# Patient Record
Sex: FEMALE | Race: BLACK OR AFRICAN AMERICAN | Employment: OTHER | URBAN - METROPOLITAN AREA
[De-identification: names, ages, dates, MRNs, and addresses within clinical notes are randomized per-mention and may not be internally consistent; named-entity substitution may affect disease eponyms.]

---

## 2019-11-08 ENCOUNTER — OP HISTORICAL/CONVERTED ENCOUNTER (OUTPATIENT)
Dept: OTHER | Age: 69
End: 2019-11-08

## 2020-02-19 ENCOUNTER — OP HISTORICAL/CONVERTED ENCOUNTER (OUTPATIENT)
Dept: OTHER | Age: 70
End: 2020-02-19

## 2020-09-12 DIAGNOSIS — Z12.39 SCREENING BREAST EXAMINATION: ICD-10-CM

## 2021-04-19 ENCOUNTER — HOSPITAL ENCOUNTER (OUTPATIENT)
Dept: LAB | Age: 71
Discharge: HOME | End: 2021-04-19
Payer: MEDICARE

## 2021-04-19 ENCOUNTER — TRANSCRIBE ORDER (OUTPATIENT)
Dept: REGISTRATION | Age: 71
End: 2021-04-19

## 2021-04-19 DIAGNOSIS — N18.6 TYPE 2 DIABETES MELLITUS WITH ESRD (END-STAGE RENAL DISEASE) (HCC): Primary | ICD-10-CM

## 2021-04-19 DIAGNOSIS — E11.22 TYPE 2 DIABETES MELLITUS WITH ESRD (END-STAGE RENAL DISEASE) (HCC): Primary | ICD-10-CM

## 2021-04-19 DIAGNOSIS — N18.6 TYPE 2 DIABETES MELLITUS WITH ESRD (END-STAGE RENAL DISEASE) (HCC): ICD-10-CM

## 2021-04-19 DIAGNOSIS — E11.22 TYPE 2 DIABETES MELLITUS WITH ESRD (END-STAGE RENAL DISEASE) (HCC): ICD-10-CM

## 2021-04-19 LAB
ANION GAP SERPL CALC-SCNC: 10 MMOL/L (ref 5–15)
BUN SERPL-MCNC: 82 MG/DL (ref 6–20)
BUN/CREAT SERPL: 24 (ref 12–20)
CA-I BLD-MCNC: 9.6 MG/DL (ref 8.5–10.1)
CHLORIDE SERPL-SCNC: 106 MMOL/L (ref 97–108)
CO2 SERPL-SCNC: 25 MMOL/L (ref 21–32)
CREAT SERPL-MCNC: 3.47 MG/DL (ref 0.55–1.02)
EST. AVERAGE GLUCOSE BLD GHB EST-MCNC: 114 MG/DL
GLUCOSE SERPL-MCNC: 87 MG/DL (ref 65–100)
HBA1C MFR BLD: 5.6 % (ref 4–5.6)
POTASSIUM SERPL-SCNC: 4.6 MMOL/L (ref 3.5–5.1)
SODIUM SERPL-SCNC: 141 MMOL/L (ref 136–145)

## 2021-04-19 PROCEDURE — 36415 COLL VENOUS BLD VENIPUNCTURE: CPT

## 2021-04-19 PROCEDURE — 83036 HEMOGLOBIN GLYCOSYLATED A1C: CPT

## 2021-04-19 PROCEDURE — 80048 BASIC METABOLIC PNL TOTAL CA: CPT

## 2021-08-06 ENCOUNTER — HOSPITAL ENCOUNTER (EMERGENCY)
Age: 71
Discharge: HOME | End: 2021-08-06
Attending: EMERGENCY MEDICINE
Payer: MEDICARE

## 2021-08-06 VITALS
HEIGHT: 63 IN | SYSTOLIC BLOOD PRESSURE: 115 MMHG | OXYGEN SATURATION: 99 % | RESPIRATION RATE: 20 BRPM | WEIGHT: 190 LBS | TEMPERATURE: 97.2 F | BODY MASS INDEX: 33.66 KG/M2 | HEART RATE: 75 BPM | DIASTOLIC BLOOD PRESSURE: 67 MMHG

## 2021-08-06 DIAGNOSIS — N30.01 ACUTE CYSTITIS WITH HEMATURIA: Primary | ICD-10-CM

## 2021-08-06 LAB
APPEARANCE UR: CLEAR
BACTERIA URNS QL MICRO: ABNORMAL /HPF
BILIRUB UR QL: NEGATIVE
COLOR UR: ABNORMAL
GLUCOSE UR STRIP.AUTO-MCNC: NEGATIVE MG/DL
HGB UR QL STRIP: ABNORMAL
KETONES UR QL STRIP.AUTO: NEGATIVE MG/DL
LEUKOCYTE ESTERASE UR QL STRIP.AUTO: ABNORMAL
NITRITE UR QL STRIP.AUTO: NEGATIVE
PH UR STRIP: 5.5 [PH] (ref 5–8)
PROT UR STRIP-MCNC: 30 MG/DL
RBC #/AREA URNS HPF: ABNORMAL /HPF (ref 0–3)
SP GR UR REFRACTOMETRY: 1.02 (ref 1–1.03)
UROBILINOGEN UR QL STRIP.AUTO: 0.2 EU/DL (ref 0.2–1)
WBC URNS QL MICRO: ABNORMAL /HPF (ref 0–5)

## 2021-08-06 PROCEDURE — 81001 URINALYSIS AUTO W/SCOPE: CPT

## 2021-08-06 PROCEDURE — 99283 EMERGENCY DEPT VISIT LOW MDM: CPT

## 2021-08-06 PROCEDURE — 74011250637 HC RX REV CODE- 250/637: Performed by: EMERGENCY MEDICINE

## 2021-08-06 RX ORDER — FUROSEMIDE 40 MG/1
40 TABLET ORAL DAILY
COMMUNITY

## 2021-08-06 RX ORDER — SPIRONOLACTONE 25 MG/1
25 TABLET ORAL DAILY
COMMUNITY

## 2021-08-06 RX ORDER — LISINOPRIL 10 MG/1
10 TABLET ORAL DAILY
COMMUNITY

## 2021-08-06 RX ORDER — CEPHALEXIN 500 MG/1
500 CAPSULE ORAL 2 TIMES DAILY
Qty: 10 CAPSULE | Refills: 0 | Status: SHIPPED | OUTPATIENT
Start: 2021-08-06 | End: 2021-08-11

## 2021-08-06 RX ORDER — SODIUM BICARBONATE 650 MG/1
650 TABLET ORAL 3 TIMES DAILY
COMMUNITY

## 2021-08-06 RX ORDER — PANTOPRAZOLE SODIUM 40 MG/1
40 TABLET, DELAYED RELEASE ORAL DAILY
COMMUNITY

## 2021-08-06 RX ORDER — AMIODARONE HYDROCHLORIDE 100 MG/1
50 TABLET ORAL DAILY
COMMUNITY

## 2021-08-06 RX ORDER — SODIUM POLYSTYRENE SULFONATE 15 G/60ML
SUSPENSION ORAL; RECTAL DAILY
COMMUNITY

## 2021-08-06 RX ORDER — CEPHALEXIN 250 MG/1
500 CAPSULE ORAL
Status: COMPLETED | OUTPATIENT
Start: 2021-08-06 | End: 2021-08-06

## 2021-08-06 RX ORDER — ALLOPURINOL 300 MG/1
300 TABLET ORAL DAILY
COMMUNITY

## 2021-08-06 RX ORDER — METOPROLOL SUCCINATE 50 MG/1
50 TABLET, EXTENDED RELEASE ORAL DAILY
COMMUNITY

## 2021-08-06 RX ORDER — ACETAMINOPHEN 325 MG/1
650 TABLET ORAL
Status: COMPLETED | OUTPATIENT
Start: 2021-08-06 | End: 2021-08-06

## 2021-08-06 RX ADMIN — CEPHALEXIN 500 MG: 250 CAPSULE ORAL at 11:44

## 2021-08-06 RX ADMIN — ACETAMINOPHEN 650 MG: 325 TABLET ORAL at 11:45

## 2021-08-06 NOTE — ED PROVIDER NOTES
EMERGENCY DEPARTMENT HISTORY AND PHYSICAL EXAM      Date: 8/6/2021  Patient Name: Rupal Prescott    History of Presenting Illness     Chief Complaint   Patient presents with    Back Pain     pt presents with complaint of back pain states that her whole back hurts, pt states that she fell 6-8 months ago and pain has been ongoing since. Pt states that she just doesnt feel good. States that she has been having loose stools but has been eating lots of sweets. Pt VS stable, Pt in NAD in triage. Pt rates pain 7/10       History Provided By: Patient    HPI: Rupal Prescott, 65 y.o. female with a past medical history significant diabetes, hypertension and CHF presents to the ED with cc of worsening lower back pain but denies any recent trauma    There are no other complaints, changes, or physical findings at this time. PCP: Saira Stewart MD    No current facility-administered medications on file prior to encounter. Current Outpatient Medications on File Prior to Encounter   Medication Sig Dispense Refill    sodium polystyrene (KAYEXALATE) 15 gram/60 mL suspension Take  by mouth daily.  pantoprazole (PROTONIX) 40 mg tablet Take 40 mg by mouth daily.  allopurinoL (ZYLOPRIM) 300 mg tablet Take 300 mg by mouth daily.  spironolactone (ALDACTONE) 25 mg tablet Take 25 mg by mouth daily.  metoprolol succinate (TOPROL-XL) 50 mg XL tablet Take 50 mg by mouth daily.  lisinopriL (PRINIVIL, ZESTRIL) 10 mg tablet Take 10 mg by mouth daily.  amiodarone (PACERONE) 100 mg tablet Take 50 mg by mouth daily.  furosemide (LASIX) 40 mg tablet Take 40 mg by mouth daily.  sodium bicarbonate 650 mg tablet Take 650 mg by mouth three (3) times daily.          Past History     Past Medical History:  Past Medical History:   Diagnosis Date    [facility]est[facility] [address]     Diabetes ([facility] [address]     Hypertension        Past Surgical History:  Past Surgical History:   Procedure Laterality Date    HX IMPLANTABLE CARDIOVERTER DEFIBRILLATOR         Family History:  History reviewed. No pertinent family history. Social History:  Social History     Tobacco Use    Smoking status: Never Smoker    Smokeless tobacco: Never Used   Substance Use Topics    Alcohol use: Not on file    Drug use: Not on file       Allergies: Allergies   Allergen Reactions    Contrast Agent [Iodine] Nausea and Vomiting         Review of Systems     Review of Systems   Constitutional: Negative for chills and fever. HENT: Negative for rhinorrhea and sore throat. Eyes: Negative for pain and visual disturbance. Respiratory: Negative for cough and shortness of breath. Cardiovascular: Negative for chest pain and leg swelling. Gastrointestinal: Negative for abdominal pain and vomiting. Endocrine: Negative for polydipsia and polyuria. Genitourinary: Negative for dysuria and urgency. Musculoskeletal: Positive for back pain. Negative for neck pain. Skin: Negative for color change and pallor. Neurological: Negative for weakness and numbness. Psychiatric/Behavioral: Negative. Physical Exam     Physical Exam  Vitals and nursing note reviewed. Constitutional:       Appearance: Normal appearance. HENT:      Head: Normocephalic and atraumatic. Mouth/Throat:      Mouth: Mucous membranes are moist.      Pharynx: Oropharynx is clear. Eyes:      Extraocular Movements: Extraocular movements intact. Conjunctiva/sclera: Conjunctivae normal.      Pupils: Pupils are equal, round, and reactive to light. Cardiovascular:      Rate and Rhythm: Normal rate and regular rhythm. Pulses: Normal pulses. Heart sounds: Normal heart sounds. Pulmonary:      Effort: Pulmonary effort is normal.      Breath sounds: Normal breath sounds. Abdominal:      General: Bowel sounds are normal.      Palpations: Abdomen is soft. Tenderness: There is no right CVA tenderness or left CVA tenderness.    Musculoskeletal: General: Tenderness present. Normal range of motion. Cervical back: Normal, normal range of motion and neck supple. Thoracic back: Normal.      Lumbar back: Tenderness present. No deformity, spasms or bony tenderness. Comments: Bilateral paralumbar soft tissue tenderness   Skin:     General: Skin is warm and dry. Capillary Refill: Capillary refill takes less than 2 seconds. Neurological:      General: No focal deficit present. Mental Status: She is alert and oriented to person, place, and time. Psychiatric:         Mood and Affect: Mood normal.         Behavior: Behavior normal.         Lab and Diagnostic Study Results     Labs -   No results found for this or any previous visit (from the past 12 hour(s)). Radiologic Studies -   @lastxrresult@  CT Results  (Last 48 hours)    None        CXR Results  (Last 48 hours)    None            Medical Decision Making   - I am the first provider for this patient. - I reviewed the vital signs, available nursing notes, past medical history, past surgical history, family history and social history. - Initial assessment performed. The patients presenting problems have been discussed, and they are in agreement with the care plan formulated and outlined with them. I have encouraged them to ask questions as they arise throughout their visit. Vital Signs-Reviewed the patient's vital signs. Patient Vitals for the past 12 hrs:   Temp Pulse Resp BP SpO2   08/06/21 0955 -- -- -- -- 99 %   08/06/21 0937 97.2 °F (36.2 °C) 75 20 115/67 99 %       Records Reviewed: Nursing Notes    The patient presents with back pain with a differential diagnosis of  kidney stone, lumbar strain and UTI      ED Course:          Provider Notes (Medical Decision Making): MDM       Procedures   Medical Decision Makingedical Decision Making  Performed by:  David Thomas MD  PROCEDURES:  Procedures       Disposition   Disposition: Condition stable and improved  DC- Adult Discharges: All of the diagnostic tests were reviewed and questions answered. Diagnosis, care plan and treatment options were discussed. The patient understands the instructions and will follow up as directed. The patients results have been reviewed with them. They have been counseled regarding their diagnosis. The patient verbally convey understanding and agreement of the signs, symptoms, diagnosis, treatment and prognosis and additionally agrees to follow up as recommended with their PCP in 24 - 48 hours. They also agree with the care-plan and convey that all of their questions have been answered. I have also put together some discharge instructions for them that include: 1) educational information regarding their diagnosis, 2) how to care for their diagnosis at home, as well a 3) list of reasons why they would want to return to the ED prior to their follow-up appointment, should their condition change. DISCHARGE PLAN:  1. Current Discharge Medication List      CONTINUE these medications which have NOT CHANGED    Details   sodium polystyrene (KAYEXALATE) 15 gram/60 mL suspension Take  by mouth daily. pantoprazole (PROTONIX) 40 mg tablet Take 40 mg by mouth daily. allopurinoL (ZYLOPRIM) 300 mg tablet Take 300 mg by mouth daily. spironolactone (ALDACTONE) 25 mg tablet Take 25 mg by mouth daily. metoprolol succinate (TOPROL-XL) 50 mg XL tablet Take 50 mg by mouth daily. lisinopriL (PRINIVIL, ZESTRIL) 10 mg tablet Take 10 mg by mouth daily. amiodarone (PACERONE) 100 mg tablet Take 50 mg by mouth daily. furosemide (LASIX) 40 mg tablet Take 40 mg by mouth daily. sodium bicarbonate 650 mg tablet Take 650 mg by mouth three (3) times daily. 2.   Follow-up Information    None       3. Return to ED if worse   4. Current Discharge Medication List            Diagnosis     Clinical Impression: No diagnosis found.     Attestations:    Julia MD Satya    Please note that this dictation was completed with [employer], the computer voice recognition software. Quite often unanticipated grammatical, syntax, homophones, and other interpretive errors are inadvertently transcribed by the computer software. Please disregard these errors. Please excuse any errors that have escaped final proofreading. Thank you.

## 2021-08-06 NOTE — ED NOTES
Pt up and ambulatory to restroom to provide urine sample, pt in NAD using cane to ambulate.  Pt states that she recently saw kidney MD on the 3rd of this month of august. Pt states that even with use of diuretics that she has not been voiding like she normally does which was the purpose of her recent MD visit to her kidney doctor

## 2021-08-06 NOTE — ED TRIAGE NOTES
.  Chief Complaint   Patient presents with    Back Pain     pt presents with complaint of back pain states that her whole back hurts, pt states that she fell 6-8 months ago and pain has been ongoing since. Pt states that she just doesnt feel good. States that she has been having loose stools but has been eating lots of sweets. Pt VS stable, Pt in NAD in triage.  Pt rates pain 7/10

## 2021-08-17 ENCOUNTER — TRANSCRIBE ORDER (OUTPATIENT)
Dept: SCHEDULING | Age: 71
End: 2021-08-17

## 2021-08-17 DIAGNOSIS — Z12.31 SCREENING MAMMOGRAM FOR HIGH-RISK PATIENT: Primary | ICD-10-CM

## 2021-09-16 ENCOUNTER — HOSPITAL ENCOUNTER (OUTPATIENT)
Dept: MAMMOGRAPHY | Age: 71
Discharge: HOME | End: 2021-09-16
Attending: FAMILY MEDICINE
Payer: MEDICARE

## 2021-09-16 DIAGNOSIS — Z12.31 SCREENING MAMMOGRAM FOR HIGH-RISK PATIENT: ICD-10-CM

## 2021-09-16 PROCEDURE — 77067 SCR MAMMO BI INCL CAD: CPT

## 2022-10-20 ENCOUNTER — TRANSCRIBE ORDER (OUTPATIENT)
Dept: SCHEDULING | Age: 72
End: 2022-10-20

## 2022-10-20 DIAGNOSIS — Z12.31 VISIT FOR SCREENING MAMMOGRAM: Primary | ICD-10-CM

## 2022-11-18 ENCOUNTER — HOSPITAL ENCOUNTER (EMERGENCY)
Age: 72
Discharge: HOME | End: 2022-11-18
Attending: EMERGENCY MEDICINE
Payer: MEDICARE

## 2022-11-18 VITALS
DIASTOLIC BLOOD PRESSURE: 126 MMHG | HEIGHT: 63 IN | OXYGEN SATURATION: 98 % | RESPIRATION RATE: 18 BRPM | TEMPERATURE: 98.6 F | BODY MASS INDEX: 31.89 KG/M2 | WEIGHT: 180 LBS | SYSTOLIC BLOOD PRESSURE: 167 MMHG | HEART RATE: 90 BPM

## 2022-11-18 DIAGNOSIS — S90.822A BLISTER (NONTHERMAL), LEFT FOOT, INITIAL ENCOUNTER: Primary | ICD-10-CM

## 2022-11-18 PROCEDURE — 99283 EMERGENCY DEPT VISIT LOW MDM: CPT

## 2022-11-18 RX ORDER — CEPHALEXIN 500 MG/1
500 CAPSULE ORAL 3 TIMES DAILY
Qty: 21 CAPSULE | Refills: 0 | Status: SHIPPED | OUTPATIENT
Start: 2022-11-18 | End: 2022-11-25

## 2022-11-18 NOTE — ED NOTES
Prior to dc made MD aware bp was elevated but pt had not taken her BP med. MD states to dc pt home and have her take BP med when she gets home.  Pt and Daughter made aware she needed to take her BP meds when she gets home

## 2022-11-18 NOTE — ED PROVIDER NOTES
EMERGENCY DEPARTMENT HISTORY AND PHYSICAL EXAM      Date: 11/18/2022  Patient Name: Cami Mclaughlin    History of Presenting Illness     Chief Complaint   Patient presents with    Foot Pain       History Provided By: Patient    HPI: Cami Mclaughlin, 66 y.o. female history of diabetes presenting with seeping wound from the right foot. Daughter noticed some fluid from the right foot yesterday. She also had a scab on the lower leg that has fallen off. No fevers, pain, redness. Patient has been wearing slippers for several months due to leg swelling. She does have a podiatrist    There are no other complaints, changes, or physical findings at this time. PCP: Khris Arroyo MD    No current facility-administered medications on file prior to encounter. Current Outpatient Medications on File Prior to Encounter   Medication Sig Dispense Refill    sodium polystyrene (KAYEXALATE) 15 gram/60 mL suspension Take  by mouth daily. pantoprazole (PROTONIX) 40 mg tablet Take 40 mg by mouth daily. allopurinoL (ZYLOPRIM) 300 mg tablet Take 300 mg by mouth daily. spironolactone (ALDACTONE) 25 mg tablet Take 25 mg by mouth daily. metoprolol succinate (TOPROL-XL) 50 mg XL tablet Take 50 mg by mouth daily. lisinopriL (PRINIVIL, ZESTRIL) 10 mg tablet Take 10 mg by mouth daily. amiodarone (PACERONE) 100 mg tablet Take 50 mg by mouth daily. furosemide (LASIX) 40 mg tablet Take 40 mg by mouth daily. sodium bicarbonate 650 mg tablet Take 650 mg by mouth three (3) times daily. Past History     Past Medical History:  Past Medical History:   Diagnosis Date    CHF (congestive heart failure) ([facility] [address]     Diabetes ([facility] [address]     Hypertension     Menopause        Past Surgical History:  Past Surgical History:   Procedure Laterality Date    HX IMPLANTABLE CARDIOVERTER DEFIBRILLATOR         Family History:  History reviewed. No pertinent family history.     Social History:  Social History     Tobacco Use Smoking status: Never    Smokeless tobacco: Never       Allergies: Allergies   Allergen Reactions    Contrast Agent [Iodine] Nausea and Vomiting       Review of Systems   Review of Systems   Constitutional:  Negative for activity change, chills and fever. Respiratory: Negative. Cardiovascular: Negative. Gastrointestinal: Negative. Skin:  Positive for wound. Neurological:  Negative for weakness and numbness. Physical Exam   Physical Exam  Vitals and nursing note reviewed. Constitutional:       General: She is not in acute distress. Appearance: Normal appearance. HENT:      Head: Normocephalic and atraumatic. Mouth/Throat:      Mouth: Mucous membranes are moist.   Eyes:      Conjunctiva/sclera: Conjunctivae normal.   Pulmonary:      Effort: Pulmonary effort is normal. No respiratory distress. Musculoskeletal:      Cervical back: Normal range of motion. Comments: Large blister to the right lower calf. No active drainage. No surrounding erythema or warmth. Skin:     General: Skin is warm and dry. Neurological:      General: No focal deficit present. Mental Status: She is alert and oriented to person, place, and time. Mental status is at baseline. Lab and Diagnostic Study Results   Labs -   No results found for this or any previous visit (from the past 12 hour(s)). Radiologic Studies -   @lastxrresult@  CT Results  (Last 48 hours)      None          CXR Results  (Last 48 hours)      None            Medical Decision Making and ED Course   Differential Diagnosis & Medical Decision Making Provider Note:   Patient with history of diabetes presenting with wound to her right lower leg. She has a large blister to her right lower leg that appears ruptured but otherwise still intact. No surrounding erythema, warmth.   Given high risk with her history of diabetes we will put on Keflex and have her follow-up with her podiatrist.    - I am the first provider for this patient. I reviewed the vital signs, available nursing notes, past medical history, past surgical history, family history and social history. The patients presenting problems have been discussed, and they are in agreement with the care plan formulated and outlined with them. I have encouraged them to ask questions as they arise throughout their visit. Vital Signs-Reviewed the patient's vital signs. Patient Vitals for the past 12 hrs:   Temp Pulse Resp BP SpO2   11/18/22 1124 98.6 °F (37 °C) (!) 113 19 (!) 171/120 98 %       ED Course:             Disposition   Disposition: DC- Adult Discharges: All of the diagnostic tests were reviewed and questions answered. Diagnosis, care plan and treatment options were discussed. The patient understands the instructions and will follow up as directed. The patients results have been reviewed with them. They have been counseled regarding their diagnosis. The patient verbally convey understanding and agreement of the signs, symptoms, diagnosis, treatment and prognosis and additionally agrees to follow up as recommended with their PCP in 24 - 48 hours. They also agree with the care-plan and convey that all of their questions have been answered. I have also put together some discharge instructions for them that include: 1) educational information regarding their diagnosis, 2) how to care for their diagnosis at home, as well a 3) list of reasons why they would want to return to the ED prior to their follow-up appointment, should their condition change. DISCHARGE PLAN:  1. Current Discharge Medication List        START taking these medications    Details   cephALEXin (Keflex) 500 mg capsule Take 1 Capsule by mouth three (3) times daily for 7 days. Qty: 21 Capsule, Refills: 0           CONTINUE these medications which have NOT CHANGED    Details   sodium polystyrene (KAYEXALATE) 15 gram/60 mL suspension Take  by mouth daily.       pantoprazole (PROTONIX) 40 mg tablet Take 40 mg by mouth daily. allopurinoL (ZYLOPRIM) 300 mg tablet Take 300 mg by mouth daily. spironolactone (ALDACTONE) 25 mg tablet Take 25 mg by mouth daily. metoprolol succinate (TOPROL-XL) 50 mg XL tablet Take 50 mg by mouth daily. lisinopriL (PRINIVIL, ZESTRIL) 10 mg tablet Take 10 mg by mouth daily. amiodarone (PACERONE) 100 mg tablet Take 50 mg by mouth daily. furosemide (LASIX) 40 mg tablet Take 40 mg by mouth daily. sodium bicarbonate 650 mg tablet Take 650 mg by mouth three (3) times daily. 2.   Follow-up Information       Follow up With Specialties Details Why Contact Molly Huffman DPM Podiatry In 2 days for reevaluation [address] [id number] [phone]            3. Return to ED if worse   4. Current Discharge Medication List        START taking these medications    Details   cephALEXin (Keflex) 500 mg capsule Take 1 Capsule by mouth three (3) times daily for 7 days. Qty: 21 Capsule, Refills: 0  Start date: 11/18/2022, End date: 11/25/2022                Diagnosis/Clinical Impression     Clinical Impression:   1. Blister (nonthermal), left foot, initial encounter        Attestations: Gillian COOK MD, am the primary clinician of record. Please note that this dictation was completed with conXt, the computer voice recognition software. Quite often unanticipated grammatical, syntax, homophones, and other interpretive errors are inadvertently transcribed by the computer software. Please disregard these errors. Please excuse any errors that have escaped final proofreading. Thank you.

## 2022-11-18 NOTE — ED TRIAGE NOTES
Pt presents c/o open sores on right foot and scratches on both legs. Pt. Stated her foot doctor requested her come to the ED.

## 2022-12-17 ENCOUNTER — APPOINTMENT (OUTPATIENT)
Dept: GENERAL RADIOLOGY | Age: 72
End: 2022-12-17
Attending: EMERGENCY MEDICINE
Payer: MEDICARE

## 2022-12-17 ENCOUNTER — HOSPITAL ENCOUNTER (EMERGENCY)
Age: 72
Discharge: ANOTHER HOSPITAL | End: 2022-12-17
Attending: EMERGENCY MEDICINE
Payer: MEDICARE

## 2022-12-17 ENCOUNTER — HOSPITAL ENCOUNTER (INPATIENT)
Age: 72
LOS: 5 days | Discharge: NURSING, REHAB OR LONG-TERM CARE | End: 2022-12-23
Attending: STUDENT IN AN ORGANIZED HEALTH CARE EDUCATION/TRAINING PROGRAM | Admitting: INTERNAL MEDICINE
Payer: MEDICARE

## 2022-12-17 VITALS
TEMPERATURE: 99 F | HEIGHT: 63 IN | OXYGEN SATURATION: 97 % | WEIGHT: 231.8 LBS | DIASTOLIC BLOOD PRESSURE: 85 MMHG | RESPIRATION RATE: 22 BRPM | SYSTOLIC BLOOD PRESSURE: 139 MMHG | HEART RATE: 90 BPM | BODY MASS INDEX: 41.07 KG/M2

## 2022-12-17 DIAGNOSIS — R09.02 HYPOXIA: ICD-10-CM

## 2022-12-17 DIAGNOSIS — J81.0 ACUTE PULMONARY EDEMA (HCC): ICD-10-CM

## 2022-12-17 DIAGNOSIS — I50.9 ACUTE ON CHRONIC CONGESTIVE HEART FAILURE, UNSPECIFIED HEART FAILURE TYPE (HCC): Primary | ICD-10-CM

## 2022-12-17 DIAGNOSIS — I50.43 ACUTE ON CHRONIC COMBINED SYSTOLIC AND DIASTOLIC CONGESTIVE HEART FAILURE (HCC): ICD-10-CM

## 2022-12-17 DIAGNOSIS — I21.4 NSTEMI (NON-ST ELEVATED MYOCARDIAL INFARCTION) (HCC): Primary | ICD-10-CM

## 2022-12-17 DIAGNOSIS — N63.20 MASS OF LEFT BREAST, UNSPECIFIED QUADRANT: ICD-10-CM

## 2022-12-17 DIAGNOSIS — I21.4 NSTEMI (NON-ST ELEVATED MYOCARDIAL INFARCTION) (HCC): ICD-10-CM

## 2022-12-17 LAB
ALBUMIN SERPL-MCNC: 3.2 G/DL (ref 3.5–5)
ALBUMIN/GLOB SERPL: 0.8 {RATIO} (ref 1.1–2.2)
ALP SERPL-CCNC: 136 U/L (ref 45–117)
ALT SERPL-CCNC: 22 U/L (ref 12–78)
ANION GAP SERPL CALC-SCNC: 9 MMOL/L (ref 5–15)
AST SERPL W P-5'-P-CCNC: 26 U/L (ref 15–37)
ATRIAL RATE: 108 BPM
BILIRUB SERPL-MCNC: 1 MG/DL (ref 0.2–1)
BNP SERPL-MCNC: ABNORMAL PG/ML
BUN SERPL-MCNC: 31 MG/DL (ref 6–20)
BUN/CREAT SERPL: 14 (ref 12–20)
CA-I BLD-MCNC: 8.3 MG/DL (ref 8.5–10.1)
CALCULATED P AXIS, ECG09: -63 DEGREES
CALCULATED R AXIS, ECG10: -123 DEGREES
CALCULATED T AXIS, ECG11: 67 DEGREES
CHLORIDE SERPL-SCNC: 106 MMOL/L (ref 97–108)
CO2 SERPL-SCNC: 29 MMOL/L (ref 21–32)
CREAT SERPL-MCNC: 2.22 MG/DL (ref 0.55–1.02)
D DIMER PPP FEU-MCNC: 3.26 UG/ML(FEU)
DIAGNOSIS, 93000: NORMAL
FLUAV RNA SPEC QL NAA+PROBE: NOT DETECTED
FLUBV RNA SPEC QL NAA+PROBE: NOT DETECTED
GLOBULIN SER CALC-MCNC: 4.1 G/DL (ref 2–4)
GLUCOSE SERPL-MCNC: 154 MG/DL (ref 65–100)
INR PPP: 1.2 (ref 0.9–1.1)
LACTATE SERPL-SCNC: 2 MMOL/L (ref 0.4–2)
P-R INTERVAL, ECG05: 100 MS
POTASSIUM SERPL-SCNC: 4.1 MMOL/L (ref 3.5–5.1)
PROT SERPL-MCNC: 7.3 G/DL (ref 6.4–8.2)
PROTHROMBIN TIME: 14.5 SEC (ref 11.9–14.6)
Q-T INTERVAL, ECG07: 425 MS
QRS DURATION, ECG06: 163 MS
QTC CALCULATION (BEZET), ECG08: 573 MS
SARS-COV-2, COV2: NOT DETECTED
SODIUM SERPL-SCNC: 144 MMOL/L (ref 136–145)
TROPONIN-HIGH SENSITIVITY: 131 NG/L (ref 0–51)
VENTRICULAR RATE, ECG03: 109 BPM

## 2022-12-17 PROCEDURE — 99285 EMERGENCY DEPT VISIT HI MDM: CPT

## 2022-12-17 PROCEDURE — 71045 X-RAY EXAM CHEST 1 VIEW: CPT

## 2022-12-17 PROCEDURE — 74011250636 HC RX REV CODE- 250/636: Performed by: EMERGENCY MEDICINE

## 2022-12-17 PROCEDURE — 83735 ASSAY OF MAGNESIUM: CPT

## 2022-12-17 PROCEDURE — 87636 SARSCOV2 & INF A&B AMP PRB: CPT

## 2022-12-17 PROCEDURE — 74011250637 HC RX REV CODE- 250/637: Performed by: EMERGENCY MEDICINE

## 2022-12-17 PROCEDURE — 96374 THER/PROPH/DIAG INJ IV PUSH: CPT

## 2022-12-17 PROCEDURE — 85610 PROTHROMBIN TIME: CPT

## 2022-12-17 PROCEDURE — 80053 COMPREHEN METABOLIC PANEL: CPT

## 2022-12-17 PROCEDURE — 83880 ASSAY OF NATRIURETIC PEPTIDE: CPT

## 2022-12-17 PROCEDURE — 93005 ELECTROCARDIOGRAM TRACING: CPT

## 2022-12-17 PROCEDURE — 84484 ASSAY OF TROPONIN QUANT: CPT

## 2022-12-17 PROCEDURE — 83605 ASSAY OF LACTIC ACID: CPT

## 2022-12-17 PROCEDURE — 36415 COLL VENOUS BLD VENIPUNCTURE: CPT

## 2022-12-17 PROCEDURE — 87040 BLOOD CULTURE FOR BACTERIA: CPT

## 2022-12-17 PROCEDURE — 85025 COMPLETE CBC W/AUTO DIFF WBC: CPT

## 2022-12-17 PROCEDURE — 85379 FIBRIN DEGRADATION QUANT: CPT

## 2022-12-17 PROCEDURE — 85730 THROMBOPLASTIN TIME PARTIAL: CPT

## 2022-12-17 RX ORDER — INSULIN LISPRO 100 [IU]/ML
INJECTION, SOLUTION INTRAVENOUS; SUBCUTANEOUS
COMMUNITY

## 2022-12-17 RX ORDER — FUROSEMIDE 10 MG/ML
40 INJECTION INTRAMUSCULAR; INTRAVENOUS
Status: COMPLETED | OUTPATIENT
Start: 2022-12-17 | End: 2022-12-17

## 2022-12-17 RX ORDER — POTASSIUM CHLORIDE 20 MEQ/1
TABLET, EXTENDED RELEASE ORAL
COMMUNITY
End: 2022-12-23

## 2022-12-17 RX ORDER — PEN NEEDLE, DIABETIC 31 GX3/16"
NEEDLE, DISPOSABLE MISCELLANEOUS
COMMUNITY

## 2022-12-17 RX ORDER — INSULIN DEGLUDEC 200 U/ML
INJECTION, SOLUTION SUBCUTANEOUS
COMMUNITY

## 2022-12-17 RX ORDER — SACUBITRIL AND VALSARTAN 24; 26 MG/1; MG/1
TABLET, FILM COATED ORAL
COMMUNITY
End: 2022-12-23

## 2022-12-17 RX ORDER — SIMVASTATIN 20 MG/1
TABLET, FILM COATED ORAL
COMMUNITY

## 2022-12-17 RX ORDER — INSULIN GLARGINE 100 [IU]/ML
INJECTION, SOLUTION SUBCUTANEOUS
COMMUNITY
End: 2022-12-18

## 2022-12-17 RX ORDER — INSULIN DEGLUDEC 100 U/ML
50 INJECTION, SOLUTION SUBCUTANEOUS AT BEDTIME
COMMUNITY
End: 2022-12-18

## 2022-12-17 RX ORDER — GLIPIZIDE 10 MG/1
TABLET ORAL
COMMUNITY
End: 2022-12-18

## 2022-12-17 RX ORDER — DULAGLUTIDE 1.5 MG/.5ML
INJECTION, SOLUTION SUBCUTANEOUS
COMMUNITY

## 2022-12-17 RX ORDER — HYDRALAZINE HYDROCHLORIDE 10 MG/1
1 TABLET, FILM COATED ORAL 2 TIMES DAILY
COMMUNITY
Start: 2022-08-22 | End: 2023-08-22

## 2022-12-17 RX ORDER — CAPTOPRIL 25 MG/1
TABLET ORAL
COMMUNITY

## 2022-12-17 RX ORDER — GUAIFENESIN 100 MG/5ML
162 LIQUID (ML) ORAL
Status: COMPLETED | OUTPATIENT
Start: 2022-12-17 | End: 2022-12-17

## 2022-12-17 RX ORDER — TRAZODONE HYDROCHLORIDE 50 MG/1
50 TABLET ORAL AT BEDTIME
COMMUNITY
End: 2022-12-23

## 2022-12-17 RX ORDER — OMEPRAZOLE 40 MG/1
CAPSULE, DELAYED RELEASE ORAL
COMMUNITY
End: 2022-12-18

## 2022-12-17 RX ORDER — BUMETANIDE 0.25 MG/ML
2 INJECTION INTRAMUSCULAR; INTRAVENOUS
Status: COMPLETED | OUTPATIENT
Start: 2022-12-17 | End: 2022-12-18

## 2022-12-17 RX ORDER — LEVOTHYROXINE SODIUM 50 UG/1
TABLET ORAL
COMMUNITY

## 2022-12-17 RX ORDER — ASPIRIN 81 MG/1
TABLET ORAL
COMMUNITY
End: 2022-12-23

## 2022-12-17 RX ADMIN — ASPIRIN 81 MG 162 MG: 81 TABLET ORAL at 21:33

## 2022-12-17 RX ADMIN — FUROSEMIDE 40 MG: 10 INJECTION, SOLUTION INTRAMUSCULAR; INTRAVENOUS at 21:32

## 2022-12-18 ENCOUNTER — APPOINTMENT (OUTPATIENT)
Dept: NON INVASIVE DIAGNOSTICS | Age: 72
End: 2022-12-18
Attending: INTERNAL MEDICINE
Payer: MEDICARE

## 2022-12-18 PROBLEM — I50.9 CHF EXACERBATION (HCC): Status: ACTIVE | Noted: 2022-12-18

## 2022-12-18 LAB
ALBUMIN SERPL-MCNC: 3.2 G/DL (ref 3.5–5)
ALBUMIN/GLOB SERPL: 0.9 {RATIO} (ref 1.1–2.2)
ALP SERPL-CCNC: 133 U/L (ref 45–117)
ALT SERPL-CCNC: 18 U/L (ref 12–78)
ANION GAP SERPL CALC-SCNC: 9 MMOL/L (ref 5–15)
APTT PPP: 29.8 SEC (ref 21.2–34.1)
AST SERPL W P-5'-P-CCNC: 23 U/L (ref 15–37)
ATRIAL RATE: 97 BPM
BACTERIA SPEC CULT: NORMAL
BACTERIA SPEC CULT: NORMAL
BASOPHILS # BLD: 0.1 K/UL (ref 0–0.1)
BASOPHILS NFR BLD: 1 % (ref 0–1)
BILIRUB SERPL-MCNC: 1 MG/DL (ref 0.2–1)
BNP SERPL-MCNC: ABNORMAL PG/ML
BUN SERPL-MCNC: 27 MG/DL (ref 6–20)
BUN/CREAT SERPL: 14 (ref 12–20)
CA-I BLD-MCNC: 8.4 MG/DL (ref 8.5–10.1)
CALCULATED R AXIS, ECG10: -173 DEGREES
CALCULATED T AXIS, ECG11: 5 DEGREES
CHLORIDE SERPL-SCNC: 109 MMOL/L (ref 97–108)
CO2 SERPL-SCNC: 26 MMOL/L (ref 21–32)
CREAT SERPL-MCNC: 1.99 MG/DL (ref 0.55–1.02)
D DIMER PPP FEU-MCNC: 2.84 UG/ML(FEU)
DIAGNOSIS, 93000: NORMAL
DIFFERENTIAL METHOD BLD: ABNORMAL
ECHO AO ROOT DIAM: 3 CM
ECHO AO ROOT INDEX: 1.46 CM/M2
ECHO EST RA PRESSURE: 15 MMHG
ECHO IVC PROX: 2.8 CM
ECHO LA DIAMETER INDEX: 1.89 CM/M2
ECHO LA DIAMETER: 3.9 CM
ECHO LA TO AORTIC ROOT RATIO: 1.3
ECHO LV EDV A4C: 107 ML
ECHO LV EDV INDEX A4C: 52 ML/M2
ECHO LV EJECTION FRACTION BIPLANE: 25 % (ref 55–100)
ECHO LV FRACTIONAL SHORTENING: 13 % (ref 28–44)
ECHO LV INTERNAL DIMENSION DIASTOLE INDEX: 2.67 CM/M2
ECHO LV INTERNAL DIMENSION DIASTOLIC: 5.5 CM (ref 3.9–5.3)
ECHO LV INTERNAL DIMENSION SYSTOLIC INDEX: 2.33 CM/M2
ECHO LV INTERNAL DIMENSION SYSTOLIC: 4.8 CM
ECHO LV IVSD: 1.3 CM (ref 0.6–0.9)
ECHO LV MASS 2D: 288.2 G (ref 67–162)
ECHO LV MASS INDEX 2D: 139.9 G/M2 (ref 43–95)
ECHO LV POSTERIOR WALL DIASTOLIC: 1.2 CM (ref 0.6–0.9)
ECHO LV RELATIVE WALL THICKNESS RATIO: 0.44
ECHO RIGHT VENTRICULAR SYSTOLIC PRESSURE (RVSP): 59 MMHG
ECHO TV REGURGITANT MAX VELOCITY: 3.32 M/S
ECHO TV REGURGITANT PEAK GRADIENT: 44 MMHG
EOSINOPHIL # BLD: 0 K/UL (ref 0–0.4)
EOSINOPHIL NFR BLD: 0 % (ref 0–7)
ERYTHROCYTE [DISTWIDTH] IN BLOOD BY AUTOMATED COUNT: 16.4 % (ref 11.5–14.5)
GLOBULIN SER CALC-MCNC: 3.4 G/DL (ref 2–4)
GLUCOSE BLD STRIP.AUTO-MCNC: 107 MG/DL (ref 65–100)
GLUCOSE BLD STRIP.AUTO-MCNC: 109 MG/DL (ref 65–100)
GLUCOSE BLD STRIP.AUTO-MCNC: 114 MG/DL (ref 65–100)
GLUCOSE SERPL-MCNC: 127 MG/DL (ref 65–100)
HCT VFR BLD AUTO: 36.6 % (ref 35–47)
HGB BLD-MCNC: 11.5 G/DL (ref 11.5–16)
IMM GRANULOCYTES # BLD AUTO: 0 K/UL (ref 0–0.04)
IMM GRANULOCYTES NFR BLD AUTO: 0 % (ref 0–0.5)
INR PPP: 1.2 (ref 0.9–1.1)
LYMPHOCYTES # BLD: 1.4 K/UL (ref 0.8–3.5)
LYMPHOCYTES NFR BLD: 25 % (ref 12–49)
MAGNESIUM SERPL-MCNC: 2.1 MG/DL (ref 1.6–2.4)
MCH RBC QN AUTO: 28 PG (ref 26–34)
MCHC RBC AUTO-ENTMCNC: 31.4 G/DL (ref 30–36.5)
MCV RBC AUTO: 89.1 FL (ref 80–99)
MONOCYTES # BLD: 0.8 K/UL (ref 0–1)
MONOCYTES NFR BLD: 15 % (ref 5–13)
NEUTS SEG # BLD: 3.3 K/UL (ref 1.8–8)
NEUTS SEG NFR BLD: 59 % (ref 32–75)
NRBC # BLD: 0.04 K/UL (ref 0–0.01)
NRBC BLD-RTO: 0.7 PER 100 WBC
PERFORMED BY, TECHID: ABNORMAL
PLATELET # BLD AUTO: 235 K/UL (ref 150–400)
PMV BLD AUTO: 9.3 FL (ref 8.9–12.9)
POTASSIUM SERPL-SCNC: 3.4 MMOL/L (ref 3.5–5.1)
PROT SERPL-MCNC: 6.6 G/DL (ref 6.4–8.2)
PROTHROMBIN TIME: 15.5 SEC (ref 11.9–14.6)
Q-T INTERVAL, ECG07: 480 MS
QRS DURATION, ECG06: 162 MS
QTC CALCULATION (BEZET), ECG08: 609 MS
RBC # BLD AUTO: 4.11 M/UL (ref 3.8–5.2)
RBC MORPH BLD: ABNORMAL
SODIUM SERPL-SCNC: 144 MMOL/L (ref 136–145)
SPECIAL REQUESTS,SREQ: NORMAL
SPECIAL REQUESTS,SREQ: NORMAL
THERAPEUTIC RANGE,PTTT: NORMAL SEC (ref 82–109)
TROPONIN-HIGH SENSITIVITY: 127 NG/L (ref 0–51)
TROPONIN-HIGH SENSITIVITY: 140 NG/L (ref 0–51)
TSH SERPL DL<=0.05 MIU/L-ACNC: 14.2 UIU/ML (ref 0.36–3.74)
VENTRICULAR RATE, ECG03: 97 BPM
WBC # BLD AUTO: 5.6 K/UL (ref 3.6–11)

## 2022-12-18 PROCEDURE — 82962 GLUCOSE BLOOD TEST: CPT

## 2022-12-18 PROCEDURE — 74011250637 HC RX REV CODE- 250/637: Performed by: NURSE PRACTITIONER

## 2022-12-18 PROCEDURE — 74011250636 HC RX REV CODE- 250/636: Performed by: INTERNAL MEDICINE

## 2022-12-18 PROCEDURE — 74011250637 HC RX REV CODE- 250/637: Performed by: INTERNAL MEDICINE

## 2022-12-18 PROCEDURE — 65270000029 HC RM PRIVATE

## 2022-12-18 PROCEDURE — 83880 ASSAY OF NATRIURETIC PEPTIDE: CPT

## 2022-12-18 PROCEDURE — 93308 TTE F-UP OR LMTD: CPT

## 2022-12-18 PROCEDURE — 84443 ASSAY THYROID STIM HORMONE: CPT

## 2022-12-18 PROCEDURE — 83036 HEMOGLOBIN GLYCOSYLATED A1C: CPT

## 2022-12-18 PROCEDURE — 85379 FIBRIN DEGRADATION QUANT: CPT

## 2022-12-18 PROCEDURE — 93005 ELECTROCARDIOGRAM TRACING: CPT

## 2022-12-18 PROCEDURE — 74011000250 HC RX REV CODE- 250: Performed by: STUDENT IN AN ORGANIZED HEALTH CARE EDUCATION/TRAINING PROGRAM

## 2022-12-18 PROCEDURE — 74011000250 HC RX REV CODE- 250: Performed by: INTERNAL MEDICINE

## 2022-12-18 RX ORDER — PANTOPRAZOLE SODIUM 40 MG/1
40 TABLET, DELAYED RELEASE ORAL DAILY
Status: DISCONTINUED | OUTPATIENT
Start: 2022-12-18 | End: 2022-12-23 | Stop reason: HOSPADM

## 2022-12-18 RX ORDER — POLYETHYLENE GLYCOL 3350 17 G/17G
17 POWDER, FOR SOLUTION ORAL DAILY PRN
Status: DISCONTINUED | OUTPATIENT
Start: 2022-12-18 | End: 2022-12-23 | Stop reason: HOSPADM

## 2022-12-18 RX ORDER — ONDANSETRON 2 MG/ML
4 INJECTION INTRAMUSCULAR; INTRAVENOUS
Status: DISCONTINUED | OUTPATIENT
Start: 2022-12-18 | End: 2022-12-23 | Stop reason: HOSPADM

## 2022-12-18 RX ORDER — METOPROLOL SUCCINATE 50 MG/1
50 TABLET, EXTENDED RELEASE ORAL DAILY
Status: DISCONTINUED | OUTPATIENT
Start: 2022-12-18 | End: 2022-12-23 | Stop reason: HOSPADM

## 2022-12-18 RX ORDER — LISINOPRIL 10 MG/1
10 TABLET ORAL DAILY
Status: DISCONTINUED | OUTPATIENT
Start: 2022-12-18 | End: 2022-12-23 | Stop reason: HOSPADM

## 2022-12-18 RX ORDER — ACETAMINOPHEN 650 MG/1
650 SUPPOSITORY RECTAL
Status: DISCONTINUED | OUTPATIENT
Start: 2022-12-18 | End: 2022-12-23 | Stop reason: HOSPADM

## 2022-12-18 RX ORDER — DEXTROSE MONOHYDRATE 100 MG/ML
0-250 INJECTION, SOLUTION INTRAVENOUS AS NEEDED
Status: DISCONTINUED | OUTPATIENT
Start: 2022-12-18 | End: 2022-12-23 | Stop reason: HOSPADM

## 2022-12-18 RX ORDER — ATORVASTATIN CALCIUM 10 MG/1
10 TABLET, FILM COATED ORAL
Status: DISCONTINUED | OUTPATIENT
Start: 2022-12-18 | End: 2022-12-23 | Stop reason: HOSPADM

## 2022-12-18 RX ORDER — ONDANSETRON 4 MG/1
4 TABLET, ORALLY DISINTEGRATING ORAL
Status: DISCONTINUED | OUTPATIENT
Start: 2022-12-18 | End: 2022-12-23 | Stop reason: HOSPADM

## 2022-12-18 RX ORDER — SODIUM CHLORIDE 0.9 % (FLUSH) 0.9 %
5-40 SYRINGE (ML) INJECTION EVERY 8 HOURS
Status: DISCONTINUED | OUTPATIENT
Start: 2022-12-18 | End: 2022-12-23 | Stop reason: HOSPADM

## 2022-12-18 RX ORDER — ACETAMINOPHEN 325 MG/1
650 TABLET ORAL
Status: DISCONTINUED | OUTPATIENT
Start: 2022-12-18 | End: 2022-12-23 | Stop reason: HOSPADM

## 2022-12-18 RX ORDER — SODIUM CHLORIDE 0.9 % (FLUSH) 0.9 %
5-40 SYRINGE (ML) INJECTION AS NEEDED
Status: DISCONTINUED | OUTPATIENT
Start: 2022-12-18 | End: 2022-12-23 | Stop reason: HOSPADM

## 2022-12-18 RX ORDER — GUAIFENESIN 100 MG/5ML
81 LIQUID (ML) ORAL DAILY
Status: DISCONTINUED | OUTPATIENT
Start: 2022-12-18 | End: 2022-12-23 | Stop reason: HOSPADM

## 2022-12-18 RX ORDER — BUMETANIDE 0.25 MG/ML
1 INJECTION INTRAMUSCULAR; INTRAVENOUS 2 TIMES DAILY
Status: DISCONTINUED | OUTPATIENT
Start: 2022-12-18 | End: 2022-12-23 | Stop reason: HOSPADM

## 2022-12-18 RX ORDER — LEVOTHYROXINE SODIUM 25 UG/1
50 TABLET ORAL
Status: DISCONTINUED | OUTPATIENT
Start: 2022-12-18 | End: 2022-12-23 | Stop reason: HOSPADM

## 2022-12-18 RX ORDER — HYDRALAZINE HYDROCHLORIDE 10 MG/1
10 TABLET, FILM COATED ORAL 2 TIMES DAILY
Status: DISCONTINUED | OUTPATIENT
Start: 2022-12-18 | End: 2022-12-23 | Stop reason: HOSPADM

## 2022-12-18 RX ORDER — POTASSIUM CHLORIDE 750 MG/1
40 TABLET, FILM COATED, EXTENDED RELEASE ORAL DAILY
Status: DISCONTINUED | OUTPATIENT
Start: 2022-12-18 | End: 2022-12-23 | Stop reason: HOSPADM

## 2022-12-18 RX ORDER — SPIRONOLACTONE 25 MG/1
25 TABLET ORAL DAILY
Status: DISCONTINUED | OUTPATIENT
Start: 2022-12-18 | End: 2022-12-23 | Stop reason: HOSPADM

## 2022-12-18 RX ORDER — MAGNESIUM SULFATE 100 %
4 CRYSTALS MISCELLANEOUS AS NEEDED
Status: DISCONTINUED | OUTPATIENT
Start: 2022-12-18 | End: 2022-12-23 | Stop reason: HOSPADM

## 2022-12-18 RX ORDER — AMIODARONE HYDROCHLORIDE 200 MG/1
100 TABLET ORAL DAILY
Status: DISCONTINUED | OUTPATIENT
Start: 2022-12-18 | End: 2022-12-23 | Stop reason: HOSPADM

## 2022-12-18 RX ORDER — ENOXAPARIN SODIUM 100 MG/ML
30 INJECTION SUBCUTANEOUS DAILY
Status: DISCONTINUED | OUTPATIENT
Start: 2022-12-18 | End: 2022-12-22

## 2022-12-18 RX ORDER — INSULIN LISPRO 100 [IU]/ML
INJECTION, SOLUTION INTRAVENOUS; SUBCUTANEOUS
Status: DISCONTINUED | OUTPATIENT
Start: 2022-12-18 | End: 2022-12-23 | Stop reason: HOSPADM

## 2022-12-18 RX ADMIN — METOPROLOL SUCCINATE 50 MG: 50 TABLET, EXTENDED RELEASE ORAL at 09:52

## 2022-12-18 RX ADMIN — HYDRALAZINE HYDROCHLORIDE 10 MG: 10 TABLET, FILM COATED ORAL at 10:09

## 2022-12-18 RX ADMIN — BUMETANIDE 1 MG: 0.25 INJECTION, SOLUTION INTRAMUSCULAR; INTRAVENOUS at 22:04

## 2022-12-18 RX ADMIN — POTASSIUM CHLORIDE 40 MEQ: 750 TABLET, FILM COATED, EXTENDED RELEASE ORAL at 15:54

## 2022-12-18 RX ADMIN — ASPIRIN 81 MG CHEWABLE TABLET 81 MG: 81 TABLET CHEWABLE at 09:53

## 2022-12-18 RX ADMIN — ATORVASTATIN CALCIUM 10 MG: 10 TABLET, FILM COATED ORAL at 22:04

## 2022-12-18 RX ADMIN — BUMETANIDE 1 MG: 0.25 INJECTION, SOLUTION INTRAMUSCULAR; INTRAVENOUS at 09:59

## 2022-12-18 RX ADMIN — AMIODARONE HYDROCHLORIDE 100 MG: 200 TABLET ORAL at 09:52

## 2022-12-18 RX ADMIN — PANTOPRAZOLE SODIUM 40 MG: 40 TABLET, DELAYED RELEASE ORAL at 09:52

## 2022-12-18 RX ADMIN — BUMETANIDE 2 MG: 0.25 INJECTION INTRAMUSCULAR; INTRAVENOUS at 00:35

## 2022-12-18 RX ADMIN — SPIRONOLACTONE 25 MG: 25 TABLET ORAL at 09:52

## 2022-12-18 RX ADMIN — ENOXAPARIN SODIUM 30 MG: 100 INJECTION SUBCUTANEOUS at 09:50

## 2022-12-18 RX ADMIN — LISINOPRIL 10 MG: 10 TABLET ORAL at 09:51

## 2022-12-18 RX ADMIN — SODIUM CHLORIDE, PRESERVATIVE FREE 10 ML: 5 INJECTION INTRAVENOUS at 22:04

## 2022-12-18 RX ADMIN — LEVOTHYROXINE SODIUM 50 MCG: 0.03 TABLET ORAL at 06:35

## 2022-12-18 RX ADMIN — SODIUM CHLORIDE, PRESERVATIVE FREE 10 ML: 5 INJECTION INTRAVENOUS at 15:54

## 2022-12-18 RX ADMIN — SODIUM CHLORIDE, PRESERVATIVE FREE 10 ML: 5 INJECTION INTRAVENOUS at 09:53

## 2022-12-18 RX ADMIN — HYDRALAZINE HYDROCHLORIDE 10 MG: 10 TABLET, FILM COATED ORAL at 22:04

## 2022-12-18 NOTE — CONSULTS
Cardiology Consult    NAME: Myles Ball   :  1950   MRN:  [id number]     Date/Time:  2022 10:30 AM    Patient PCP: Ashley Mejia MD  ________________________________________________________________________     Assessment/Plan:   Shortness of breath/CHF with fluid overload, known heart failure? Improved LV function in the past, acknowledges that she does  forget to take her medicines. Troponin leak, flat, will repeat EKG and troponin most likely type II MI. History of SVT, status post ablation, status post ICD, on amiodarone. Hypothyroidism, on levothyroxine, Will check TSH. Chronic kidney disease, improved creatinine from before. Diabetes mellitus    Edema, skin breakdown, impaired mobility, will check D-dimer. []        High complexity decision making was performed        Subjective:   CHIEF COMPLAINT:   Shortness of breath and leg edema    REASON FOR CONSULT:    CHF exacerbation  HISTORY OF PRESENT ILLNESS:     Myles Ball is a 74 y.o. BLACK/ female who presents with increasing shortness of breath for the past 2 weeks. Leg edema also getting worse. Patient has been less mobile with her worsening leg edema. Patient found to have mild troponin leak and reason for transfer from outside hospital.  Patient does take care of  with dementia. Is now admitted to the [facility]. Her daughter does stay with her. They have been trying to get her to move more, she has not been walking around much. Patient acknowledges that she forgets to take her medications. History of heart failure, does follow-up with Dr. Jaun Cameron, improved LV function as reported. Will repeat echo. Continue diuresis. Continue ACE inhibitor and beta-blockade. History of SVT status post ablation, status post ICD.         Past Medical History:   Diagnosis Date    CHF (congestive heart failure) (HCC)     Diabetes ([facility] [address]     Hypertension     Menopause       Past Surgical History:   Procedure Laterality Date    HX IMPLANTABLE CARDIOVERTER DEFIBRILLATOR       Allergies   Allergen Reactions    Contrast Agent [Iodine] Nausea and Vomiting      Meds:  See below  Social History     Tobacco Use    Smoking status: Never    Smokeless tobacco: Never   Substance Use Topics    Alcohol use: Not on file      No family history on file. REVIEW OF SYSTEMS:     []         Unable to obtain  ROS due to ---   [x]         Total of 12 systems reviewed as follows:    Constitutional: negative fever, negative chills, negative weight loss  Eyes:   negative visual changes  ENT:   negative sore throat, tongue or lip swelling  Respiratory:  negative cough, negative dyspnea  Cards:               HPI   GI:   negative for nausea, vomiting, diarrhea, and abdominal pain  Genitourinary: negative for frequency, dysuria  Integument:  negative for rash   Hematologic:  negative for easy bruising and gum/nose bleeding  Musculoskel: negative for myalgias,  back pain  Neurological:  negative for headaches, dizziness, vertigo, weakness  Behavl/Psych: negative for feelings of anxiety, depression     Pertinent Positives include :    Objective:      Physical Exam:    Last 24hrs VS reviewed since prior progress note. Most recent are:    Visit Vitals  BP (!) 120/100   Pulse (!) 106   Temp 97.8 °F (36.6 °C)   Resp 21   Ht 5' 3\" (1.6 m)   Wt 105.1 kg (231 lb 12.8 oz)   SpO2 94%   BMI 41.06 kg/m²     No intake or output data in the 24 hours ending 12/18/22 1030     General Appearance: Well developed, alert, no acute distress. Ears/Nose/Mouth/Throat: Pupils equal and round, Hearing grossly normal.  Neck: Supple. Elevated JVP . Carotids good upstrokes, with no bruit. Chest: Lungs clear to auscultation bilaterally. Cardiovascular: Regular rate and rhythm, S1S2 normal, no murmur, rubs, gallops. Abdomen: Soft, non-tender, bowel sounds are active. No organomegaly. Extremities: 2+ edema bilaterally.   Skin breakdown  Skin: Warm and dry. Neuro: Alert, normal speech; follows simple commands  Psychiatric: Cooperative,   []         Post-cath site without hematoma, bruit, tenderness, or thrill. Distal pulses intact. Data:      Telemetry:    EKG:  []  No new EKG for review. EKG with ventricular paced rhythm  Chest x-ray with bibasilar atelectasis, pleural effusion left, cardiomegaly      Prior to Admission medications    Medication Sig Start Date End Date Taking? Authorizing Provider   aspirin delayed-release 81 mg tablet 1 tablet   Yes Other, MD Raheem   hydrALAZINE (APRESOLINE) 10 mg tablet Take 1 Tablet by mouth two (2) times a day. 8/22/22 8/22/23 Yes Ayan, MD Raheem   levothyroxine (SYNTHROID) 50 mcg tablet 1 tablet in the morning on an empty stomach   Yes Raheem Botello MD   simvastatin (ZOCOR) 20 mg tablet simvastatin 20 mg tablet   TAKE 1 TABLET BY MOUTH EVERY DAY IN THE EVENING 90   Yes Ayan, MD Raheem   Insulin Needles, Disposable, 31 gauge x 3/16\" ndle BD Ultra-Fine Mini Pen Needle 31 gauge x 3/16\"   AS DIRECTED CHECK SUGAR THREE TIMES A DAY THREE TIMES A DAY DIAGNOSIS  E11.22 30   Yes Ayan, MD Raheem   pantoprazole (PROTONIX) 40 mg tablet Take 40 mg by mouth daily. Yes Ayan, MD Raheem   allopurinoL (ZYLOPRIM) 300 mg tablet Take 300 mg by mouth daily. Yes Other, MD Raheem   spironolactone (ALDACTONE) 25 mg tablet Take 25 mg by mouth daily. Yes Ayan, MD Raheem   metoprolol succinate (TOPROL-XL) 50 mg XL tablet Take 50 mg by mouth daily. Yes Ayan, MD Raheem   amiodarone (PACERONE) 100 mg tablet Take 100 mg by mouth daily. Yes Other, MD Raheem   furosemide (LASIX) 40 mg tablet Take 40 mg by mouth two (2) times a day.    Yes Ayan, MD Raheem   captopriL (CAPOTEN) 25 mg tablet captopril 25 mg tablet    Other, MD Raheem   dulaglutide (Trulicity) 1.5 TF/4.1 mL sub-q pen Trulicity 1.5 NK/8.2 mL subcutaneous pen injector   1.5 MG ONCE A WEEK SUBCUTANEOUSLY 30    Other, MD Raheem   insulin degludec (TRESIBA) 200 unit/mL (3 mL) inpn pen Maxene Mannheim FlexTouch U-200 insulin 200 unit/mL (3 mL) subcutaneous pen    Raheem Botello MD   insulin lispro (HUMALOG) 100 unit/mL kwikpen Humalog KwikPen (U-100) Insulin 100 unit/mL subcutaneous    Raheem Botello MD   potassium chloride (K-DUR, KLOR-CON M20) 20 mEq tablet Klor-Con M20 mEq tablet,extended release    Raheem Botello MD   sacubitriL-valsartan Wolf Pointerna Ng) 24-26 mg tablet Entresto 24 mg-26 mg tablet    Raheem Botello MD   traZODone (DESYREL) 50 mg tablet Take 50 mg by mouth At bedtime. Raheem Botello MD   sodium polystyrene (KAYEXALATE) 15 gram/60 mL suspension Take  by mouth daily. Raheem Botello MD   sodium bicarbonate 650 mg tablet Take 650 mg by mouth three (3) times daily.     Raheem Botello MD Alissa Harry, MD

## 2022-12-18 NOTE — ED TRIAGE NOTES
Pt brought in from home per EMS for eval SOB, swelling. Pt has hx CHF, reports she's been feeling bad \"for a while now. \" Denies pain. Pitting edema noted to BLE.

## 2022-12-18 NOTE — PROGRESS NOTES
Reason for Admission:  SOB                     RUR Score:   15%                  Plan for utilizing home health:   Not at this time       PCP: First and Last name:  Glory Sharma MD     Name of Practice:    Are you a current patient: Yes/No:    Approximate date of last visit: Appointment on Dec. 27   Can you participate in a virtual visit with your PCP:                     Current Advanced Directive/Advance Care Plan: Full Code      Healthcare Decision Maker:   Click here to complete [place] including selection of the Healthcare Decision Maker Relationship (ie \"Primary\")      Fabienne Anders, daughter, [phone]                       Transition of Care Plan:      Met f/f with Pt, she confirmed that the information on the face sheet is correct. Pt stated that she lives with her . Pt stated that her  has Alzheimer's and that he is staying with one of her daughter while she is in the hospital.    Pt stated no HH, she has a cane and transport chair, she stated that her daughter assist her with bathing and dressing. Pt stated that she uses  [facility] in [facility]. Pt stated that family will give her a ride home when she is D/C. CM dispo: home with no needs at this time.

## 2022-12-18 NOTE — ED PROVIDER NOTES
Patient presents with complaint of increasing shortness of breath, over past few weeks . She reports MOSES and increased swelling of her legs . She has chest pain when she gets up at night and also has swelling of her left breast for past 3 weeks. Patient lives with her  who has Dementia. Patient is on PO Coumadin. Social History : No alcohol or tobacco.       Past Medical History:   Diagnosis Date    CHF (congestive heart failure) ([facility] [address]     Diabetes ([facility] [address]     Hypertension     Menopause        Past Surgical History:   Procedure Laterality Date    HX IMPLANTABLE CARDIOVERTER DEFIBRILLATOR           History reviewed. No pertinent family history. Social History     Socioeconomic History    Marital status:      Spouse name: Not on file    Number of children: Not on file    Years of education: Not on file    Highest education level: Not on file   Occupational History    Not on file   Tobacco Use    Smoking status: Never    Smokeless tobacco: Never   Substance and Sexual Activity    Alcohol use: Not on file    Drug use: Not on file    Sexual activity: Not on file   Other Topics Concern    Not on file   Social History Narrative    Not on file     Social Determinants of Health     Financial Resource Strain: Not on file   Food Insecurity: Not on file   Transportation Needs: Not on file   Physical Activity: Not on file   Stress: Not on file   Social Connections: Not on file   Intimate Partner Violence: Not on file   Housing Stability: Not on file         ALLERGIES: Contrast agent [iodine]    Review of Systems   Constitutional: Negative. HENT: Negative. Eyes: Negative. Respiratory:  Positive for shortness of breath. Cardiovascular:  Positive for chest pain and leg swelling. Gastrointestinal: Negative. Endocrine: Negative. Genitourinary: Negative. Skin: Negative. Allergic/Immunologic: Negative. Neurological:  Positive for weakness. Hematological: Negative. Psychiatric/Behavioral: Negative. All other systems reviewed and are negative. Vitals:    12/17/22 2034 12/17/22 2039   BP: (!) 153/107    Pulse: (!) 105    Resp: 24    Temp: 99 °F (37.2 °C)    SpO2: 97% 97%   Weight: 105.1 kg (231 lb 12.8 oz)    Height: 5' 3\" (1.6 m)             Physical Exam  Vitals and nursing note reviewed. Constitutional:       Appearance: She is well-developed. She is obese. HENT:      Head: Normocephalic and atraumatic. Cardiovascular:      Rate and Rhythm: Tachycardia present. Rhythm irregular. Heart sounds: Normal heart sounds. Pulmonary:      Breath sounds: Examination of the right-lower field reveals rales. Examination of the left-lower field reveals rales. Rales present. Chest:          Comments: Left breast moderately swollen. Abdominal:      General: Bowel sounds are normal.      Palpations: Abdomen is soft. Musculoskeletal:         General: Normal range of motion. Cervical back: Normal range of motion and neck supple. Right lower leg: Edema present. Left lower leg: Edema present. Comments: Marked edema in lower legs. Neurological:      General: No focal deficit present. Mental Status: She is alert.    Psychiatric:         Mood and Affect: Mood normal.         Behavior: Behavior normal.        MDM     Amount and/or Complexity of Data Reviewed  Clinical lab tests: ordered and reviewed  Tests in the radiology section of CPT®: ordered and reviewed  Review and summarize past medical records: yes  Discuss the patient with other providers: (Discussed with Dr Roselia Painter at [facility] and he accepts patient as a transfer)    Risk of Complications, Morbidity, and/or Mortality  Presenting problems: moderate  Diagnostic procedures: moderate  Management options: moderate    Patient Progress  Patient progress: stable         Procedures

## 2022-12-18 NOTE — ROUTINE PROCESS
TRANSFER - OUT REPORT:    Verbal report given to Leland mcintyre RN(name) called for Report Sheet on [address] [facility] [place]  being transferred to [facility](unit) for routine progression of care       Report consisted of patients Situation, Background, Assessment and   Recommendations(SBAR). Information from the following report(s) SBAR, ED Summary, Procedure Summary, Intake/Output, MAR, Recent Results, and Cardiac Rhythm Vpaced  was reviewed with the receiving nurse. Lines:   Peripheral IV 12/18/22 Distal;Posterior;Right Forearm (Active)        Opportunity for questions and clarification was provided.       Patient transported with:   Registered Nurse  Tech from floor  [facility]

## 2022-12-18 NOTE — PROGRESS NOTES
Problem: Falls - Risk of  Goal: *Absence of Falls  Description: Document Sofia Fothergill Fall Risk and appropriate interventions in the flowsheet. Outcome: Progressing Towards Goal  Note: Fall Risk Interventions:                                Problem: Patient Education: Go to Patient Education Activity  Goal: Patient/Family Education  Outcome: Progressing Towards Goal     Problem: Pressure Injury - Risk of  Goal: *Prevention of pressure injury  Description: Document Isidro Scale and appropriate interventions in the flowsheet.   Outcome: Progressing Towards Goal  Note: Pressure Injury Interventions:  Sensory Interventions: Assess changes in LOC    Moisture Interventions: Absorbent underpads    Activity Interventions: Increase time out of bed, PT/OT evaluation    Mobility Interventions: HOB 30 degrees or less, PT/OT evaluation    Nutrition Interventions: Document food/fluid/supplement intake    Friction and Shear Interventions: HOB 30 degrees or less                Problem: Patient Education: Go to Patient Education Activity  Goal: Patient/Family Education  Outcome: Progressing Towards Goal

## 2022-12-18 NOTE — H&P
History and Physical    Patient: Malika Duncan MRN: [id number]  SSN: [id number]    YOB: 1950  Age: 74 y.o. Sex: female      Subjective:      Malika Duncan is a 74 y.o. female with PMH of CHF with pacer, atrial fibrillation, diabetes, and hypertension presented to outside ED with chief complaint of shortness of breath for the past week. Unfortunately poor historian and unable to provide detailed history. She reports dyspnea with exertion and LE edema, but denies orthopnea. To me, she denies any chest pain for the past few weeks. From outside ED, BNP > 71486, troponin 131. Chest X-ray showed pulmonary edema. In our ED, vital signs stable, not hypoxic. Denies chest pain or shortness of breath on my evaluation. Past Medical History:   Diagnosis Date    CHF (congestive heart failure) ([facility] [address]     Diabetes ([facility] [address]     Hypertension     Menopause      Past Surgical History:   Procedure Laterality Date    HX IMPLANTABLE CARDIOVERTER DEFIBRILLATOR        No family history on file. Social History     Tobacco Use    Smoking status: Never    Smokeless tobacco: Never   Substance Use Topics    Alcohol use: Not on file      Prior to Admission medications    Medication Sig Start Date End Date Taking? Authorizing Provider   aspirin delayed-release 81 mg tablet 1 tablet   Yes Other, MD Raheem   hydrALAZINE (APRESOLINE) 10 mg tablet Take 1 Tablet by mouth two (2) times a day.  8/22/22 8/22/23 Yes Other, MD Raheem   levothyroxine (SYNTHROID) 50 mcg tablet 1 tablet in the morning on an empty stomach   Yes Other, MD Raheem   simvastatin (ZOCOR) 20 mg tablet simvastatin 20 mg tablet   TAKE 1 TABLET BY MOUTH EVERY DAY IN THE EVENING 90   Yes Other, MD Raheem   Insulin Needles, Disposable, 31 gauge x 3/16\" ndle BD Ultra-Fine Mini Pen Needle 31 gauge x 3/16\"   AS DIRECTED CHECK SUGAR THREE TIMES A DAY THREE TIMES A DAY DIAGNOSIS  E11.22 30   Yes Ayan, MD Raheem   pantoprazole (PROTONIX) 40 mg tablet Take 40 mg by mouth daily. Yes Raheem Botello MD   allopurinoL (ZYLOPRIM) 300 mg tablet Take 300 mg by mouth daily. Yes Raheem Botello MD   spironolactone (ALDACTONE) 25 mg tablet Take 25 mg by mouth daily. Yes Raheem Botello MD   metoprolol succinate (TOPROL-XL) 50 mg XL tablet Take 50 mg by mouth daily. Yes Raheem Botello MD   amiodarone (PACERONE) 100 mg tablet Take 100 mg by mouth daily. Yes Raheem Botello MD   furosemide (LASIX) 40 mg tablet Take 40 mg by mouth two (2) times a day. Yes Raheem Botello MD   captopriL (CAPOTEN) 25 mg tablet captopril 25 mg tablet    Raheem Botello MD   dulaglutide (Trulicity) 1.5 JX/6.9 mL sub-q pen Trulicity 1.5 WC/8.6 mL subcutaneous pen injector   1.5 MG ONCE A WEEK SUBCUTANEOUSLY 30    Raheem Botello MD   insulin degludec (TRESIBA) 200 unit/mL (3 mL) inpn pen [place] FlexTouch U-200 insulin 200 unit/mL (3 mL) subcutaneous pen    Raheem Botello MD   insulin lispro (HUMALOG) 100 unit/mL kwikpen Humalog KwikPen (U-100) Insulin 100 unit/mL subcutaneous    Raheem Botello MD   potassium chloride (K-DUR, KLOR-CON M20) 20 mEq tablet Klor-Con M20 mEq tablet,extended release    Raheem Botello MD   sacubitriL-valsartan Sophieradha Martinez) 24-26 mg tablet Entresto 24 mg-26 mg tablet    Raheem Botello MD   traZODone (DESYREL) 50 mg tablet Take 50 mg by mouth At bedtime. Raheem Botello MD   sodium polystyrene (KAYEXALATE) 15 gram/60 mL suspension Take  by mouth daily. Raheem Botello MD   sodium bicarbonate 650 mg tablet Take 650 mg by mouth three (3) times daily. Raheem Botello MD        Allergies   Allergen Reactions    Contrast Agent [Iodine] Nausea and Vomiting       Review of Systems:   Constitutional: No fevers, No chills, No fatigue, No weakness  Eyes: No visual disturbance  Ears, Nose, Mouth, Throat, and Face: No nasal congestion, No sore throat  Respiratory: See HPI.  Positive for shortness of breath   Cardiovascular: No chest pain, No lower extremity edema, No Palpitations   Gastrointestinal: No nausea, No vomiting, No diarrhea, No constipation, No abdominal pain  Genitourinary: No frequency, No dysuria, No hematuria  Integument/Breast: No rash, No skin lesion(s), No dryness  Musculoskeletal: No arthralgias, No neck pain, No back pain  Neurological: No headaches, No dizziness, No confusion,  No seizures  Behavioral/Psychiatric: No anxiety, No depression      Objective:     Vitals:    12/17/22 2323 12/17/22 2335 12/18/22 0016 12/18/22 0056   BP: (!) 167/79 (!) 136/96     Pulse: 90 (!) 102     Resp: 21 21     Temp:    97.8 °F (36.6 °C)   SpO2: 95% 94%     Weight:   105.1 kg (231 lb 12.8 oz)    Height:   5' 3\" (1.6 m)         Physical Exam:   General: confused, cooperative, no distress  Eye: Omitted as patient is unable to cooperate. Throat and Neck: Omitted as patient is unable to cooperate. Lung: clear to auscultation bilaterally  Heart: regular rate and rhythm,   Abdomen: soft, non-tender. Bowel sounds normal. No masses,  Extremities: 2+ LE edema. able to move all extremities normal, atraumatic  Skin: Normal.  Neurologic: Omitted as patient is unable to cooperate.     Psychiatric: non focal    Recent Results (from the past 24 hour(s))   EKG, 12 LEAD, INITIAL    Collection Time: 12/17/22  8:32 PM   Result Value Ref Range    Ventricular Rate 109 BPM    Atrial Rate 108 BPM    P-R Interval 100 ms    QRS Duration 163 ms    Q-T Interval 425 ms    QTC Calculation (Bezet) 573 ms    Calculated P Axis -63 degrees    Calculated R Axis -123 degrees    Calculated T Axis 67 degrees    Diagnosis       Afib/flut and V-paced complexes  No further analysis attempted due to paced rhythm  Baseline wander in lead(s) V2     CBC WITH AUTOMATED DIFF    Collection Time: 12/17/22  8:40 PM   Result Value Ref Range    WBC 5.6 3.6 - 11.0 K/uL    RBC 4.11 3.80 - 5.20 M/uL    HGB 11.5 11.5 - 16.0 g/dL    HCT 36.6 35.0 - 47.0 %    MCV 89.1 80.0 - 99.0 FL    MCH 28.0 26.0 - 34.0 PG    MCHC 31.4 30.0 - 36.5 g/dL    RDW 16.4 (H) 11.5 - 14.5 %    PLATELET 786 150 - 400 K/uL    MPV 9.3 8.9 - 12.9 FL    NRBC 0.7 (H) 0.0  WBC    ABSOLUTE NRBC 0.04 (H) 0.00 - 0.01 K/uL    NEUTROPHILS 59 32 - 75 %    LYMPHOCYTES 25 12 - 49 %    MONOCYTES 15 (H) 5 - 13 %    EOSINOPHILS 0 0 - 7 %    BASOPHILS 1 0 - 1 %    IMMATURE GRANULOCYTES 0 0 - 0.5 %    ABS. NEUTROPHILS 3.3 1.8 - 8.0 K/UL    ABS. LYMPHOCYTES 1.4 0.8 - 3.5 K/UL    ABS. MONOCYTES 0.8 0.0 - 1.0 K/UL    ABS. EOSINOPHILS 0.0 0.0 - 0.4 K/UL    ABS. BASOPHILS 0.1 0.0 - 0.1 K/UL    ABS. IMM. GRANS. 0.0 0.00 - 0.04 K/UL    DF AUTOMATED      RBC COMMENTS Normocytic, Normochromic      RBC COMMENTS Normocytic, Normochromic      RBC COMMENTS       Corrected on 12/18 AT 0219: Previously reported as Normocytic, Normochromic   METABOLIC PANEL, COMPREHENSIVE    Collection Time: 12/17/22  8:40 PM   Result Value Ref Range    Sodium 144 136 - 145 mmol/L    Potassium 4.1 3.5 - 5.1 mmol/L    Chloride 106 97 - 108 mmol/L    CO2 29 21 - 32 mmol/L    Anion gap 9 5 - 15 mmol/L    Glucose 154 (H) 65 - 100 mg/dL    BUN 31 (H) 6 - 20 mg/dL    Creatinine 2.22 (H) 0.55 - 1.02 mg/dL    BUN/Creatinine ratio 14 12 - 20      eGFR 23 (L) >60 ml/min/1.73m2    Calcium 8.3 (L) 8.5 - 10.1 mg/dL    Bilirubin, total 1.0 0.2 - 1.0 mg/dL    AST (SGOT) 26 15 - 37 U/L    ALT (SGPT) 22 12 - 78 U/L    Alk.  phosphatase 136 (H) 45 - 117 U/L    Protein, total 7.3 6.4 - 8.2 g/dL    Albumin 3.2 (L) 3.5 - 5.0 g/dL    Globulin 4.1 (H) 2.0 - 4.0 g/dL    A-G Ratio 0.8 (L) 1.1 - 2.2     PROTHROMBIN TIME + INR    Collection Time: 12/17/22  8:40 PM   Result Value Ref Range    Prothrombin time 14.5 11.9 - 14.6 sec    INR 1.2 (H) 0.9 - 1.1     D DIMER    Collection Time: 12/17/22  8:40 PM   Result Value Ref Range    D DIMER 3.26 (H) <0.50 ug/ml(FEU)   NT-PRO BNP    Collection Time: 12/17/22  8:40 PM   Result Value Ref Range    NT pro-BNP >35,000 (H) <125 pg/mL   TROPONIN-HIGH SENSITIVITY    Collection Time: 12/17/22  8:40 PM   Result Value Ref Range    Troponin-High Sensitivity 131 (HH) 0 - 51 ng/L   COVID-19 WITH INFLUENZA A/B    Collection Time: 12/17/22  8:40 PM   Result Value Ref Range    SARS-CoV-2 by PCR Not Detected Not Detected      Influenza A by PCR Not Detected Not Detected      Influenza B by PCR Not Detected Not Detected     LACTIC ACID    Collection Time: 12/17/22  8:40 PM   Result Value Ref Range    Lactic acid 2.0 0.4 - 2.0 mmol/L   METABOLIC PANEL, COMPREHENSIVE    Collection Time: 12/17/22 11:57 PM   Result Value Ref Range    Sodium 144 136 - 145 mmol/L    Potassium 3.4 (L) 3.5 - 5.1 mmol/L    Chloride 109 (H) 97 - 108 mmol/L    CO2 26 21 - 32 mmol/L    Anion gap 9 5 - 15 mmol/L    Glucose 127 (H) 65 - 100 mg/dL    BUN 27 (H) 6 - 20 mg/dL    Creatinine 1.99 (H) 0.55 - 1.02 mg/dL    BUN/Creatinine ratio 14 12 - 20      eGFR 26 (L) >60 ml/min/1.73m2    Calcium 8.4 (L) 8.5 - 10.1 mg/dL    Bilirubin, total 1.0 0.2 - 1.0 mg/dL    AST (SGOT) 23 15 - 37 U/L    ALT (SGPT) 18 12 - 78 U/L    Alk. phosphatase 133 (H) 45 - 117 U/L    Protein, total 6.6 6.4 - 8.2 g/dL    Albumin 3.2 (L) 3.5 - 5.0 g/dL    Globulin 3.4 2.0 - 4.0 g/dL    A-G Ratio 0.9 (L) 1.1 - 2.2     TROPONIN-HIGH SENSITIVITY    Collection Time: 12/17/22 11:57 PM   Result Value Ref Range    Troponin-High Sensitivity 140 (HH) 0 - 51 ng/L   PROTHROMBIN TIME + INR    Collection Time: 12/17/22 11:57 PM   Result Value Ref Range    Prothrombin time 15.5 (H) 11.9 - 14.6 sec    INR 1.2 (H) 0.9 - 1.1     PTT    Collection Time: 12/17/22 11:57 PM   Result Value Ref Range    aPTT 29.8 21.2 - 34.1 sec    aPTT, therapeutic range   82 - 109 sec   MAGNESIUM    Collection Time: 12/17/22 11:57 PM   Result Value Ref Range    Magnesium 2.1 1.6 - 2.4 mg/dL   NT-PRO BNP    Collection Time: 12/18/22  1:30 AM   Result Value Ref Range    NT pro-BNP 30,627 (H) <125 pg/mL       XR Results (maximum last 3):   Results from [place] encounter on 12/17/22    XR CHEST PORT    Narrative  PORTABLE CHEST RADIOGRAPH/S: 12/17/2022 8:48 PM    INDICATION: Dyspnea. COMPARISON: None. TECHNIQUE: Portable frontal radiograph/s of the chest.    FINDINGS:  The lungs are hypoinflated, and there is bibasilar atelectasis and pulmonary  vascular crowding. There is probably a small left pleural effusion. The central  airways are patent. No pneumothorax. The heart is enlarged, even portable  technique, and there is a pacemaker/AICD. Impression  Shallow volumes, bibasilar atelectasis, pulmonary vascular crowding. Small left  pleural effusion. Cardiomegaly. CT Results (maximum last 3): No results found for this or any previous visit. MRI Results (maximum last 3): No results found for this or any previous visit. Nuclear Medicine Results (maximum last 3): No results found for this or any previous visit. US Results (maximum last 3): No results found for this or any previous visit. Pt's CHOCO Score = 4    CHOCO Score Calculation (1 point for each):  - Age >= 72  - Aspirin use in the last 7 days   - At least 2 angina episodes within the last 24hrs  - ST changes of at least 0.5mm on admission EKG  - Elevated serum cardiac biomarkers  - Known Coronary Artery Disease (CAD) (coronary stenosis >= 50%)  - At least 3 risk factors for CAD:  Hypertension -> 140/90 or on antihypertensives, Cigarette smoking, HDL < 40, Diabetes, Family history of premature CAD (CAD in male first-degree relative, or father less than 54, or female first-degree relative or mother less than 72). Score Interpretation:  % risk at 14 days of: all-cause mortality, new or recurrent MI, or severe recurrent ischemia requiring urgent revascularization.   Score of 0-1 = 4.7% risk  Score of 2 = 8.3% risk  Score of 3 = 13.2% risk  Score of 4 = 19.9% risk  Score of 5 = 26.2% risk  Score of 6-7 = at least 40.9% risk    Two or More Episodes of Severe Angina within 24 Hours: 0  Elevated Cardiac Markers: 1  ST Changes > 0.5 mm: 0  Aspirin Use in the Past 7 Days: 1  Age 65+: 1  3+ CAD Risk Factors: 1  CAD Stenosis >= 50%: 0  CHOCO Risk Score (Calculated): 4         Assessment and plan:   # CHF exacerbation  - IV Bumex  - ordered ECHO   - Monitor I/O  - Continue home medications. PO metoprolol and aldactone. - Consult cardiology   - Low sodium diet. - Suspect contributing to elevated troponin. CHOCO 4, however no chest pain. Hold off IV heparin ggt. # Elevated troponin  - suspect secondary to above  - Down-trending. Peaked at 140. # Hypertension   - Continue home medications. PO hydralazine, spironolactone, metoprolol. # Atrial fibrillation   - Paced rhythm on my evaluation.   - Continue PO amiodarone and warfarin. # Diabetes  - POC + correctional insulin  - Check HbA1c. # CKD stage IV (eGRR 15-29)  - creatinine stable. Continue to monitor on diuretics. # Hypothyroidism  - Continue home medications. PO levothyroxine. # Full code by default, need further clarification    # Medication reconciliation: Medication list reviewed on Epic and/or outside documentation. Not reviewed with patient. However, medication reconciliation incomplete, appreciate assistance from pharmacy or nursing staff.     Signed By: Evan Garnica MD     December 18, 2022

## 2022-12-18 NOTE — ED PROVIDER NOTES
[facility]  EMERGENCY DEPARTMENT ENCOUNTER NOTE    Date: 12/17/2022  Patient Name: Minnie France    History of Presenting Illness     Chief Complaint   Patient presents with    Chest Pain     HPI: Minnie France, 74 y.o. female with a past medical history and outpatient medications as listed and reviewed below  presents for NSTEMI. Patient presented to an outside ER for increasing shortness of breath for the past few weeks mostly on exertion, in addition to exertional pressure like CP. She also has bilateral lower extremity pitting edema with progression of her edema to the extent of seeping of fluid from her lower extremities. She also reports intermittent pressure-like chest pains over the past few weeks. She is compliant with p.o. Coumadin, last dose yesterday, She is unsure of her EF %. Workup at OSH:    BNP was noted to be >96078. Trop 131. Received  and Lasix 40. Will add bumex given elevated creatinine at baseline. Ddimer elevated, however CTPE not done due to creatinine. CXR shows pulm edema. Otherwise workup was normal. Lactate is within normal limits. No concern for severe sepsis, septic shock, or ischemia. No significant electrolyte derangements. Creatinine is not elevated more than baseline range making LES unlikely. No significant transaminitis noted. Normal bilirubin. CBC does not show any evidence of acute process. Leukocytosis not present to suggest infection. Hemoglobin not suggestive of acute anemia. COVID and Flu negative. Patient was transferred here for escalation of care and admission for CHF exacerbation and mild NSTEMI. Patient did not have any complaints on route, no chest pain or shortness of breath. The shortness of breath only on exertion and describes orthopnea. No abdominal pain, nausea, or vomiting. No fevers or chills.       Medical History   I reviewed the medical, surgical, family, and social history, as well as allergies:    PCP: Jv Quinn MD    Past Medical History:  Past Medical History:   Diagnosis Date    CHF (congestive heart failure) ([facility] [address]     Diabetes ([facility] [address]     Hypertension     Menopause      Past Surgical History:  Past Surgical History:   Procedure Laterality Date    HX IMPLANTABLE CARDIOVERTER DEFIBRILLATOR       Current Outpatient Medications:  Current Outpatient Medications   Medication Instructions    allopurinoL (ZYLOPRIM) 300 mg, Oral, DAILY    amiodarone (PACERONE) 100 mg, Oral, DAILY    aspirin delayed-release 81 mg tablet 1 tablet    captopriL (CAPOTEN) 25 mg tablet captopril 25 mg tablet    dulaglutide (Trulicity) 1.5 IM/8.6 mL sub-q pen Trulicity 1.5 II/3.0 mL subcutaneous pen injector   1.5 MG ONCE A WEEK SUBCUTANEOUSLY 30    furosemide (LASIX) 40 mg, Oral, 2 TIMES DAILY    hydrALAZINE (APRESOLINE) 10 mg tablet 1 Tablet, Oral, 2 TIMES DAILY    insulin degludec (TRESIBA) 200 unit/mL (3 mL) inpn pen Tresiba FlexTouch U-200 insulin 200 unit/mL (3 mL) subcutaneous pen    insulin lispro (HUMALOG) 100 unit/mL kwikpen Humalog KwikPen (U-100) Insulin 100 unit/mL subcutaneous    Insulin Needles, Disposable, 31 gauge x 3/16\" ndle BD Ultra-Fine Mini Pen Needle 31 gauge x 3/16\"   AS DIRECTED CHECK SUGAR THREE TIMES A DAY THREE TIMES A DAY DIAGNOSIS  E11.22 30    levothyroxine (SYNTHROID) 50 mcg tablet 1 tablet in the morning on an empty stomach    metoprolol succinate (TOPROL-XL) 50 mg, Oral, DAILY    pantoprazole (PROTONIX) 40 mg, Oral, DAILY    potassium chloride (K-DUR, KLOR-CON M20) 20 mEq tablet Klor-Con M20 mEq tablet,extended release    sacubitriL-valsartan (Entresto) 24-26 mg tablet Entresto 24 mg-26 mg tablet    simvastatin (ZOCOR) 20 mg tablet simvastatin 20 mg tablet   TAKE 1 TABLET BY MOUTH EVERY DAY IN THE EVENING 90    sodium bicarbonate 650 mg, Oral, 3 TIMES DAILY    sodium polystyrene (KAYEXALATE) 15 gram/60 mL suspension Oral, DAILY    spironolactone (ALDACTONE) 25 mg, Oral, DAILY    traZODone (DESYREL) 50 mg, Oral, AT BEDTIME      Family History:  No family history on file. Social History:  Social History     Tobacco Use    Smoking status: Never    Smokeless tobacco: Never     Allergies: Allergies   Allergen Reactions    Contrast Agent [Iodine] Nausea and Vomiting       Review of Systems     Review of Systems  Negative: Positives and pertinent negatives as per HPI. All other systems were reviewed and are negative. Physical Exam & Vital Signs   Vital Signs - I reviewed the patient's vital signs. Patient Vitals for the past 12 hrs:   Temp Pulse Resp BP SpO2   12/18/22 0056 97.8 °F (36.6 °C) -- -- -- --   12/17/22 2335 -- (!) 102 21 (!) 136/96 94 %   12/17/22 2323 -- 90 21 (!) 167/79 95 %   12/17/22 2320 -- (!) 102 (!) 31 (!) 167/79 93 %   12/17/22 2305 -- -- -- (!) 153/103 --     Physical Exam:    GENERAL: awake, alert, cooperative, not in distress  HEENT:  * Pupils equal, EOMI  * Head atraumatic  CV:  * audible heart sounds  * warm and perfused extremities bilaterally  PULMONARY: Good air movement, no wheezes, noted crackles  ABDOMEN/: soft, no distension, no guarding, no abdominal tenderness  EXTREMITIES/BACK: warm and perfused, no tenderness, noted bilateral LE 4+ edema  SKIN: no rashes or signs of trauma  NEURO:  * Speech clear  * Moves U&LE to command    Medical Decision Making     Patient is a 74 y.o. female presenting for NSTEMI and CHF. Vitals reveal  HTN  and physical exam reveals  LE edema and crackles - anasarca . EKG showed  paced . Based on the history, physical exam, risk factors, and vital signs, differential includes: CHF, ACS, pulmonary edema, pleural effusion. We will give Bumex given her elevated creatinine till further diuresis. Unable to do CTPA due to her creatinine, and VQ scan would not be valuable given her pulmonary edema, however she is already on Coumadin and therefore the possibility of PE is less likely given her typical presentation of CHF.  Will avoid heparin as she is on Coumadin Will check INR PT PTT. See ED Course and Reassessment for evaluation and discussion. EMR Automatically Imported Results     Labs:  Recent Results (from the past 12 hour(s))   EKG, 12 LEAD, INITIAL    Collection Time: 12/17/22  8:32 PM   Result Value Ref Range    Ventricular Rate 109 BPM    Atrial Rate 108 BPM    P-R Interval 100 ms    QRS Duration 163 ms    Q-T Interval 425 ms    QTC Calculation (Bezet) 573 ms    Calculated P Axis -63 degrees    Calculated R Axis -123 degrees    Calculated T Axis 67 degrees    Diagnosis       Afib/flut and V-paced complexes  No further analysis attempted due to paced rhythm  Baseline wander in lead(s) V2     CBC WITH AUTOMATED DIFF    Collection Time: 12/17/22  8:40 PM   Result Value Ref Range    WBC 5.6 3.6 - 11.0 K/uL    RBC 4.11 3.80 - 5.20 M/uL    HGB 11.5 11.5 - 16.0 g/dL    HCT 36.6 35.0 - 47.0 %    MCV 89.1 80.0 - 99.0 FL    MCH 28.0 26.0 - 34.0 PG    MCHC 31.4 30.0 - 36.5 g/dL    RDW 16.4 (H) 11.5 - 14.5 %    PLATELET 770 208 - 789 K/uL    MPV 9.3 8.9 - 12.9 FL    NRBC 0.7 (H) 0.0  WBC    ABSOLUTE NRBC 0.04 (H) 0.00 - 0.01 K/uL    NEUTROPHILS PENDING %    LYMPHOCYTES PENDING %    MONOCYTES PENDING %    EOSINOPHILS PENDING %    BASOPHILS PENDING %    IMMATURE GRANULOCYTES PENDING %    ABS. NEUTROPHILS PENDING K/UL    ABS. LYMPHOCYTES PENDING K/UL    ABS. MONOCYTES PENDING K/UL    ABS. EOSINOPHILS PENDING K/UL    ABS. BASOPHILS PENDING K/UL    ABS. IMM. GRANS.  PENDING K/UL    DF PENDING    METABOLIC PANEL, COMPREHENSIVE    Collection Time: 12/17/22  8:40 PM   Result Value Ref Range    Sodium 144 136 - 145 mmol/L    Potassium 4.1 3.5 - 5.1 mmol/L    Chloride 106 97 - 108 mmol/L    CO2 29 21 - 32 mmol/L    Anion gap 9 5 - 15 mmol/L    Glucose 154 (H) 65 - 100 mg/dL    BUN 31 (H) 6 - 20 mg/dL    Creatinine 2.22 (H) 0.55 - 1.02 mg/dL    BUN/Creatinine ratio 14 12 - 20      eGFR 23 (L) >60 ml/min/1.73m2    Calcium 8.3 (L) 8.5 - 10.1 mg/dL Bilirubin, total 1.0 0.2 - 1.0 mg/dL    AST (SGOT) 26 15 - 37 U/L    ALT (SGPT) 22 12 - 78 U/L    Alk. phosphatase 136 (H) 45 - 117 U/L    Protein, total 7.3 6.4 - 8.2 g/dL    Albumin 3.2 (L) 3.5 - 5.0 g/dL    Globulin 4.1 (H) 2.0 - 4.0 g/dL    A-G Ratio 0.8 (L) 1.1 - 2.2     PROTHROMBIN TIME + INR    Collection Time: 12/17/22  8:40 PM   Result Value Ref Range    Prothrombin time 14.5 11.9 - 14.6 sec    INR 1.2 (H) 0.9 - 1.1     D DIMER    Collection Time: 12/17/22  8:40 PM   Result Value Ref Range    D DIMER 3.26 (H) <0.50 ug/ml(FEU)   NT-PRO BNP    Collection Time: 12/17/22  8:40 PM   Result Value Ref Range    NT pro-BNP >35,000 (H) <125 pg/mL   TROPONIN-HIGH SENSITIVITY    Collection Time: 12/17/22  8:40 PM   Result Value Ref Range    Troponin-High Sensitivity 131 (HH) 0 - 51 ng/L   COVID-19 WITH INFLUENZA A/B    Collection Time: 12/17/22  8:40 PM   Result Value Ref Range    SARS-CoV-2 by PCR Not Detected Not Detected      Influenza A by PCR Not Detected Not Detected      Influenza B by PCR Not Detected Not Detected     LACTIC ACID    Collection Time: 12/17/22  8:40 PM   Result Value Ref Range    Lactic acid 2.0 0.4 - 2.0 mmol/L   METABOLIC PANEL, COMPREHENSIVE    Collection Time: 12/17/22 11:57 PM   Result Value Ref Range    Sodium 144 136 - 145 mmol/L    Potassium 3.4 (L) 3.5 - 5.1 mmol/L    Chloride 109 (H) 97 - 108 mmol/L    CO2 26 21 - 32 mmol/L    Anion gap 9 5 - 15 mmol/L    Glucose 127 (H) 65 - 100 mg/dL    BUN 27 (H) 6 - 20 mg/dL    Creatinine 1.99 (H) 0.55 - 1.02 mg/dL    BUN/Creatinine ratio 14 12 - 20      eGFR 26 (L) >60 ml/min/1.73m2    Calcium 8.4 (L) 8.5 - 10.1 mg/dL    Bilirubin, total 1.0 0.2 - 1.0 mg/dL    AST (SGOT) 23 15 - 37 U/L    ALT (SGPT) 18 12 - 78 U/L    Alk.  phosphatase 133 (H) 45 - 117 U/L    Protein, total 6.6 6.4 - 8.2 g/dL    Albumin 3.2 (L) 3.5 - 5.0 g/dL    Globulin 3.4 2.0 - 4.0 g/dL    A-G Ratio 0.9 (L) 1.1 - 2.2     TROPONIN-HIGH SENSITIVITY    Collection Time: 12/17/22 11:57 PM   Result Value Ref Range    Troponin-High Sensitivity 140 (HH) 0 - 51 ng/L   MAGNESIUM    Collection Time: 12/17/22 11:57 PM   Result Value Ref Range    Magnesium 2.1 1.6 - 2.4 mg/dL   NT-PRO BNP    Collection Time: 12/18/22  1:30 AM   Result Value Ref Range    NT pro-BNP 30,627 (H) <125 pg/mL     Radiologic Studies:  CT Results  (Last 48 hours)      None          CXR Results  (Last 48 hours)                 12/17/22 2048  XR CHEST PORT Final result    Impression:  Shallow volumes, bibasilar atelectasis, pulmonary vascular crowding. Small left   pleural effusion. Cardiomegaly. Narrative:  PORTABLE CHEST RADIOGRAPH/S: 12/17/2022 8:48 PM       INDICATION: Dyspnea. COMPARISON: None. TECHNIQUE: Portable frontal radiograph/s of the chest.       FINDINGS:    The lungs are hypoinflated, and there is bibasilar atelectasis and pulmonary   vascular crowding. There is probably a small left pleural effusion. The central   airways are patent. No pneumothorax. The heart is enlarged, even portable   technique, and there is a pacemaker/AICD. Medications ordered:  Medications   bumetanide (BUMEX) injection 2 mg (2 mg IntraVENous Given 12/18/22 0035)       ED Course & Reassessment     ED Course:     ED Course as of 12/18/22 0159   Sun Dec 18, 2022   0121 Trop 131 - 140, will trend. No significant electrolyte derangements. Creatinine is not elevated more than baseline range making LES unlikely. No significant transaminitis noted. Normal bilirubin. [SS]   0122 Magnesium within normal limits. [SS]   0158 BNP elevated. [SS]      ED Course User Index  [SS] Oumou Escobar MD       Reassessment:    Will admit. Final Disposition     Admission: [place] [id number]    After completion of ED workup and discussion of results and diagnoses, patient was admitted to the hospital. Case was discussed with admitting provider.     Procedures, Critical Care, & Clinical Tools   Performed by: Diana Miner MD  Procedures     EKG interpretation (Preliminary):  Rhythm: Ventricular paced rhythm . Rate (approx.): 97. Axis: R axis;  ID interval: NA;  QRS: Ventricular paced rhythm;  ST/T wave: normal;  Other findings: paced. CRITICAL CARE DOCUMENTATION  CARDIOVASCULAR CRITICAL CARE NOTE :  11:11 PM    Critical care time is being documented due to the fulfillment of at least one of the following:    - Critical conditions: condition that acutely impairs one or more vital organ systems such that there is a high probability of imminent or life-threatening deterioration in condition. Examples are diagnoses including but not limited to Afib RVR, DKA, PE, Etc. .    - Critical interventions: an action whose failure to initiate would likely allow a sudden, clinically significant decline in the patient's condition. These include  Requirement of transfer or ICU admission  Contemplation or provision of tPA  Drip initiation (pressors, antiarrhythmics, heparin, etc.)  Antidotes given (narcan, charcoal, epi for anaphylaxis, etc..)  >=2L fluid bolus  >=3 Duonebs  >1 IV/IM doses of sedatives, antiepileptics, BP meds, rate control meds, adenosine. Procedures that are suggestive of critical care: chest tubes, cardioversion, BiPAP, IO, etc..    Critical care time is documented based on continuous or non-continuous provision of care that includes face-to-face time, placing orders, chart review, documentation, discussion with consultants, discussion with family. This time calculation is a best approximation and does not include time spent on CPR, EKG interpretation, central line placement, intubation, laceration repairs, and other separately billed procedures. Amount of Critical Care Time: 35minutes    Details of critical care provision is documented above.  A general summary is listed below:    IMPENDING DETERIORATION - Cardiovascular  ASSOCIATED RISK FACTORS - Cardiovascular Changes, Dysrhythmias  MANAGEMENT- Bedside Assessment  INTERPRETATION -  Xrays, ECG, and Blood Pressure  INTERVENTIONS - Hemodynamic and Metabolic interventions  CASE REVIEW - Hospitalist  TREATMENT RESPONSE - Stable  PERFORMED BY - Self    NOTES   :  During this entire length of time I was immediately available to the patient . Janet Espana MD        HEART SCORE  History: Moderately Suspicious  ECG: Nonspecific repolarization disturbance  Age: Greater than or equal to 65 years  Risk Factors: Greater than or equal to 3 risk factors, or history of atherosclerotic disease  Troponin: Greater than or equal to 3x normal limit   HEART Score Total : 8     Management   Scores 0-3: 0.9-1.7% risk of adverse cardiac event. In the HEART Score study, these patients were discharged (0.99% in the retrospective study, 1.7% in the prospective study)   Scores 4-6: 12-16.6% risk of adverse cardiac event. In the HEART Score study, these patients were admitted to the hospital. (11.6% retrospective, 16.6% prospective)   Scores ? 7: 50-65% risk of adverse cardiac event. In the HEART Score study, these patients were candidates for early invasive measures. (65.2% retrospective, 50.1% prospective)   A MACE (Major Adverse Cardiac Event) was defined as all-cause mortality, myocardial infarction, or coronary revascularization. Original/Primary Reference  Vasiliy CAMARENA, Alexis SHRESTHA, Mark YEBOAH. Chest pain in the emergency room: value of the HEART score. Neth Heart J. 2008;16(6):191-6. Validation  Vasiliy Delgado, Mark YEBOAH, et al. A prospective validation of the HEART score for chest pain patients at the emergency department. Int J Cardiol. 2013;168(3):2153-8. Vasiliy Delgado, Nila Hopkins et al. Chest pain in the emergency room: a multicenter validation of the HEART Score. Crit Pathw Cardiol. 2010;9(3):164-9. Lata KANDICE, Cullen RF, Shala CARDENAS, et al. The HEART Pathway Randomized Controlled Trial One Year Outcomes. Acad Emerg Med. 2018;       Diagnosis     Clinical Impression:   1. Acute on chronic congestive heart failure, unspecified heart failure type ([facility] [address]    2. Acute pulmonary edema (HCC)    3. Hypoxia    4. NSTEMI (non-ST elevated myocardial infarction) [facility])        Attestations:  Cory Johnson MD    Documentation Comments   - I am the first and primary provider for this patient and am the primary provider of record. - Initial assessment performed. The patients presenting problems have been discussed, and the staff are in agreement with the care plan formulated and outlined with them. I have encouraged them to ask questions as they arise throughout their visit. - Available medical records, nursing notes, old EKGs, and EMS run sheets (if patient was EMS transported) were reviewed    Please note that this dictation was completed with [employer], the computer voice recognition software. Quite often unanticipated grammatical, syntax, homophones, and other interpretive errors are inadvertently transcribed by the computer software. Please disregard these errors. Please excuse any errors that have escaped final proofreading.

## 2022-12-18 NOTE — PROGRESS NOTES
Hospitalist Progress Note            Daily Progress Note: 12/18/2022 9:31 AM  Hospital course:     Kaylee Clemens is a 74 y.o. female with PMH of CHF with pacer, SVT AICD, NICM, diabetes, and hypertension presented to outside ED with chief complaint of shortness of breath for the past week. Unfortunately poor historian and unable to provide detailed history. She reports dyspnea with exertion and LE edema, but denies orthopnea. On admission to the ED denies any chest pain for the past few weeks. From outside ED, BNP > 12318, troponin 131. Chest X-ray showed pulmonary edema. Admitted for further management of CHF exacerbation, non-STEMI, elevated troponins, elevated BNP, shortness of breath and tachycardia. Cardiology consulted. Started on IV Bumex. Monitoring electrolytes and renal function closely for replacement while on diuretics. Will order a 2D echo to assess heart function. Subjective:     Examined patient at the bedside. Alert and oriented. She states she is starting to feel better than when she first came in to the emergency room. Assessment/Plan:   Active Problems:    CHF exacerbation ([facility] [address] (12/18/2022)    # CHF exacerbation  #History of cardiomyopathy  - IV Bumex  - ordered ECHO   - Monitor I/O  - Continue home medications. PO metoprolol and aldactone. - Consult cardiology   - Low sodium diet. - Suspect contributing to elevated troponin. CHOCO 4, however no chest pain. Hold off IV heparin ggt. # Elevated troponin  - suspect secondary to above  - Down-trending. Peaked at 140. # Hypertension   - Continue home medications. PO hydralazine, spironolactone, metoprolol. #History of supraventricular tachycardia  - Paced rhythm on my evaluation.   - Continue PO amiodarone and warfarin. # Diabetes  - POC + correctional insulin  - Check HbA1c. # CKD stage IV (eGRR 15-29)  - creatinine stable. Continue to monitor on diuretics.       # Hypothyroidism  - Continue home medications. PO levothyroxine. DVT Prophylaxis: Lovenox  Code Status: Full Code  POA/NOK:    Disposition and discharge barriers:   Cardiology consult pending  IV diuresing  Ischemic work-up  Care Plan discussed with: Patient, staff, IDR team    Current Facility-Administered Medications   Medication Dose Route Frequency    bumetanide (BUMEX) injection 1 mg  1 mg IntraVENous BID    amiodarone (CORDARONE) tablet 100 mg  100 mg Oral DAILY    hydrALAZINE (APRESOLINE) tablet 10 mg  10 mg Oral BID    levothyroxine (SYNTHROID) tablet 50 mcg  50 mcg Oral 6am    metoprolol succinate (TOPROL-XL) XL tablet 50 mg  50 mg Oral DAILY    pantoprazole (PROTONIX) tablet 40 mg  40 mg Oral DAILY    atorvastatin (LIPITOR) tablet 10 mg  10 mg Oral QHS    spironolactone (ALDACTONE) tablet 25 mg  25 mg Oral DAILY    sodium chloride (NS) flush 5-40 mL  5-40 mL IntraVENous Q8H    sodium chloride (NS) flush 5-40 mL  5-40 mL IntraVENous PRN    acetaminophen (TYLENOL) tablet 650 mg  650 mg Oral Q6H PRN    Or    acetaminophen (TYLENOL) suppository 650 mg  650 mg Rectal Q6H PRN    polyethylene glycol (MIRALAX) packet 17 g  17 g Oral DAILY PRN    ondansetron (ZOFRAN ODT) tablet 4 mg  4 mg Oral Q8H PRN    Or    ondansetron (ZOFRAN) injection 4 mg  4 mg IntraVENous Q6H PRN    enoxaparin (LOVENOX) injection 30 mg  30 mg SubCUTAneous DAILY    insulin lispro (HUMALOG) injection   SubCUTAneous TIDAC    glucose chewable tablet 16 g  4 Tablet Oral PRN    glucagon (GLUCAGEN) injection 1 mg  1 mg IntraMUSCular PRN    dextrose 10% infusion 0-250 mL  0-250 mL IntraVENous PRN    lisinopriL (PRINIVIL, ZESTRIL) tablet 10 mg  10 mg Oral DAILY    aspirin chewable tablet 81 mg  81 mg Oral DAILY    potassium chloride SR (KLOR-CON 10) tablet 40 mEq  40 mEq Oral DAILY     Current Outpatient Medications   Medication Sig    aspirin delayed-release 81 mg tablet 1 tablet    hydrALAZINE (APRESOLINE) 10 mg tablet Take 1 Tablet by mouth two (2) times a day. levothyroxine (SYNTHROID) 50 mcg tablet 1 tablet in the morning on an empty stomach    simvastatin (ZOCOR) 20 mg tablet simvastatin 20 mg tablet   TAKE 1 TABLET BY MOUTH EVERY DAY IN THE EVENING 90    Insulin Needles, Disposable, 31 gauge x 3/16\" ndle BD Ultra-Fine Mini Pen Needle 31 gauge x 3/16\"   AS DIRECTED CHECK SUGAR THREE TIMES A DAY THREE TIMES A DAY DIAGNOSIS  E11.22 30    pantoprazole (PROTONIX) 40 mg tablet Take 40 mg by mouth daily. allopurinoL (ZYLOPRIM) 300 mg tablet Take 300 mg by mouth daily. spironolactone (ALDACTONE) 25 mg tablet Take 25 mg by mouth daily. metoprolol succinate (TOPROL-XL) 50 mg XL tablet Take 50 mg by mouth daily. amiodarone (PACERONE) 100 mg tablet Take 100 mg by mouth daily. furosemide (LASIX) 40 mg tablet Take 40 mg by mouth two (2) times a day. captopriL (CAPOTEN) 25 mg tablet captopril 25 mg tablet    dulaglutide (Trulicity) 1.5 KE/5.6 mL sub-q pen Trulicity 1.5 DM/4.7 mL subcutaneous pen injector   1.5 MG ONCE A WEEK SUBCUTANEOUSLY 30    insulin degludec (TRESIBA) 200 unit/mL (3 mL) inpn pen Tresiba FlexTouch U-200 insulin 200 unit/mL (3 mL) subcutaneous pen    insulin lispro (HUMALOG) 100 unit/mL kwikpen Humalog KwikPen (U-100) Insulin 100 unit/mL subcutaneous    potassium chloride (K-DUR, KLOR-CON M20) 20 mEq tablet Klor-Con M20 mEq tablet,extended release    sacubitriL-valsartan (Entresto) 24-26 mg tablet Entresto 24 mg-26 mg tablet    traZODone (DESYREL) 50 mg tablet Take 50 mg by mouth At bedtime. sodium polystyrene (KAYEXALATE) 15 gram/60 mL suspension Take  by mouth daily. sodium bicarbonate 650 mg tablet Take 650 mg by mouth three (3) times daily. REVIEW OF SYSTEMS    Review of Systems   Constitutional:  Positive for malaise/fatigue. HENT:  Positive for congestion. Respiratory:  Positive for shortness of breath. Cardiovascular:  Positive for leg swelling. Negative for chest pain. Musculoskeletal:  Positive for myalgias. Neurological:  Positive for weakness. Psychiatric/Behavioral:  The patient is nervous/anxious. Objective:     Visit Vitals  /76 (BP 1 Location: Left upper arm)   Pulse 82   Temp 97.7 °F (36.5 °C)   Resp 20   Ht 5' 3\" (1.6 m)   Wt 105.1 kg (231 lb 12.8 oz)   SpO2 100%   BMI 41.06 kg/m²      O2 Device: None (Room air)    Temp (24hrs), Av.2 °F (36.8 °C), Min:97.7 °F (36.5 °C), Max:99 °F (37.2 °C)        PHYSICAL EXAM:    Physical Exam  Constitutional:       Appearance: She is obese. She is ill-appearing. HENT:      Nose: Congestion present. Cardiovascular:      Rate and Rhythm: Regular rhythm. Tachycardia present. Pulmonary:      Effort: Respiratory distress present. Abdominal:      General: There is distension. Tenderness: There is no abdominal tenderness. Musculoskeletal:      Right lower leg: Edema present. Left lower leg: Edema present. Skin:     General: Skin is warm. Neurological:      Motor: Weakness present. Data Review        No orders to display       Intake and Output:  Current Shift:  0701 -  1900  In: -   Out: 1100 [Urine:1100]  Last three shifts: No intake/output data recorded. Lab/Data Review:  Recent Labs     22   WBC 5.6   HGB 11.5   HCT 36.6        Recent Labs     22  2357 22  2040    144   K 3.4* 4.1   * 106   CO2 26 29   * 154*   BUN 27* 31*   CREA 1.99* 2.22*   CA 8.4* 8.3*   MG 2.1  --    ALB 3.2* 3.2*   TBILI 1.0 1.0   ALT 18 22   INR 1.2* 1.2*     No results for input(s): PH, PCO2, PO2, HCO3, FIO2 in the last 72 hours.           _____________________________________________________________________________  Time spent in direct care including coordination of service, review of data and examination: > 35 minutes    ______________________________________________________________________________    Dylan Servin NP    This is dictation was done by santo, computer voice recognition software. Quite often unanticipated grammatical, syntax, homophones and other interpretive errors or inadvertently transcribed by the computer software. Please excuse errors that have escaped final proofreading. Thank you.

## 2022-12-18 NOTE — ED TRIAGE NOTES
Patient transferred from [facility] for NSTEMI, A on C CHF and left breast mass. Here for admission.

## 2022-12-19 ENCOUNTER — APPOINTMENT (OUTPATIENT)
Dept: NON INVASIVE DIAGNOSTICS | Age: 72
End: 2022-12-19
Attending: INTERNAL MEDICINE
Payer: MEDICARE

## 2022-12-19 LAB
ANION GAP SERPL CALC-SCNC: 8 MMOL/L (ref 5–15)
ATRIAL RATE: 108 BPM
ATRIAL RATE: 82 BPM
BASOPHILS # BLD: 0 K/UL (ref 0–0.1)
BASOPHILS NFR BLD: 1 % (ref 0–1)
BUN SERPL-MCNC: 30 MG/DL (ref 6–20)
BUN/CREAT SERPL: 15 (ref 12–20)
CA-I BLD-MCNC: 9 MG/DL (ref 8.5–10.1)
CALCULATED P AXIS, ECG09: -63 DEGREES
CALCULATED P AXIS, ECG09: 51 DEGREES
CALCULATED R AXIS, ECG10: -114 DEGREES
CALCULATED R AXIS, ECG10: -123 DEGREES
CALCULATED T AXIS, ECG11: 60 DEGREES
CALCULATED T AXIS, ECG11: 67 DEGREES
CHLORIDE SERPL-SCNC: 109 MMOL/L (ref 97–108)
CO2 SERPL-SCNC: 27 MMOL/L (ref 21–32)
CREAT SERPL-MCNC: 1.97 MG/DL (ref 0.55–1.02)
DIAGNOSIS, 93000: NORMAL
DIAGNOSIS, 93000: NORMAL
DIFFERENTIAL METHOD BLD: ABNORMAL
EOSINOPHIL # BLD: 0 K/UL (ref 0–0.4)
EOSINOPHIL NFR BLD: 1 % (ref 0–7)
ERYTHROCYTE [DISTWIDTH] IN BLOOD BY AUTOMATED COUNT: 16.4 % (ref 11.5–14.5)
EST. AVERAGE GLUCOSE BLD GHB EST-MCNC: 117 MG/DL
GLUCOSE BLD STRIP.AUTO-MCNC: 106 MG/DL (ref 65–100)
GLUCOSE BLD STRIP.AUTO-MCNC: 112 MG/DL (ref 65–100)
GLUCOSE BLD STRIP.AUTO-MCNC: 119 MG/DL (ref 65–100)
GLUCOSE BLD STRIP.AUTO-MCNC: 154 MG/DL (ref 65–100)
GLUCOSE SERPL-MCNC: 120 MG/DL (ref 65–100)
HBA1C MFR BLD: 5.7 % (ref 4–5.6)
HCT VFR BLD AUTO: 34.7 % (ref 35–47)
HGB BLD-MCNC: 11 G/DL (ref 11.5–16)
IMM GRANULOCYTES # BLD AUTO: 0 K/UL (ref 0–0.04)
IMM GRANULOCYTES NFR BLD AUTO: 1 % (ref 0–0.5)
INR PPP: 1.2 (ref 0.9–1.1)
LYMPHOCYTES # BLD: 1.2 K/UL (ref 0.8–3.5)
LYMPHOCYTES NFR BLD: 29 % (ref 12–49)
MCH RBC QN AUTO: 28.6 PG (ref 26–34)
MCHC RBC AUTO-ENTMCNC: 31.7 G/DL (ref 30–36.5)
MCV RBC AUTO: 90.1 FL (ref 80–99)
MONOCYTES # BLD: 0.5 K/UL (ref 0–1)
MONOCYTES NFR BLD: 13 % (ref 5–13)
NEUTS SEG # BLD: 2.3 K/UL (ref 1.8–8)
NEUTS SEG NFR BLD: 55 % (ref 32–75)
NRBC # BLD: 0 K/UL (ref 0–0.01)
NRBC BLD-RTO: 0 PER 100 WBC
P-R INTERVAL, ECG05: 100 MS
PERFORMED BY, TECHID: ABNORMAL
PLATELET # BLD AUTO: 227 K/UL (ref 150–400)
PMV BLD AUTO: 10.7 FL (ref 8.9–12.9)
POTASSIUM SERPL-SCNC: 3.7 MMOL/L (ref 3.5–5.1)
PROTHROMBIN TIME: 15 SEC (ref 11.9–14.6)
Q-T INTERVAL, ECG07: 425 MS
Q-T INTERVAL, ECG07: 492 MS
QRS DURATION, ECG06: 154 MS
QRS DURATION, ECG06: 163 MS
QTC CALCULATION (BEZET), ECG08: 573 MS
QTC CALCULATION (BEZET), ECG08: 574 MS
RBC # BLD AUTO: 3.85 M/UL (ref 3.8–5.2)
SODIUM SERPL-SCNC: 144 MMOL/L (ref 136–145)
VENTRICULAR RATE, ECG03: 109 BPM
VENTRICULAR RATE, ECG03: 82 BPM
WBC # BLD AUTO: 4.1 K/UL (ref 3.6–11)

## 2022-12-19 PROCEDURE — 74011000250 HC RX REV CODE- 250: Performed by: INTERNAL MEDICINE

## 2022-12-19 PROCEDURE — 85025 COMPLETE CBC W/AUTO DIFF WBC: CPT

## 2022-12-19 PROCEDURE — 94761 N-INVAS EAR/PLS OXIMETRY MLT: CPT

## 2022-12-19 PROCEDURE — 85610 PROTHROMBIN TIME: CPT

## 2022-12-19 PROCEDURE — 93970 EXTREMITY STUDY: CPT

## 2022-12-19 PROCEDURE — 65270000029 HC RM PRIVATE

## 2022-12-19 PROCEDURE — 93005 ELECTROCARDIOGRAM TRACING: CPT

## 2022-12-19 PROCEDURE — 77010033678 HC OXYGEN DAILY

## 2022-12-19 PROCEDURE — 36415 COLL VENOUS BLD VENIPUNCTURE: CPT

## 2022-12-19 PROCEDURE — 74011250637 HC RX REV CODE- 250/637: Performed by: NURSE PRACTITIONER

## 2022-12-19 PROCEDURE — 74011250636 HC RX REV CODE- 250/636: Performed by: INTERNAL MEDICINE

## 2022-12-19 PROCEDURE — 74011250637 HC RX REV CODE- 250/637: Performed by: INTERNAL MEDICINE

## 2022-12-19 PROCEDURE — 80048 BASIC METABOLIC PNL TOTAL CA: CPT

## 2022-12-19 PROCEDURE — 82962 GLUCOSE BLOOD TEST: CPT

## 2022-12-19 RX ADMIN — BUMETANIDE 1 MG: 0.25 INJECTION, SOLUTION INTRAMUSCULAR; INTRAVENOUS at 21:26

## 2022-12-19 RX ADMIN — POTASSIUM CHLORIDE 40 MEQ: 750 TABLET, FILM COATED, EXTENDED RELEASE ORAL at 08:15

## 2022-12-19 RX ADMIN — METOPROLOL SUCCINATE 50 MG: 50 TABLET, EXTENDED RELEASE ORAL at 08:15

## 2022-12-19 RX ADMIN — SPIRONOLACTONE 25 MG: 25 TABLET ORAL at 08:15

## 2022-12-19 RX ADMIN — WHITE PETROLATUM,ZINC OXIDE: 20; 75 PASTE TOPICAL at 13:39

## 2022-12-19 RX ADMIN — SODIUM CHLORIDE, PRESERVATIVE FREE 10 ML: 5 INJECTION INTRAVENOUS at 14:59

## 2022-12-19 RX ADMIN — ATORVASTATIN CALCIUM 10 MG: 10 TABLET, FILM COATED ORAL at 21:26

## 2022-12-19 RX ADMIN — HYDRALAZINE HYDROCHLORIDE 10 MG: 10 TABLET, FILM COATED ORAL at 08:15

## 2022-12-19 RX ADMIN — BUMETANIDE 1 MG: 0.25 INJECTION, SOLUTION INTRAMUSCULAR; INTRAVENOUS at 08:15

## 2022-12-19 RX ADMIN — ENOXAPARIN SODIUM 30 MG: 100 INJECTION SUBCUTANEOUS at 08:14

## 2022-12-19 RX ADMIN — HYDRALAZINE HYDROCHLORIDE 10 MG: 10 TABLET, FILM COATED ORAL at 21:26

## 2022-12-19 RX ADMIN — SODIUM CHLORIDE, PRESERVATIVE FREE 10 ML: 5 INJECTION INTRAVENOUS at 06:39

## 2022-12-19 RX ADMIN — PANTOPRAZOLE SODIUM 40 MG: 40 TABLET, DELAYED RELEASE ORAL at 08:15

## 2022-12-19 RX ADMIN — LEVOTHYROXINE SODIUM 50 MCG: 0.03 TABLET ORAL at 06:38

## 2022-12-19 RX ADMIN — LISINOPRIL 10 MG: 10 TABLET ORAL at 08:15

## 2022-12-19 RX ADMIN — ASPIRIN 81 MG CHEWABLE TABLET 81 MG: 81 TABLET CHEWABLE at 08:15

## 2022-12-19 RX ADMIN — SODIUM CHLORIDE, PRESERVATIVE FREE 10 ML: 5 INJECTION INTRAVENOUS at 21:27

## 2022-12-19 RX ADMIN — AMIODARONE HYDROCHLORIDE 100 MG: 200 TABLET ORAL at 08:15

## 2022-12-19 NOTE — WOUND CARE
IP WOUND CONSULT    Cami Mclaughlin  MEDICAL RECORD NUMBER:  [id number]  AGE: 74 y.o. GENDER: female  : 1950  TODAY'S DATE:  2022  Places; hospital units: 5    GENERAL     [] Follow-up   [x] New Consult          PAST MEDICAL HISTORY    Past Medical History:   Diagnosis Date    CHF (congestive heart failure) ([address]     Diabetes ([address]     Hypertension     Menopause         ALLERGIES    Allergies   Allergen Reactions    Contrast Agent [Iodine] Nausea and Vomiting          Orientation: Hx of Dementia     Continent: incontinent  barrier: Pure Wick    Isidro 16    Nutritionist Consulted:   Nutrition Status:    Support Surface: Edwards gel mattress    Contributing Factors: anticoagulation therapy, edema, diabetes, decreased mobility, shear force, incontinence of stool, incontinence of urine, and obesity      Assessment     Patient resting in bed eating breakfast. Patient states that wound to right heel has been present for a while and that she sees a podiatrist for it. Patient is able to reposition self with assistance. Patient states that she had a blister present to right lower leg. Old dressing removed, no blister present at this time and skin is intact will leave area open to air. Diabetic wound to right heel, wound bed pink/red with some slough present. Rickie-wound is dry/flaky. Wound cleansed with NS, thin layer of therahoney to be applied and covered with foam dressing. Dressing to be changed every other day and PRN for soiling. Well healing PI noted to left buttocks, wound bed is pink and dry and rickie-wound is hypopigmented. Wound cleansed with bath wipe and a thin layer of zinc paste to be applied and covered with foam dressing. Dressing to be changed every other day and PRN for soiling    Encourage patient to reposition every 2 hours and offload heels on pillows or with offloading boots at all times while in bed/chair.      Wound Heel Right 22 (Active)   Wound Etiology Diabetic 12/19/22 1104   Dressing Status New dressing applied 12/19/22 1104   Cleansed Cleansed with saline 12/19/22 1104   Dressing/Treatment Honey gel/honey paste; Foam 12/19/22 1104   Wound Assessment Pink/red;Slough 12/19/22 1104   Drainage Amount Scant 12/19/22 1104   Drainage Description Serosanguinous 12/19/22 1104   Wound Odor None 12/19/22 1104   Maru-Wound/Incision Assessment Dry/flaky 12/19/22 1104   Edges Defined edges 12/19/22 1104   Number of days: 1       Wound Leg lower Right Superficial abrasion on the lower leg 12/18/22 (Active)   Number of days: 1       Wound Buttocks Left 12/18/22 (Active)   Wound Etiology Other (Comment) 12/19/22 1105   Dressing Status New dressing applied 12/19/22 1105   Cleansed Cleansed with saline 12/19/22 1105   Dressing/Treatment Zinc paste; Foam 12/19/22 1105   Wound Assessment Pink/red;Dry 12/19/22 1105   Drainage Amount None 12/19/22 1105   Wound Odor None 12/19/22 1105   Maru-Wound/Incision Assessment Intact 12/19/22 1105   Edges Flat/open edges 12/19/22 1105   Number of days: 1       Incision Proximal;Right (Active)   Number of days:          Skin Care & Pressure Relief Recommendations  Minimize layers of linen  Pads under patient to optimize support surface  Turn/reposition approximately every 2 hours  Pillow wedges  Manage incontinence   Promote continence; Skin Protective lotion/cream to buttocks and sacrum daily and as needed with incontinence care  Offload heels pillows  Offloading boots      Discharge Wound Care Needs:    Teaching completed with:   [] Patient           [] Family member       [] Caregiver          [] Nursing  [] Other    Patient/Caregiver Teaching:  Level of patient/caregiver understanding able to:   [] Indicates understanding       [] Needs reinforcement  [] Unsuccessful      [] Verbal Understanding  [] Demonstrated understanding       [] No evidence of learning  [] Refused teaching         [] N/A       Jarrell العراقي, RN, BSN  [facility] 12/19/22  11:06 AM

## 2022-12-19 NOTE — PROGRESS NOTES
Progress Note      12/19/2022 3:03 PM  NAME: Kassandra Hauser   MRN:  [id number]   Admit Diagnosis: CHF exacerbation ([facility] [address] [I50.9]          Assessment/Plan:   Decompensated CHF,Acute on Chronic, improving, continue diuresis, monitor electrolytes. Echo on this admission with ejection fraction of 25 to 30%, previously reported with improved LV function. Troponin leak, flat, EKG with paced rhythm, without acute changes. History of SVT, status post ablation, status post ICD, on amiodarone. Hypothyroidism, on levothyroxine, elevated TSH. ? Compliant with Synthroid    Chronic kidney disease, improved creatinine from before. Diabetes mellitus     Edema, skin breakdown, impaired mobility, elevated D-dimer, will check venous Dopplers. []       High complexity decision making was performed in this patient at high risk for decompensation with multiple organ involvement. Subjective:     Kassandra Hauser denies chest pain, dyspnea. Discussed with RN events overnight. Review of Systems:    Symptom Y/N Comments  Symptom Y/N Comments   Fever/Chills N   Chest Pain N    Poor Appetite N   Edema N    Cough N   Abdominal Pain N    Sputum N   Joint Pain N    SOB/MOSES N   Pruritis/Rash N    Nausea/vomit N   Tolerating PT/OT Y    Diarrhea N   Tolerating Diet Y    Constipation N   Other       Could NOT obtain due to:      Objective:      Physical Exam:    Last 24hrs VS reviewed since prior progress note.  Most recent are:    Visit Vitals  BP (!) 152/91 (BP 1 Location: Left upper arm, BP Patient Position: Semi fowlers)   Pulse 68   Temp 97.5 °F (36.4 °C)   Resp 20   Ht 5' 3\" (1.6 m)   Wt 100.5 kg (221 lb 9 oz)   SpO2 96%   Breastfeeding No   BMI 39.25 kg/m²       Intake/Output Summary (Last 24 hours) at 12/19/2022 1503  Last data filed at 12/19/2022 1150  Gross per 24 hour   Intake --   Output 600 ml   Net -600 ml        General Appearance: Well developed, well nourished, alert; no acute distress. Ears/Nose/Mouth/Throat: Hearing grossly normal; moist mucous membranes  Neck: Supple. Chest: Lungs clear to auscultation bilaterally. Cardiovascular: Regular rate and rhythm, S1S2 normal, no murmur. Abdomen: Soft, non-tender, bowel sounds are active. Extremities: 2+ edema bilaterally. Skin: Warm and dry. []         Post-cath site without hematoma, bruit, tenderness, or thrill. Distal pulses intact. PMH/ reviewed - no change compared to H&P    Data Review    Telemetry:     EKG:   []  No new EKG for review    Lab Data Personally Reviewed:    Recent Labs     12/19/22  0820 12/17/22 2040   WBC 4.1 5.6   HGB 11.0* 11.5   HCT 34.7* 36.6    235     Recent Labs     12/19/22  0820 12/17/22 2357 12/17/22 2040   INR 1.2* 1.2* 1.2*   PTP 15.0* 15.5* 14.5   APTT  --  29.8  --       Recent Labs     12/19/22  0820 12/17/22 2357 12/17/22 2040    144 144   K 3.7 3.4* 4.1   * 109* 106   CO2 27 26 29   BUN 30* 27* 31*   CREA 1.97* 1.99* 2.22*   * 127* 154*   CA 9.0 8.4* 8.3*   MG  --  2.1  --      No results for input(s): CPK, CKNDX, TROIQ in the last 72 hours. No lab exists for component: CPKMB  No results found for: CHOL, CHOLX, CHLST, CHOLV, HDL, HDLP, LDL, LDLC, DLDLP, TGLX, TRIGL, TRIGP, CHHD, CHHDX    Recent Labs     12/17/22 2357 12/17/22 2040   * 136*   TP 6.6 7.3   ALB 3.2* 3.2*   GLOB 3.4 4.1*     No results for input(s): PH, PCO2, PO2 in the last 72 hours.     Medications Personally Reviewed:    Current Facility-Administered Medications   Medication Dose Route Frequency    zinc oxide-white petrolatum 20-75 % topical paste   Topical EVERY OTHER DAY    bumetanide (BUMEX) injection 1 mg  1 mg IntraVENous BID    amiodarone (CORDARONE) tablet 100 mg  100 mg Oral DAILY    hydrALAZINE (APRESOLINE) tablet 10 mg  10 mg Oral BID    levothyroxine (SYNTHROID) tablet 50 mcg  50 mcg Oral 6am    metoprolol succinate (TOPROL-XL) XL tablet 50 mg  50 mg Oral DAILY pantoprazole (PROTONIX) tablet 40 mg  40 mg Oral DAILY    atorvastatin (LIPITOR) tablet 10 mg  10 mg Oral QHS    spironolactone (ALDACTONE) tablet 25 mg  25 mg Oral DAILY    sodium chloride (NS) flush 5-40 mL  5-40 mL IntraVENous Q8H    sodium chloride (NS) flush 5-40 mL  5-40 mL IntraVENous PRN    acetaminophen (TYLENOL) tablet 650 mg  650 mg Oral Q6H PRN    Or    acetaminophen (TYLENOL) suppository 650 mg  650 mg Rectal Q6H PRN    polyethylene glycol (MIRALAX) packet 17 g  17 g Oral DAILY PRN    ondansetron (ZOFRAN ODT) tablet 4 mg  4 mg Oral Q8H PRN    Or    ondansetron (ZOFRAN) injection 4 mg  4 mg IntraVENous Q6H PRN    enoxaparin (LOVENOX) injection 30 mg  30 mg SubCUTAneous DAILY    insulin lispro (HUMALOG) injection   SubCUTAneous TIDAC    glucose chewable tablet 16 g  4 Tablet Oral PRN    glucagon (GLUCAGEN) injection 1 mg  1 mg IntraMUSCular PRN    dextrose 10% infusion 0-250 mL  0-250 mL IntraVENous PRN    lisinopriL (PRINIVIL, ZESTRIL) tablet 10 mg  10 mg Oral DAILY    aspirin chewable tablet 81 mg  81 mg Oral DAILY    potassium chloride SR (KLOR-CON 10) tablet 40 mEq  40 mEq Oral DAILY         Antonia Lopez MD unsure

## 2022-12-19 NOTE — PROGRESS NOTES
Chart reviewed, DCP remains for patient to d/c from CM to home self care once medically stable, however CM awaiting PT/OT evals to determine further CM DC needs.

## 2022-12-19 NOTE — DISCHARGE INSTRUCTIONS
Follow-up with cardiology within 3-5 days of discharge.  ___________________________________    _______________________________________      Purvi Fuchssom the Heart Failure [place] Annie: Search in your Matchbook (Android) or Blood cell Storage Store ([employer]): Search for- HF [place] Annie.    Ushi    HF [place] is a brand-new phone annie that helps you track daily symptoms, vitals, mood, energy level and more. You can even add your heart failure care team members to view your data and monitor your condition at home.     HF [place] lets you:  Track symptoms, medications and more  Share health information with your health care team  Connect with others living with heart failure  __________________________________________

## 2022-12-19 NOTE — NURSE NAVIGATOR
Heart Failure Nurse Navigator: Heart Failure Education    RN performs hand hygiene. RN Introduces herself to the patient and informs the patient of the reasoning for the visit. Patient Verbalizes Understanding for visit, is accepting of discussion    Persons present for Education: Patient    Time Spent: At least 60minutes    Method of teaching:  Teach-back, demonstration, verbal, visual    Education Packets Given: Living with Heart Failure book, Heart Failure symptom management calendar/tracker, Heart Failure Self Check plan, Heart Failure: Partnering Your Treatment Questionnaire, Sodium tracker, and Eat Smart with Nutrition labels.    -Confirmation of working scales & that the patient can read numbers  -Confirmation of support to assist with daily weights if patient unable to perform independently. RN-NN provided education on Daily weights to include same time daily, on same scale, and documenting weights on calendar. RN-NN provided education trigger management/ signs and symptoms of Heart Failure. Patient is advised on Yellow Days to call MD office and on Red Days to come to the ER or call 911. RN provides Nutritional education that includes how to calculate and restrict sodium and fluid intake. Encouraged fresh vegetable choice over canned/processed goods. Demonstrated how to log meals/intake. RN discusses lifestyle changes including cessation of smoking and increasing activity level. In addition, RN discusses the importance of keeping/scheduling appointments and adherence to guideline directed medical therapies. Patient was able to teach back to the RN-NN the above information. Patient will need reinforcement of education provided. Patient advised about the HF helper Annie and Javy Armendariz.

## 2022-12-19 NOTE — PROGRESS NOTES
Hospitalist Progress Note            Daily Progress Note: 12/19/2022 9:31 AM  Hospital course:     Maryse Fernando is a 74 y.o. female with PMH of CHF with pacer, SVT AICD, NICM, diabetes, and hypertension presented to outside ED with chief complaint of shortness of breath for the past week. Unfortunately poor historian and unable to provide detailed history. She reports dyspnea with exertion and LE edema, but denies orthopnea. On admission to the ED denies any chest pain for the past few weeks. From outside ED, BNP > 18976, troponin 131. Chest X-ray showed pulmonary edema. Admitted for further management of CHF exacerbation, non-STEMI, elevated troponins, elevated BNP, shortness of breath and tachycardia. Cardiology consulted. Started on IV Bumex. Monitoring electrolytes and renal function closely for replacement while on diuretics. 2D echo to assess heart function revealing EF 25 to 30% with moderate global hypokinesis. Subjective: Follow-up examination of patient at bedside. She is pleasant and alert. She states improvement in edema. Assessment/Plan:   Active Problems:    CHF exacerbation ([address] (12/18/2022)    # CHF exacerbation  #History of cardiomyopathy  - IV Bumex  - ordered ECHO revealing EF 25 to 30% with moderate global hypokinesis  - Monitor I/O  - Continue home medications. PO metoprolol and aldactone.   -Cardiology following ordered venous Doppler study- Low sodium diet. - Suspect contributing to elevated troponin. CHOCO 4, however no chest pain. Hold off IV heparin ggt. # Elevated troponin  - suspect secondary to above  - Down-trending. Peaked at 140. # Hypertension   - Continue home medications. PO hydralazine, spironolactone, metoprolol. #History of supraventricular tachycardia  - Paced rhythm on my evaluation.   - Continue PO amiodarone and warfarin. # Diabetes  - POC + correctional insulin  - Check HbA1c.       # CKD stage IV (eGRR 15-29)  - creatinine stable. Continue to monitor on diuretics. # Hypothyroidism  - Continue home medications. PO levothyroxine. DVT Prophylaxis: Lovenox  Code Status: Full Code  POA/NOK:    Disposition and discharge barriers:   Cardiology consult pending  IV diuresing  Ischemic work-up  Care Plan discussed with: Patient, staff, IDR team    Current Facility-Administered Medications   Medication Dose Route Frequency    bumetanide (BUMEX) injection 1 mg  1 mg IntraVENous BID    amiodarone (CORDARONE) tablet 100 mg  100 mg Oral DAILY    hydrALAZINE (APRESOLINE) tablet 10 mg  10 mg Oral BID    levothyroxine (SYNTHROID) tablet 50 mcg  50 mcg Oral 6am    metoprolol succinate (TOPROL-XL) XL tablet 50 mg  50 mg Oral DAILY    pantoprazole (PROTONIX) tablet 40 mg  40 mg Oral DAILY    atorvastatin (LIPITOR) tablet 10 mg  10 mg Oral QHS    spironolactone (ALDACTONE) tablet 25 mg  25 mg Oral DAILY    sodium chloride (NS) flush 5-40 mL  5-40 mL IntraVENous Q8H    sodium chloride (NS) flush 5-40 mL  5-40 mL IntraVENous PRN    acetaminophen (TYLENOL) tablet 650 mg  650 mg Oral Q6H PRN    Or    acetaminophen (TYLENOL) suppository 650 mg  650 mg Rectal Q6H PRN    polyethylene glycol (MIRALAX) packet 17 g  17 g Oral DAILY PRN    ondansetron (ZOFRAN ODT) tablet 4 mg  4 mg Oral Q8H PRN    Or    ondansetron (ZOFRAN) injection 4 mg  4 mg IntraVENous Q6H PRN    enoxaparin (LOVENOX) injection 30 mg  30 mg SubCUTAneous DAILY    insulin lispro (HUMALOG) injection   SubCUTAneous TIDAC    glucose chewable tablet 16 g  4 Tablet Oral PRN    glucagon (GLUCAGEN) injection 1 mg  1 mg IntraMUSCular PRN    dextrose 10% infusion 0-250 mL  0-250 mL IntraVENous PRN    lisinopriL (PRINIVIL, ZESTRIL) tablet 10 mg  10 mg Oral DAILY    aspirin chewable tablet 81 mg  81 mg Oral DAILY    potassium chloride SR (KLOR-CON 10) tablet 40 mEq  40 mEq Oral DAILY        REVIEW OF SYSTEMS    Review of Systems   Constitutional:  Positive for malaise/fatigue.    HENT:  Positive for congestion. Respiratory:  Positive for shortness of breath. Cardiovascular:  Positive for leg swelling. Negative for chest pain. Musculoskeletal:  Positive for myalgias. Neurological:  Positive for weakness. Psychiatric/Behavioral:  The patient is nervous/anxious. Objective:     Visit Vitals  BP (!) 145/93 (BP 1 Location: Left upper arm, BP Patient Position: Semi fowlers)   Pulse 76   Temp 97.4 °F (36.3 °C)   Resp 20   Ht 5' 3\" (1.6 m)   Wt 100.5 kg (221 lb 9 oz)   SpO2 96%   Breastfeeding No   BMI 39.25 kg/m²    O2 Flow Rate (L/min): 1 l/min O2 Device: Nasal cannula    Temp (24hrs), Av.5 °F (36.4 °C), Min:97.4 °F (36.3 °C), Max:97.7 °F (36.5 °C)        PHYSICAL EXAM:    Physical Exam  Constitutional:       Appearance: She is obese. She is ill-appearing. HENT:      Nose: Congestion present. Cardiovascular:      Rate and Rhythm: Regular rhythm. Tachycardia present. Pulmonary:      Effort: Respiratory distress present. Abdominal:      General: There is distension. Tenderness: There is no abdominal tenderness. Musculoskeletal:      Right lower leg: Edema present. Left lower leg: Edema present. Skin:     General: Skin is warm. Neurological:      Motor: Weakness present. Data Review        No orders to display       Intake and Output:  Current Shift: No intake/output data recorded.   Last three shifts:  1901 -  0700  In: -   Out: 1100 [Urine:1100]      Lab/Data Review:  Recent Labs     22  0820 22  2040   WBC 4.1 5.6   HGB 11.0* 11.5   HCT 34.7* 36.6    235       Recent Labs     22  0820 22  2357 22  2040    144 144   K 3.7 3.4* 4.1   * 109* 106   CO2 27 26 29   * 127* 154*   BUN 30* 27* 31*   CREA 1.97* 1.99* 2.22*   CA 9.0 8.4* 8.3*   MG  --  2.1  --    ALB  --  3.2* 3.2*   TBILI  --  1.0 1.0   ALT  --  18 22   INR 1.2* 1.2* 1.2*       No results for input(s): PH, PCO2, PO2, HCO3, FIO2 in the last 72 hours.          _____________________________________________________________________________  Time spent in direct care including coordination of service, review of data and examination: > 35 minutes    ______________________________________________________________________________    Marcy Rao NP    This is dictation was done by [employer], [email or web address] voice recognition software. Quite often unanticipated grammatical, syntax, homophones and other interpretive errors or inadvertently transcribed by the computer software. Please excuse errors that have escaped final proofreading. Thank you.

## 2022-12-20 LAB
ANION GAP SERPL CALC-SCNC: 5 MMOL/L (ref 5–15)
BASOPHILS # BLD: 0 K/UL (ref 0–0.1)
BASOPHILS NFR BLD: 1 % (ref 0–1)
BUN SERPL-MCNC: 30 MG/DL (ref 6–20)
BUN/CREAT SERPL: 14 (ref 12–20)
CA-I BLD-MCNC: 8.6 MG/DL (ref 8.5–10.1)
CHLORIDE SERPL-SCNC: 109 MMOL/L (ref 97–108)
CO2 SERPL-SCNC: 28 MMOL/L (ref 21–32)
CREAT SERPL-MCNC: 2.12 MG/DL (ref 0.55–1.02)
DIFFERENTIAL METHOD BLD: ABNORMAL
EOSINOPHIL # BLD: 0 K/UL (ref 0–0.4)
EOSINOPHIL NFR BLD: 0 % (ref 0–7)
ERYTHROCYTE [DISTWIDTH] IN BLOOD BY AUTOMATED COUNT: 16 % (ref 11.5–14.5)
GLUCOSE BLD STRIP.AUTO-MCNC: 103 MG/DL (ref 65–100)
GLUCOSE BLD STRIP.AUTO-MCNC: 116 MG/DL (ref 65–100)
GLUCOSE BLD STRIP.AUTO-MCNC: 125 MG/DL (ref 65–100)
GLUCOSE BLD STRIP.AUTO-MCNC: 178 MG/DL (ref 65–100)
GLUCOSE SERPL-MCNC: 125 MG/DL (ref 65–100)
HCT VFR BLD AUTO: 35.3 % (ref 35–47)
HGB BLD-MCNC: 11.2 G/DL (ref 11.5–16)
IMM GRANULOCYTES # BLD AUTO: 0 K/UL (ref 0–0.04)
IMM GRANULOCYTES NFR BLD AUTO: 0 % (ref 0–0.5)
INR PPP: 1.2 (ref 0.9–1.1)
LYMPHOCYTES # BLD: 1.2 K/UL (ref 0.8–3.5)
LYMPHOCYTES NFR BLD: 33 % (ref 12–49)
MCH RBC QN AUTO: 28.3 PG (ref 26–34)
MCHC RBC AUTO-ENTMCNC: 31.7 G/DL (ref 30–36.5)
MCV RBC AUTO: 89.1 FL (ref 80–99)
MONOCYTES # BLD: 0.6 K/UL (ref 0–1)
MONOCYTES NFR BLD: 17 % (ref 5–13)
NEUTS SEG # BLD: 1.8 K/UL (ref 1.8–8)
NEUTS SEG NFR BLD: 49 % (ref 32–75)
NRBC # BLD: 0.03 K/UL (ref 0–0.01)
NRBC BLD-RTO: 0.8 PER 100 WBC
PERFORMED BY, TECHID: ABNORMAL
PLATELET # BLD AUTO: 230 K/UL (ref 150–400)
PMV BLD AUTO: 11 FL (ref 8.9–12.9)
POTASSIUM SERPL-SCNC: 4.3 MMOL/L (ref 3.5–5.1)
PROTHROMBIN TIME: 15.6 SEC (ref 11.9–14.6)
RBC # BLD AUTO: 3.96 M/UL (ref 3.8–5.2)
SODIUM SERPL-SCNC: 142 MMOL/L (ref 136–145)
WBC # BLD AUTO: 3.7 K/UL (ref 3.6–11)

## 2022-12-20 PROCEDURE — 74011000250 HC RX REV CODE- 250: Performed by: INTERNAL MEDICINE

## 2022-12-20 PROCEDURE — 65270000029 HC RM PRIVATE

## 2022-12-20 PROCEDURE — 82962 GLUCOSE BLOOD TEST: CPT

## 2022-12-20 PROCEDURE — 85610 PROTHROMBIN TIME: CPT

## 2022-12-20 PROCEDURE — 36415 COLL VENOUS BLD VENIPUNCTURE: CPT

## 2022-12-20 PROCEDURE — 97165 OT EVAL LOW COMPLEX 30 MIN: CPT

## 2022-12-20 PROCEDURE — 85025 COMPLETE CBC W/AUTO DIFF WBC: CPT

## 2022-12-20 PROCEDURE — 74011250637 HC RX REV CODE- 250/637: Performed by: NURSE PRACTITIONER

## 2022-12-20 PROCEDURE — 97161 PT EVAL LOW COMPLEX 20 MIN: CPT

## 2022-12-20 PROCEDURE — 97530 THERAPEUTIC ACTIVITIES: CPT

## 2022-12-20 PROCEDURE — 74011250636 HC RX REV CODE- 250/636: Performed by: INTERNAL MEDICINE

## 2022-12-20 PROCEDURE — 80048 BASIC METABOLIC PNL TOTAL CA: CPT

## 2022-12-20 PROCEDURE — 74011636637 HC RX REV CODE- 636/637: Performed by: INTERNAL MEDICINE

## 2022-12-20 PROCEDURE — 74011250637 HC RX REV CODE- 250/637: Performed by: INTERNAL MEDICINE

## 2022-12-20 RX ADMIN — INSULIN LISPRO 1 UNITS: 100 INJECTION, SOLUTION INTRAVENOUS; SUBCUTANEOUS at 13:52

## 2022-12-20 RX ADMIN — ASPIRIN 81 MG CHEWABLE TABLET 81 MG: 81 TABLET CHEWABLE at 09:27

## 2022-12-20 RX ADMIN — BUMETANIDE 1 MG: 0.25 INJECTION, SOLUTION INTRAMUSCULAR; INTRAVENOUS at 21:45

## 2022-12-20 RX ADMIN — HYDRALAZINE HYDROCHLORIDE 10 MG: 10 TABLET, FILM COATED ORAL at 09:27

## 2022-12-20 RX ADMIN — LISINOPRIL 10 MG: 10 TABLET ORAL at 09:27

## 2022-12-20 RX ADMIN — AMIODARONE HYDROCHLORIDE 100 MG: 200 TABLET ORAL at 09:27

## 2022-12-20 RX ADMIN — ATORVASTATIN CALCIUM 10 MG: 10 TABLET, FILM COATED ORAL at 21:45

## 2022-12-20 RX ADMIN — BUMETANIDE 1 MG: 0.25 INJECTION, SOLUTION INTRAMUSCULAR; INTRAVENOUS at 09:27

## 2022-12-20 RX ADMIN — SODIUM CHLORIDE, PRESERVATIVE FREE 10 ML: 5 INJECTION INTRAVENOUS at 13:56

## 2022-12-20 RX ADMIN — LEVOTHYROXINE SODIUM 50 MCG: 0.03 TABLET ORAL at 06:35

## 2022-12-20 RX ADMIN — SODIUM CHLORIDE, PRESERVATIVE FREE 10 ML: 5 INJECTION INTRAVENOUS at 06:37

## 2022-12-20 RX ADMIN — POTASSIUM CHLORIDE 40 MEQ: 750 TABLET, FILM COATED, EXTENDED RELEASE ORAL at 09:26

## 2022-12-20 RX ADMIN — PANTOPRAZOLE SODIUM 40 MG: 40 TABLET, DELAYED RELEASE ORAL at 09:27

## 2022-12-20 RX ADMIN — SPIRONOLACTONE 25 MG: 25 TABLET ORAL at 09:27

## 2022-12-20 RX ADMIN — ENOXAPARIN SODIUM 30 MG: 100 INJECTION SUBCUTANEOUS at 09:27

## 2022-12-20 RX ADMIN — SODIUM CHLORIDE, PRESERVATIVE FREE 10 ML: 5 INJECTION INTRAVENOUS at 21:45

## 2022-12-20 RX ADMIN — METOPROLOL SUCCINATE 50 MG: 50 TABLET, EXTENDED RELEASE ORAL at 09:27

## 2022-12-20 RX ADMIN — HYDRALAZINE HYDROCHLORIDE 10 MG: 10 TABLET, FILM COATED ORAL at 21:45

## 2022-12-20 NOTE — PROGRESS NOTES
Hospitalist Progress Note            Daily Progress Note: 12/20/2022 9:31 AM  Hospital course:     Kori Kirkland is a 74 y.o. female with PMH of CHF with pacer, SVT AICD, NICM, diabetes, and hypertension presented to outside ED with chief complaint of shortness of breath for the past week. Unfortunately poor historian and unable to provide detailed history. She reports dyspnea with exertion and LE edema, but denies orthopnea. On admission to the ED denies any chest pain for the past few weeks. From outside ED, BNP > 19638, troponin 131. Chest X-ray showed pulmonary edema. Admitted for further management of CHF exacerbation, non-STEMI, elevated troponins, elevated BNP, shortness of breath and tachycardia. Cardiology consulted. Started on IV Bumex. Monitoring electrolytes and renal function closely for replacement while on diuretics. 2D echo to assess heart function revealing EF 25 to 30% with moderate global hypokinesis. Lower extremity venous Doppler studies were negative for acute findings. PT and OT consulted to evaluate the patient for disposition needs and poor mobility. Case management is working on SNF placement at [facility] in [place]. Subjective: Follow-up examination of patient at bedside. She is pleasant and alert. She states improvement in edema. Assessment/Plan:   Active Problems:    CHF exacerbation ([address] (12/18/2022)    # CHF exacerbation  #History of cardiomyopathy  - IV Bumex  - ordered ECHO revealing EF 25 to 30% with moderate global hypokinesis  - Monitor I/O  - Continue home medications. PO metoprolol and aldactone.   -Cardiology following ordered venous Doppler study- Low sodium diet. - Suspect contributing to elevated troponin. CHOCO 4, however no chest pain. Hold off IV heparin ggt. # Elevated troponin  - suspect secondary to above  - Down-trending. Peaked at 140. # Hypertension   - Continue home medications.   PO hydralazine, spironolactone, metoprolol. #History of supraventricular tachycardia  - Paced rhythm on my evaluation.   - Continue PO amiodarone and warfarin. # Diabetes  - POC + correctional insulin  - Check HbA1c. # CKD stage IV (eGRR 15-29)  - creatinine stable. Continue to monitor on diuretics. # Hypothyroidism  - Continue home medications. PO levothyroxine.        DVT Prophylaxis: Lovenox  Code Status: Full Code  POA/NOK:    Disposition and discharge barriers:   Cardiology consult following  IV diuresing  SNF placement pending    Care Plan discussed with: Patient, staff, IDR team        Current Facility-Administered Medications   Medication Dose Route Frequency    zinc oxide-white petrolatum 20-75 % topical paste   Topical EVERY OTHER DAY    bumetanide (BUMEX) injection 1 mg  1 mg IntraVENous BID    amiodarone (CORDARONE) tablet 100 mg  100 mg Oral DAILY    hydrALAZINE (APRESOLINE) tablet 10 mg  10 mg Oral BID    levothyroxine (SYNTHROID) tablet 50 mcg  50 mcg Oral 6am    metoprolol succinate (TOPROL-XL) XL tablet 50 mg  50 mg Oral DAILY    pantoprazole (PROTONIX) tablet 40 mg  40 mg Oral DAILY    atorvastatin (LIPITOR) tablet 10 mg  10 mg Oral QHS    spironolactone (ALDACTONE) tablet 25 mg  25 mg Oral DAILY    sodium chloride (NS) flush 5-40 mL  5-40 mL IntraVENous Q8H    sodium chloride (NS) flush 5-40 mL  5-40 mL IntraVENous PRN    acetaminophen (TYLENOL) tablet 650 mg  650 mg Oral Q6H PRN    Or    acetaminophen (TYLENOL) suppository 650 mg  650 mg Rectal Q6H PRN    polyethylene glycol (MIRALAX) packet 17 g  17 g Oral DAILY PRN    ondansetron (ZOFRAN ODT) tablet 4 mg  4 mg Oral Q8H PRN    Or    ondansetron (ZOFRAN) injection 4 mg  4 mg IntraVENous Q6H PRN    enoxaparin (LOVENOX) injection 30 mg  30 mg SubCUTAneous DAILY    insulin lispro (HUMALOG) injection   SubCUTAneous TIDAC    glucose chewable tablet 16 g  4 Tablet Oral PRN    glucagon (GLUCAGEN) injection 1 mg  1 mg IntraMUSCular PRN    dextrose 10% infusion 0-250 mL  0-250 mL IntraVENous PRN    lisinopriL (PRINIVIL, ZESTRIL) tablet 10 mg  10 mg Oral DAILY    aspirin chewable tablet 81 mg  81 mg Oral DAILY    potassium chloride SR (KLOR-CON 10) tablet 40 mEq  40 mEq Oral DAILY        REVIEW OF SYSTEMS    Review of Systems   Constitutional:  Positive for malaise/fatigue. HENT:  Positive for congestion. Respiratory:  Positive for shortness of breath. Cardiovascular:  Positive for leg swelling. Negative for chest pain. Musculoskeletal:  Positive for myalgias. Neurological:  Positive for weakness. Psychiatric/Behavioral:  The patient is nervous/anxious. Objective:     Visit Vitals  BP (!) 141/78 (BP Patient Position: At rest;Semi fowlers)   Pulse 83   Temp 97.6 °F (36.4 °C)   Resp 20   Ht 5' 3\" (1.6 m)   Wt 100.6 kg (221 lb 12.5 oz)   SpO2 98%   Breastfeeding No   BMI 39.29 kg/m²    O2 Flow Rate (L/min): 1 l/min O2 Device: Nasal cannula    Temp (24hrs), Av.7 °F (36.5 °C), Min:97.4 °F (36.3 °C), Max:98.1 °F (36.7 °C)        PHYSICAL EXAM:    Physical Exam  Constitutional:       Appearance: She is obese. She is ill-appearing. HENT:      Nose: Congestion present. Cardiovascular:      Rate and Rhythm: Regular rhythm. Tachycardia present. Pulmonary:      Effort: Respiratory distress present. Abdominal:      General: There is distension. Tenderness: There is no abdominal tenderness. Musculoskeletal:      Right lower leg: Edema present. Left lower leg: Edema present. Skin:     General: Skin is warm. Neurological:      Motor: Weakness present. Data Review        DUPLEX LOWER EXT VENOUS BILAT   Final Result          Intake and Output:  Current Shift: No intake/output data recorded.   Last three shifts:  1901 -  0700  In: -   Out: 1200 [Urine:1200]      Lab/Data Review:  Recent Labs     22  0717 22  0820 22  2040   WBC 3.7 4.1 5.6   HGB 11.2* 11.0* 11.5   HCT 35.3 34.7* 36.6    227 235       Recent Labs     12/20/22  0717 12/19/22  0820 12/17/22  2357 12/17/22  2040    144 144 144   K 4.3 3.7 3.4* 4.1   * 109* 109* 106   CO2 28 27 26 29   * 120* 127* 154*   BUN 30* 30* 27* 31*   CREA 2.12* 1.97* 1.99* 2.22*   CA 8.6 9.0 8.4* 8.3*   MG  --   --  2.1  --    ALB  --   --  3.2* 3.2*   TBILI  --   --  1.0 1.0   ALT  --   --  18 22   INR 1.2* 1.2* 1.2* 1.2*       No results for input(s): PH, PCO2, PO2, HCO3, FIO2 in the last 72 hours. _____________________________________________________________________________  Time spent in direct care including coordination of service, review of data and examination: > 35 minutes    ______________________________________________________________________________    Drea Roberto NP    This is dictation was done by [employer], computer voice recognition software. Quite often unanticipated grammatical, syntax, homophones and other interpretive errors or inadvertently transcribed by the computer software. Please excuse errors that have escaped final proofreading. Thank you.

## 2022-12-20 NOTE — PROGRESS NOTES
OCCUPATIONAL THERAPY EVALUATION  Patient: Eric Bhakta (58 y.o. female)  Date: 12/20/2022  Primary Diagnosis: CHF exacerbation ([facility] [address] [I50.9]       Precautions: Ax2 away from bed, fall risk       ASSESSMENT  Pt is a 74 y.o. female presenting to [facility] with c/o chest pain, admitted 12/17/22 and currently being treated for CHF exacerbation, elevated troponin, HTN, atrial fibrillation, DM, CKD stage IV, and hypothyroidism. See below for home and PLOF information. Pt received semi-supine in bed upon arrival, AXO x person, place and month with cuing, and disoriented to year. Pt agreeable to OT evaluation. Pt's daughter entered room half way through session and stayed with pt approval.    Based on current observations, pt presents with deficits in generalized strength/AROM, bed mobility, static/dynamic sitting balance, static/dynamic standing balance (see PT note for gait details), functional activity tolerance, cognition/confusion, and decreased safety awareness currently impacting overall performance of ADLs and functional transfers/mobility (see below for objective details and assist levels). Overall, pt tolerates session fair with pt completing bed mobility, sit<>stand transfers, and taking side steps at EOB with CGA-max A overall. After sit>stand with max A, pt able to take side steps at EOB with mod A. Face washing and tooth brushing completed seated EOB with set up A to provide pt the items. Pt attempted to doff/don socks, however, anticipate pt will req total A for LB dressing as pt unable to reach feet. Pt scooted toward HOB with Ax2 from ns. Pt would benefit from continued skilled OT services to address current impairments and improve IND and safety with self cares and functional transfers/mobility. Current OT d/c recommendation Elliot Vance once medically appropriate.     Other factors to consider for discharge: family/social support, DME, time since onset, severity of deficits, functional baseline Patient will benefit from skilled therapy intervention to address the above noted impairments. PLAN :  Recommendations and Planned Interventions: self care training, functional mobility training, therapeutic exercise, balance training, therapeutic activities, cognitive retraining, endurance activities, patient education, home safety training, and family training/education    Recommend with staff: Encourage HEP in prep for ADLs/mobility and Frequent repositioning to prevent skin breakdown    Recommend next session: LB dressing  and Standing grooming    Frequency/Duration: Patient will be followed by occupational therapy:  3-5x/week to address goals. Recommendation for discharge: (in order for the patient to meet his/her long term goals)  Elliot Vance    This discharge recommendation:  Has been made in collaboration with the attending provider and/or case management    IF patient discharges home will need the following DME: AE: long handled bathing and AE: long handled dressing       SUBJECTIVE:   Patient stated I feel okay.     OBJECTIVE DATA SUMMARY:   HISTORY:   Past Medical History:   Diagnosis Date    CHF (congestive heart failure) ([facility] [address]     Diabetes ([facility] [address]     Hypertension     Menopause      Past Surgical History:   Procedure Laterality Date    HX IMPLANTABLE CARDIOVERTER DEFIBRILLATOR         Per pt:   Home Situation  Home Environment: Private residence  # Steps to Enter: 3  Rails to Enter: Yes  Hand Rails : Bilateral  One/Two Story Residence: One story  Living Alone: Yes  Support Systems: Child(christopher), Spouse/Significant Other  Patient Expects to be Discharged to[de-identified] Rehab Unit Subacute  Current DME Used/Available at Home: kin Abreu Albino Hawks, rollator, Shower chair  Tub or Shower Type: Tub/Shower combination    Hand dominance: Right    EXAMINATION OF PERFORMANCE DEFICITS:  Cognitive/Behavioral Status:  Neurologic State: Alert  Orientation Level: Oriented to person;Oriented to place; Disoriented to time (Oriented to placeand month with cuing, disoriented to year stating \"1922\")  Cognition: Follows commands    Hearing: Auditory  Auditory Impairment: None    Range of Motion:  AROM: Generally decreased, functional    Strength:  Strength: Generally decreased, functional    Coordination:  Coordination: Within functional limits  Fine Motor Skills-Upper: Left Intact; Right Intact    Gross Motor Skills-Upper: Left Intact; Right Intact    Balance:  Sitting: Impaired  Sitting - Static: Good (unsupported)  Sitting - Dynamic: Fair (occasional)  Standing: Impaired; With support  Standing - Static: Poor;Constant support  Standing - Dynamic : Poor;Constant support    Functional Mobility and Transfers for ADLs:  Bed Mobility:  Rolling: Maximum assistance  Supine to Sit: Maximum assistance  Sit to Supine: Moderate assistance (Guiding BLE)  Scooting: Contact guard assistance;Minimum assistance (Toward EOB)    Transfers:  Sit to Stand: Maximum assistance  Stand to Sit: Moderate assistance  Bed to Chair: Moderate assistance (Simulated with side steps)      ADL Intervention and task modifications:  Grooming  Position Performed: Seated edge of bed  Washing Face: Set-up (Handing pt wet wash cloth)  Brushing Teeth: Set-up (Providing tooth brush, paste, and cups to pt)    Lower Body Dressing Assistance  Socks: Total assistance (dependent)  Position Performed: Long sitting on bed    Therapeutic Exercise:  Pt would benefit from UE HEP in prep for ADLs and functional mobility/transfers. Functional Measure:    [employer] AM-PACTM \"6 Clicks\"                                                       Daily Activity Inpatient Short Form  How much help from another person does the patient currently need. .. Total; A Lot A Little None   1. Putting on and taking off regular lower body clothing? [x]  1 []  2 []  3 []  4   2. Bathing (including washing, rinsing, drying)? [x]  1 []  2 []  3 []  4   3.   Toileting, which includes using toilet, bedpan or urinal? [x] 1 []  2 []  3 []  4   4. Putting on and taking off regular upper body clothing? []  1 []  2 [x]  3 []  4   5. Taking care of personal grooming such as brushing teeth? []  1 []  2 []  3 [x]  4   6. Eating meals? []  1 []  2 []  3 [x]  4   © 2007, Trustees of [facility], under license to [employer]. All rights reserved     Score: 14/24     Interpretation of Tool:  Represents clinically-significant functional categories (i.e. Activities of daily living). Percentage of Impairment CH    0%   CI    1-19% CJ    20-39% CK    40-59% CL    60-79% CM    80-99% CN     100%   [facility]  Score 6-24 24 23 20-22 15-19 10-14 7-9 6     Occupational Therapy Evaluation Charge Determination   History Examination Decision-Making   LOW Complexity : Brief history review  MEDIUM Complexity : 3-5 performance deficits relating to physical, cognitive , or psychosocial skils that result in activity limitations and / or participation restrictions MEDIUM Complexity : Patient may present with comorbidities that affect occupational performnce. Miniml to moderate modification of tasks or assistance (eg, physical or verbal ) with assesment(s) is necessary to enable patient to complete evaluation       Based on the above components, the patient evaluation is determined to be of the following complexity level: LOW     Pain Rating:  Pt did not report    Activity Tolerance:   Fair and requires rest breaks    After treatment patient left in no apparent distress:    Supine in bed, Call bell within reach, Bed / chair alarm activated, Caregiver / family present, and Side rails x 3, bed locked and in lowest position    COMMUNICATION/EDUCATION:   The patients plan of care was discussed with: Physical therapist.     Patient/family agree to work toward stated goals and plan of care. This patients plan of care is appropriate for delegation to hospitals.      Thank you for this referral.  Kym Pryor, OT  Time Calculation: 37 mins    Problem: Self Care Deficits Care Plan (Adult)  Goal: *Acute Goals and Plan of Care (Insert Text)  Description:   FUNCTIONAL STATUS PRIOR TO ADMISSION: Patient was modified independent using a rollator for functional mobility. Patient recently has required assistance for basic and instrumental ADLs. HOME SUPPORT: Patient previously lived with , however, most recently has been living alone with daughters stopping by to assist with ADLs. Occupational Therapy Goals  Initiated 12/20/2022  Patient Goal: Go to rehab and get stronger. 1.  Patient will perform standing grooming with modified independence within 7 day(s). 2.  Patient will perform bathing with modified independence within 7 day(s). 3.  Patient will perform lower body dressing with modified independence within 7 day(s). 4.  Patient will perform toilet transfers with modified independence within 7 day(s). 5.  Patient will perform all aspects of toileting with modified independence within 7 day(s). 6.  Patient will participate in upper extremity therapeutic exercise/activities with independence within 7 day(s). 7.  Patient will utilize energy conservation techniques during functional activities with verbal cues within 7 day(s).   Outcome: Not Met

## 2022-12-20 NOTE — PROGRESS NOTES
Problem: Mobility Impaired (Adult and Pediatric)  Goal: *Acute Goals and Plan of Care (Insert Text)  Description: I with LE HEP x7 days  Supine to sit EOB with CGAx1 x7 days  Sit to stand with min Ax2 x7 days  Stand pivot from bed to chair with min Ax2 x7 days    Future goals TBD as pt progresses with PT    Pt stated goal: to get stronger  Outcome: Not Met    PHYSICAL THERAPY EVALUATION  Patient: Jim Love (59 y.o. female)  Date: 12/20/2022  Primary Diagnosis: CHF exacerbation ([facility] [address] [I50.9]       Precautions:        ASSESSMENT    Pt is a 74 y.o. female admitted to hospital with CHF exacerbation and presents to PT with decreased bed mob, transfers, LE strength, gt, activity tolerance, and overall functional mobility. Pt supine in bed upon PT arrival, agreeable to evaluation. Pt A&O to name/birthday, confused to place/year. PLOF listed below. Based on the objective data described below, the patient presents with generalized weakness, impaired functional mobility, impaired amb, impaired balance, and decreased overall functional mobility. (See below for objective details and assist levels). Overall pt tolerated session fair today with overall mod/max A with bed mob and OOB transfers. Pt req max A for sit to stand attempt x 3 attempts. Pt was not able to fully clear buttocks from EOB with sit to stand attempts. Would likely benefit from 2 person assist for future visits. Pt mod/max A to return to supine in bed and to scoot up to [facility]. Pt will benefit from continued skilled PT to address above deficits and return to PLOF. Current PT DC recommendation Elliot Vance once medically appropriate.        PLAN :  Recommendations and Planned Interventions: bed mobility training, transfer training, gait training, therapeutic exercises, patient and family training/education, and therapeutic activities      Recommend for staff: Encourage HEP in prep for ADLs/mobility, Frequent repositioning to prevent skin breakdown, and Use of bed/chair alarm for safety    Frequency/Duration: Patient will be followed by physical therapy:  3-5x/week to address goals.     Recommendation for discharge: (in order for the patient to meet his/her long term goals)  [address]:   Patient supine in bed upon PT arrival, agreeable to work with PT    OBJECTIVE DATA SUMMARY:   HISTORY:    Past Medical History:   Diagnosis Date    CHF (congestive heart failure) ([facility] [address]     Diabetes ([facility] [address]     Hypertension     Menopause      Past Surgical History:   Procedure Laterality Date    HX IMPLANTABLE CARDIOVERTER DEFIBRILLATOR         Home Situation  Home Environment: Private residence  # Steps to Enter: 3  Rails to Enter: Yes  Hand Rails : Bilateral  One/Two Story Residence: One story  Living Alone: Yes  Support Systems: Child(christopher), Spouse/Significant Other  Patient Expects to be Discharged to[de-identified] Rehab Unit Subacute  Current DME Used/Available at Home: Lorena Lennox, straight, [address], rollator, Shower chair  Tub or Shower Type: Tub/Shower combination    Personal factors and/or comorbidities impacting plan of care:     Home Situation  Home Environment: Private residence  # Steps to Enter: 3  Rails to Enter: Yes  Hand Rails : Bilateral  One/Two Story Residence: One story  Living Alone: Yes  Support Systems: Child(christopher), Spouse/Significant Other  Patient Expects to be Discharged to[de-identified] Rehab Unit Subacute  Current DME Used/Available at Home: Lorena Lennox, straight, [address], rollator, Shower chair  Tub or Shower Type: Tub/Shower combination    EXAMINATION/PRESENTATION/DECISION MAKING:   Critical Behavior:  Neurologic State: Alert  Orientation Level: Oriented to person, Oriented to place, Disoriented to time (Oriented to placeand month with cuing, disoriented to year stating \"1922\")  Cognition: Follows commands       Skin:  open area noted on L buttock, nursing informed and aware    Edema: B LE noted    Range Of Motion:  AROM: Generally decreased, functional   B LE      Strength:    Strength: Generally decreased, functional  Grossly 3+/5 B LE        Tone & Sensation:      Intact to LT B LE    Coordination:  Coordination: Within functional limits    Functional Mobility:  Bed Mobility:  Rolling: Maximum assistance  Supine to Sit: Maximum assistance  Sit to Supine: Moderate assistance (Guiding BLE)    Transfers:  Sit to Stand: Maximum assistance  Stand to Sit: Moderate assistance                      Balance:   Sitting: Impaired  Sitting - Static: Good (unsupported)  Sitting - Dynamic: Fair (occasional)  Standing: Impaired; With support  Standing - Static: Poor;Constant support  Standing - Dynamic : Poor;Constant support  Ambulation/Gait Training:     Pt was not able to fully stand and take steps at EOB during PT session        Functional Measure:  [address] [facility]  How much difficulty does the patient currently have. .. Unable A Lot A Little None   1. Turning over in bed (including adjusting bedclothes, sheets and blankets)? [] 1   [x] 2   [] 3   [] 4   2. Sitting down on and standing up from a chair with arms ( e.g., wheelchair, bedside commode, etc.)   [] 1   [x] 2   [] 3   [] 4   3. Moving from lying on back to sitting on the side of the bed? [] 1   [x] 2   [] 3   [] 4          How much help from another person does the patient currently need. .. Total A Lot A Little None   4. Moving to and from a bed to a chair (including a wheelchair)? [] 1   [x] 2   [] 3   [] 4   5. Need to walk in hospital room? [] 1   [x] 2   [] 3   [] 4   6. Climbing 3-5 steps with a railing? [x] 1   [] 2   [] 3   [] 4   © 2007, Trustees of [facility] [employer], under license to [employer]. All rights reserved     Score:  Initial: 11 Most Recent: X (Date: -- )   Interpretation of Tool:  Represents activities that are increasingly more difficult (i.e. Bed mobility, Transfers, Gait).   Score 24 23 22-20 19-15 14-10 9-7 6   Modifier CH CI CJ CK CL CM CN           Physical Therapy Evaluation Charge Determination   History Examination Presentation Decision-Making   MEDIUM  Complexity : 1-2 comorbidities / personal factors will impact the outcome/ POC  MEDIUM Complexity : 3 Standardized tests and measures addressing body structure, function, activity limitation and / or participation in recreation  LOW Complexity : Stable, uncomplicated  Other Functional Measure [facility] 6 HIGH      Based on the above components, the patient evaluation is determined to be of the following complexity level: LOW     Pain Rating:  No c/o pain during session today    Activity Tolerance:   Fair    After treatment patient left in no apparent distress:   Bed locked and in lowest position Supine in bed, Call bell within reach, Bed / chair alarm activated, and Side rails x 3           COMMUNICATION/EDUCATION:   The patients plan of care was discussed with: Occupational therapist and Case management. Fall prevention education was provided and the patient/caregiver indicated understanding. and Patient/family agree to work toward stated goals and plan of care.       Thank you for this referral.  [employer]   Time Calculation: 22 mins

## 2022-12-20 NOTE — PROGRESS NOTES
Physician Progress Note      Maylin Huerta  CSN #:                  [id number]  :                       1950  ADMIT DATE:       2022 11:03 PM  DISCH DATE:  RESPONDING  PROVIDER #:        Corona Heaton NP          QUERY TEXT:    Pt admitted with SOB and chest pain and has CHF documented. If possible, please document in progress notes and discharge summary further specificity regarding the type and acuity of CHF:      The medical record reflects the following:  Risk Factors: 74year old female, shortness of breath, chest pain, edema  Clinical Indicators:  H&P -  CHF exacerbation   Echo - Left Ventricle: Severely reduced left ventricular systolic function with a visually estimated EF of 25 - 30%. Left ventricle size is normal. Normal wall thickness. Findings consistent with mild concentric hypertrophy. Moderate global hypokinesis present. BNP: >35,000-30,627  Treatment: IV Bumex      Please email [email or web address] with any questions  Options provided:  -- Acute on Chronic Systolic CHF/HFrEF  -- Acute on Chronic Diastolic CHF/HFpEF  -- Acute on Chronic Systolic and Diastolic CHF  -- Acute Systolic CHF/HFrEF  -- Acute Diastolic CHF/HFpEF  -- Acute Systolic and Diastolic CHF  -- Other - I will add my own diagnosis  -- Disagree - Not applicable / Not valid  -- Disagree - Clinically unable to determine / Unknown  -- Refer to Clinical Documentation Reviewer    PROVIDER RESPONSE TEXT:    This patient is in acute on chronic systolic CHF/HFrEF. Query created by:  Francheska Childs on 2022 2:13 PM      Electronically signed by:  Corona Heaton NP 2022 11:47 AM

## 2022-12-20 NOTE — PROGRESS NOTES
Patient recc for SNF by PT. CM met with patient in room to discuss, patient is agreeable to SNF, gave choice for [facility] and [facility], CM explained she would need insurance auth prior to d/c, she verbalized understanding. CM contacted patient's daughter, Venkat Gabrieloter [phone], at patient's request, to notify of DCP, she is agreeable as well. Referrals sent to SNF awaiting possible acceptance, CM will need OT note in order to start insurance auth.

## 2022-12-20 NOTE — PROGRESS NOTES
Progress Note      12/20/2022 3:03 PM  NAME: Last Santos   MRN:  [id number]   Admit Diagnosis: CHF exacerbation ([facility] [address] [I50.9]          Assessment/Plan:   Decompensated CHF,Acute on Chronic, improving, continue diuresis, monitor electrolytes. Echo on this admission with ejection fraction of 25 to 30%, previously reported with improved LV function. Troponin leak, flat, EKG with paced rhythm, without acute changes. History of SVT, status post ablation, status post ICD, on amiodarone. Hypothyroidism, on levothyroxine, elevated TSH. ? Compliant with Synthroid    Chronic kidney disease, creatinine improved from admission,? Plateauing    Diabetes mellitus     Edema, skin breakdown, impaired mobility, elevated D-dimer, negative venous Dopplers. []       High complexity decision making was performed in this patient at high risk for decompensation with multiple organ involvement. Subjective:     Last Santos denies chest pain, dyspnea. Patient says she has been walking to the bathroom with a walker. Discussed with RN events overnight. Review of Systems:    Symptom Y/N Comments  Symptom Y/N Comments   Fever/Chills N   Chest Pain N    Poor Appetite N   Edema N    Cough N   Abdominal Pain N    Sputum N   Joint Pain N    SOB/MOSES N   Pruritis/Rash N    Nausea/vomit N   Tolerating PT/OT Y    Diarrhea N   Tolerating Diet Y    Constipation N   Other       Could NOT obtain due to:      Objective:      Physical Exam:    Last 24hrs VS reviewed since prior progress note.  Most recent are:    Visit Vitals  /81 (BP Patient Position: At rest;Semi fowlers)   Pulse 67   Temp 97.4 °F (36.3 °C)   Resp 20   Ht 5' 3\" (1.6 m)   Wt 100.6 kg (221 lb 12.5 oz)   SpO2 100%   Breastfeeding No   BMI 39.29 kg/m²       Intake/Output Summary (Last 24 hours) at 12/20/2022 1401  Last data filed at 12/20/2022 3507  Gross per 24 hour   Intake --   Output 600 ml   Net -600 ml          General Appearance: Well developed, well nourished, alert; no acute distress. Ears/Nose/Mouth/Throat: Hearing grossly normal; moist mucous membranes  Neck: Supple. Chest: Lungs clear to auscultation bilaterally. Cardiovascular: Regular rate and rhythm, S1S2 normal, no murmur. Abdomen: Soft, non-tender, bowel sounds are active. Extremities: 1+ edema bilaterally. Skin: Warm and dry. []         Post-cath site without hematoma, bruit, tenderness, or thrill. Distal pulses intact. PMH/ reviewed - no change compared to H&P    Data Review    Telemetry:     EKG:   []  No new EKG for review    Lab Data Personally Reviewed:    Recent Labs     12/20/22  0717 12/19/22  0820   WBC 3.7 4.1   HGB 11.2* 11.0*   HCT 35.3 34.7*    227       Recent Labs     12/20/22  0717 12/19/22  0820 12/17/22  2357   INR 1.2* 1.2* 1.2*   PTP 15.6* 15.0* 15.5*   APTT  --   --  29.8        Recent Labs     12/20/22  0717 12/19/22  0820 12/17/22  2357    144 144   K 4.3 3.7 3.4*   * 109* 109*   CO2 28 27 26   BUN 30* 30* 27*   CREA 2.12* 1.97* 1.99*   * 120* 127*   CA 8.6 9.0 8.4*   MG  --   --  2.1       No results for input(s): CPK, CKNDX, TROIQ in the last 72 hours. No lab exists for component: CPKMB  No results found for: CHOL, CHOLX, CHLST, CHOLV, HDL, HDLP, LDL, LDLC, DLDLP, TGLX, TRIGL, TRIGP, CHHD, CHHDX    Recent Labs     12/17/22  2357 12/17/22  2040   * 136*   TP 6.6 7.3   ALB 3.2* 3.2*   GLOB 3.4 4.1*       No results for input(s): PH, PCO2, PO2 in the last 72 hours.     Medications Personally Reviewed:    Current Facility-Administered Medications   Medication Dose Route Frequency    zinc oxide-white petrolatum 20-75 % topical paste   Topical EVERY OTHER DAY    bumetanide (BUMEX) injection 1 mg  1 mg IntraVENous BID    amiodarone (CORDARONE) tablet 100 mg  100 mg Oral DAILY    hydrALAZINE (APRESOLINE) tablet 10 mg  10 mg Oral BID    levothyroxine (SYNTHROID) tablet 50 mcg  50 mcg Oral 6am    metoprolol succinate (TOPROL-XL) XL tablet 50 mg  50 mg Oral DAILY    pantoprazole (PROTONIX) tablet 40 mg  40 mg Oral DAILY    atorvastatin (LIPITOR) tablet 10 mg  10 mg Oral QHS    spironolactone (ALDACTONE) tablet 25 mg  25 mg Oral DAILY    sodium chloride (NS) flush 5-40 mL  5-40 mL IntraVENous Q8H    sodium chloride (NS) flush 5-40 mL  5-40 mL IntraVENous PRN    acetaminophen (TYLENOL) tablet 650 mg  650 mg Oral Q6H PRN    Or    acetaminophen (TYLENOL) suppository 650 mg  650 mg Rectal Q6H PRN    polyethylene glycol (MIRALAX) packet 17 g  17 g Oral DAILY PRN    ondansetron (ZOFRAN ODT) tablet 4 mg  4 mg Oral Q8H PRN    Or    ondansetron (ZOFRAN) injection 4 mg  4 mg IntraVENous Q6H PRN    enoxaparin (LOVENOX) injection 30 mg  30 mg SubCUTAneous DAILY    insulin lispro (HUMALOG) injection   SubCUTAneous TIDAC    glucose chewable tablet 16 g  4 Tablet Oral PRN    glucagon (GLUCAGEN) injection 1 mg  1 mg IntraMUSCular PRN    dextrose 10% infusion 0-250 mL  0-250 mL IntraVENous PRN    lisinopriL (PRINIVIL, ZESTRIL) tablet 10 mg  10 mg Oral DAILY    aspirin chewable tablet 81 mg  81 mg Oral DAILY    potassium chloride SR (KLOR-CON 10) tablet 40 mEq  40 mEq Oral DAILY         Lesia Nicole MD

## 2022-12-21 LAB
ANION GAP SERPL CALC-SCNC: 4 MMOL/L (ref 5–15)
APPEARANCE UR: CLEAR
BACTERIA URNS QL MICRO: NEGATIVE /HPF
BASOPHILS # BLD: 0.1 K/UL (ref 0–0.1)
BASOPHILS NFR BLD: 1 % (ref 0–1)
BILIRUB UR QL: NEGATIVE
BUN SERPL-MCNC: 32 MG/DL (ref 6–20)
BUN/CREAT SERPL: 15 (ref 12–20)
CA-I BLD-MCNC: 8.9 MG/DL (ref 8.5–10.1)
CHLORIDE SERPL-SCNC: 108 MMOL/L (ref 97–108)
CO2 SERPL-SCNC: 29 MMOL/L (ref 21–32)
COLOR UR: ABNORMAL
CREAT SERPL-MCNC: 2.16 MG/DL (ref 0.55–1.02)
DIFFERENTIAL METHOD BLD: ABNORMAL
EOSINOPHIL # BLD: 0 K/UL (ref 0–0.4)
EOSINOPHIL NFR BLD: 1 % (ref 0–7)
ERYTHROCYTE [DISTWIDTH] IN BLOOD BY AUTOMATED COUNT: 15.9 % (ref 11.5–14.5)
GLUCOSE BLD STRIP.AUTO-MCNC: 101 MG/DL (ref 65–100)
GLUCOSE BLD STRIP.AUTO-MCNC: 112 MG/DL (ref 65–100)
GLUCOSE BLD STRIP.AUTO-MCNC: 98 MG/DL (ref 65–100)
GLUCOSE SERPL-MCNC: 105 MG/DL (ref 65–100)
GLUCOSE UR STRIP.AUTO-MCNC: NEGATIVE MG/DL
HCT VFR BLD AUTO: 34.1 % (ref 35–47)
HGB BLD-MCNC: 10.9 G/DL (ref 11.5–16)
HGB UR QL STRIP: NEGATIVE
HYALINE CASTS URNS QL MICRO: ABNORMAL /LPF (ref 0–5)
IMM GRANULOCYTES # BLD AUTO: 0 K/UL (ref 0–0.04)
IMM GRANULOCYTES NFR BLD AUTO: 1 % (ref 0–0.5)
INR PPP: 1.2 (ref 0.9–1.1)
KETONES UR QL STRIP.AUTO: NEGATIVE MG/DL
LEUKOCYTE ESTERASE UR QL STRIP.AUTO: NEGATIVE
LYMPHOCYTES # BLD: 1.4 K/UL (ref 0.8–3.5)
LYMPHOCYTES NFR BLD: 32 % (ref 12–49)
MCH RBC QN AUTO: 28.4 PG (ref 26–34)
MCHC RBC AUTO-ENTMCNC: 32 G/DL (ref 30–36.5)
MCV RBC AUTO: 88.8 FL (ref 80–99)
MONOCYTES # BLD: 0.7 K/UL (ref 0–1)
MONOCYTES NFR BLD: 15 % (ref 5–13)
NEUTS SEG # BLD: 2.2 K/UL (ref 1.8–8)
NEUTS SEG NFR BLD: 50 % (ref 32–75)
NITRITE UR QL STRIP.AUTO: NEGATIVE
NRBC # BLD: 0.02 K/UL (ref 0–0.01)
NRBC BLD-RTO: 0.5 PER 100 WBC
PERFORMED BY, TECHID: ABNORMAL
PERFORMED BY, TECHID: ABNORMAL
PERFORMED BY, TECHID: NORMAL
PH UR STRIP: 6.5 [PH]
PLATELET # BLD AUTO: 211 K/UL (ref 150–400)
PMV BLD AUTO: 10.5 FL (ref 8.9–12.9)
POTASSIUM SERPL-SCNC: 4.2 MMOL/L (ref 3.5–5.1)
PROT UR STRIP-MCNC: 30 MG/DL
PROTHROMBIN TIME: 15.4 SEC (ref 11.9–14.6)
RBC # BLD AUTO: 3.84 M/UL (ref 3.8–5.2)
RBC #/AREA URNS HPF: ABNORMAL /HPF (ref 0–5)
SODIUM SERPL-SCNC: 141 MMOL/L (ref 136–145)
SP GR UR REFRACTOMETRY: 1 (ref 1–1.03)
UROBILINOGEN UR QL STRIP.AUTO: 0.1 EU/DL (ref 0.2–1)
WBC # BLD AUTO: 4.3 K/UL (ref 3.6–11)
WBC URNS QL MICRO: ABNORMAL /HPF (ref 0–4)

## 2022-12-21 PROCEDURE — 82962 GLUCOSE BLOOD TEST: CPT

## 2022-12-21 PROCEDURE — 74011250637 HC RX REV CODE- 250/637: Performed by: INTERNAL MEDICINE

## 2022-12-21 PROCEDURE — 85025 COMPLETE CBC W/AUTO DIFF WBC: CPT

## 2022-12-21 PROCEDURE — 36415 COLL VENOUS BLD VENIPUNCTURE: CPT

## 2022-12-21 PROCEDURE — 74011000250 HC RX REV CODE- 250: Performed by: INTERNAL MEDICINE

## 2022-12-21 PROCEDURE — 74011250637 HC RX REV CODE- 250/637: Performed by: NURSE PRACTITIONER

## 2022-12-21 PROCEDURE — 65270000029 HC RM PRIVATE

## 2022-12-21 PROCEDURE — 81001 URINALYSIS AUTO W/SCOPE: CPT

## 2022-12-21 PROCEDURE — 74011250636 HC RX REV CODE- 250/636: Performed by: INTERNAL MEDICINE

## 2022-12-21 PROCEDURE — 80048 BASIC METABOLIC PNL TOTAL CA: CPT

## 2022-12-21 PROCEDURE — 85610 PROTHROMBIN TIME: CPT

## 2022-12-21 RX ORDER — QUETIAPINE FUMARATE 25 MG/1
25 TABLET, FILM COATED ORAL 2 TIMES DAILY
Status: DISCONTINUED | OUTPATIENT
Start: 2022-12-21 | End: 2022-12-23 | Stop reason: HOSPADM

## 2022-12-21 RX ADMIN — ATORVASTATIN CALCIUM 10 MG: 10 TABLET, FILM COATED ORAL at 21:24

## 2022-12-21 RX ADMIN — ENOXAPARIN SODIUM 30 MG: 100 INJECTION SUBCUTANEOUS at 09:56

## 2022-12-21 RX ADMIN — HYDRALAZINE HYDROCHLORIDE 10 MG: 10 TABLET, FILM COATED ORAL at 21:24

## 2022-12-21 RX ADMIN — HYDRALAZINE HYDROCHLORIDE 10 MG: 10 TABLET, FILM COATED ORAL at 09:56

## 2022-12-21 RX ADMIN — LISINOPRIL 10 MG: 10 TABLET ORAL at 09:56

## 2022-12-21 RX ADMIN — SODIUM CHLORIDE, PRESERVATIVE FREE 10 ML: 5 INJECTION INTRAVENOUS at 06:22

## 2022-12-21 RX ADMIN — SPIRONOLACTONE 25 MG: 25 TABLET ORAL at 09:56

## 2022-12-21 RX ADMIN — METOPROLOL SUCCINATE 50 MG: 50 TABLET, EXTENDED RELEASE ORAL at 09:56

## 2022-12-21 RX ADMIN — SODIUM CHLORIDE, PRESERVATIVE FREE 10 ML: 5 INJECTION INTRAVENOUS at 21:25

## 2022-12-21 RX ADMIN — POTASSIUM CHLORIDE 40 MEQ: 750 TABLET, FILM COATED, EXTENDED RELEASE ORAL at 09:56

## 2022-12-21 RX ADMIN — ASPIRIN 81 MG CHEWABLE TABLET 81 MG: 81 TABLET CHEWABLE at 09:56

## 2022-12-21 RX ADMIN — AMIODARONE HYDROCHLORIDE 100 MG: 200 TABLET ORAL at 09:56

## 2022-12-21 RX ADMIN — PANTOPRAZOLE SODIUM 40 MG: 40 TABLET, DELAYED RELEASE ORAL at 09:56

## 2022-12-21 RX ADMIN — QUETIAPINE FUMARATE 25 MG: 25 TABLET ORAL at 09:56

## 2022-12-21 RX ADMIN — LEVOTHYROXINE SODIUM 50 MCG: 0.03 TABLET ORAL at 06:19

## 2022-12-21 RX ADMIN — WHITE PETROLATUM,ZINC OXIDE: 20; 75 PASTE TOPICAL at 13:37

## 2022-12-21 RX ADMIN — BUMETANIDE 1 MG: 0.25 INJECTION, SOLUTION INTRAMUSCULAR; INTRAVENOUS at 09:56

## 2022-12-21 RX ADMIN — BUMETANIDE 1 MG: 0.25 INJECTION, SOLUTION INTRAMUSCULAR; INTRAVENOUS at 21:25

## 2022-12-21 RX ADMIN — SODIUM CHLORIDE, PRESERVATIVE FREE 10 ML: 5 INJECTION INTRAVENOUS at 14:18

## 2022-12-21 NOTE — PROGRESS NOTES
Patient accepted with [facility], Adonica Coweta has been started, CM awaiting auth reference number. CM continues to follow and monitor for needs.

## 2022-12-21 NOTE — PROGRESS NOTES
Patient has been declined by [facility] as they are not in network with patient's insurance. CM attempted to speak with [facility] admissions, [phone], however they were not in the building at this time, message left, awaiting callback. CM continues to follow, patient will require insurance auth prior to d/c.

## 2022-12-21 NOTE — PROGRESS NOTES
Patient is confused and stating she is going home now and will call the  if we don't let her leave. RN notified NP Pati Patel and her daughter Florian Everett.  Will continue to monitor

## 2022-12-21 NOTE — PROGRESS NOTES
Hospitalist Progress Note            Daily Progress Note: 12/21/2022 9:31 AM  Hospital course:     Reno Thompson is a 74 y.o. female with PMH of CHF with pacer, SVT AICD, NICM, diabetes, and hypertension presented to outside ED with chief complaint of shortness of breath for the past week. Unfortunately poor historian and unable to provide detailed history. She reports dyspnea with exertion and LE edema, but denies orthopnea. On admission to the ED denies any chest pain for the past few weeks. From outside ED, BNP > 22837, troponin 131. Chest X-ray showed pulmonary edema. Admitted for further management of CHF exacerbation, non-STEMI, elevated troponins, elevated BNP, shortness of breath and tachycardia. Cardiology consulted. Started on IV Bumex. Monitoring electrolytes and renal function closely for replacement while on diuretics. 2D echo to assess heart function revealing EF 25 to 30% with moderate global hypokinesis. Lower extremity venous Doppler studies were negative for acute findings. PT and OT consulted to evaluate the patient for disposition needs and poor mobility. Case management is working on SNF placement in [facility]. Subjective: Follow-up examination of patient at bedside. Today the patient is confused. Spoke with patient's daughter at bedside. She asked about psychiatry consult, I informed her they were legitimate reasons for her to be confused sundowning insomnia etc.  Discussed that psychiatry to consult for confusion would best be done when she is not hospitalized to diagnose dementia. Discussed treating with antipsychotic and sleeping agent. Assessment/Plan:   Active Problems:    CHF exacerbation ([address] (12/18/2022)    # CHF exacerbation  #History of cardiomyopathy  - IV Bumex  - ordered ECHO revealing EF 25 to 30% with moderate global hypokinesis  - Monitor I/O  - Continue home medications.   PO metoprolol and aldactone.   -Cardiology following ordered venous Doppler study- Low sodium diet. - Suspect contributing to elevated troponin. CHOCO 4, however no chest pain. Hold off IV heparin ggt. # Elevated troponin  - suspect secondary to above  - Down-trending. Peaked at 140. # Hypertension   - Continue home medications. PO hydralazine, spironolactone, metoprolol. #History of supraventricular tachycardia  - Paced rhythm on my evaluation.   - Continue PO amiodarone and warfarin. # Diabetes  - POC + correctional insulin  - Check HbA1c. # CKD stage IV (eGRR 15-29)  - creatinine stable. Continue to monitor on diuretics. # Hypothyroidism  - Continue home medications. PO levothyroxine.      #Confusion  -We will treat with Seroquel twice daily    DVT Prophylaxis: Lovenox  Code Status: Full Code  POA/NOK:    Disposition and discharge barriers:   Cardiology consult following  IV diuresing  SNF placement pending    Care Plan discussed with: Patient, staff, IDR team        Current Facility-Administered Medications   Medication Dose Route Frequency    QUEtiapine (SEROquel) tablet 25 mg  25 mg Oral BID    zinc oxide-white petrolatum 20-75 % topical paste   Topical EVERY OTHER DAY    bumetanide (BUMEX) injection 1 mg  1 mg IntraVENous BID    amiodarone (CORDARONE) tablet 100 mg  100 mg Oral DAILY    hydrALAZINE (APRESOLINE) tablet 10 mg  10 mg Oral BID    levothyroxine (SYNTHROID) tablet 50 mcg  50 mcg Oral 6am    metoprolol succinate (TOPROL-XL) XL tablet 50 mg  50 mg Oral DAILY    pantoprazole (PROTONIX) tablet 40 mg  40 mg Oral DAILY    atorvastatin (LIPITOR) tablet 10 mg  10 mg Oral QHS    spironolactone (ALDACTONE) tablet 25 mg  25 mg Oral DAILY    sodium chloride (NS) flush 5-40 mL  5-40 mL IntraVENous Q8H    sodium chloride (NS) flush 5-40 mL  5-40 mL IntraVENous PRN    acetaminophen (TYLENOL) tablet 650 mg  650 mg Oral Q6H PRN    Or    acetaminophen (TYLENOL) suppository 650 mg  650 mg Rectal Q6H PRN    polyethylene glycol (MIRALAX) packet 17 g  17 g Oral DAILY PRN    ondansetron (ZOFRAN ODT) tablet 4 mg  4 mg Oral Q8H PRN    Or    ondansetron (ZOFRAN) injection 4 mg  4 mg IntraVENous Q6H PRN    enoxaparin (LOVENOX) injection 30 mg  30 mg SubCUTAneous DAILY    insulin lispro (HUMALOG) injection   SubCUTAneous TIDAC    glucose chewable tablet 16 g  4 Tablet Oral PRN    glucagon (GLUCAGEN) injection 1 mg  1 mg IntraMUSCular PRN    dextrose 10% infusion 0-250 mL  0-250 mL IntraVENous PRN    lisinopriL (PRINIVIL, ZESTRIL) tablet 10 mg  10 mg Oral DAILY    aspirin chewable tablet 81 mg  81 mg Oral DAILY    potassium chloride SR (KLOR-CON 10) tablet 40 mEq  40 mEq Oral DAILY        REVIEW OF SYSTEMS    Review of Systems   Constitutional:  Positive for malaise/fatigue. HENT:  Positive for congestion. Respiratory:  Positive for shortness of breath. Cardiovascular:  Positive for leg swelling. Negative for chest pain. Musculoskeletal:  Positive for myalgias. Neurological:  Positive for weakness. Psychiatric/Behavioral:  Positive for memory loss. The patient is nervous/anxious. Objective:     Visit Vitals  BP (!) 144/83 (BP 1 Location: Left lower arm, BP Patient Position: At rest)   Pulse 66   Temp 97.6 °F (36.4 °C)   Resp 18   Ht 5' 3\" (1.6 m)   Wt 100.6 kg (221 lb 12.5 oz)   SpO2 99%   Breastfeeding No   BMI 39.29 kg/m²    O2 Flow Rate (L/min): 2 l/min O2 Device: Nasal cannula    Temp (24hrs), Av.7 °F (36.5 °C), Min:97.4 °F (36.3 °C), Max:98.1 °F (36.7 °C)        PHYSICAL EXAM:    Physical Exam  Constitutional:       Appearance: She is obese. She is ill-appearing. HENT:      Nose: Congestion present. Cardiovascular:      Rate and Rhythm: Regular rhythm. Tachycardia present. Pulmonary:      Effort: Respiratory distress present. Abdominal:      General: There is distension. Tenderness: There is no abdominal tenderness. Musculoskeletal:      Right lower leg: Edema present. Left lower leg: Edema present.    Skin:     General: Skin is warm.   Neurological:      Motor: Weakness present. Data Review        DUPLEX LOWER EXT VENOUS BILAT   Final Result          Intake and Output:  Current Shift: No intake/output data recorded. Last three shifts: 12/19 1901 - 12/21 0700  In: -   Out: 1700 [Urine:1700]      Lab/Data Review:  Recent Labs     12/21/22  0646 12/20/22  0717 12/19/22  0820   WBC 4.3 3.7 4.1   HGB 10.9* 11.2* 11.0*   HCT 34.1* 35.3 34.7*    230 227       Recent Labs     12/21/22  0646 12/20/22  0717 12/19/22  0820    142 144   K 4.2 4.3 3.7    109* 109*   CO2 29 28 27   * 125* 120*   BUN 32* 30* 30*   CREA 2.16* 2.12* 1.97*   CA 8.9 8.6 9.0   INR 1.2* 1.2* 1.2*       No results for input(s): PH, PCO2, PO2, HCO3, FIO2 in the last 72 hours. _____________________________________________________________________________  Time spent in direct care including coordination of service, review of data and examination: > 35 minutes    ______________________________________________________________________________    John Sam NP    This is dictation was done by [email or web address], computer voice recognition software. Quite often unanticipated grammatical, syntax, homophones and other interpretive errors or inadvertently transcribed by the computer software. Please excuse errors that have escaped final proofreading. Thank you.

## 2022-12-21 NOTE — PROGRESS NOTES
PT attempt made however pt very confused and agitated, threatening to call  and unsure why she is in the hospital. Pt not willing to participate and not appropriate at this time for PT. Will follow up another time.

## 2022-12-21 NOTE — PROGRESS NOTES
OT tx attempted at 4223 however ,Pt very agitated, threatening to call family and . Will continue to follow and re-attempt OT at a later time. Thank you.

## 2022-12-22 LAB
ANION GAP SERPL CALC-SCNC: 8 MMOL/L (ref 5–15)
BASOPHILS # BLD: 0 K/UL (ref 0–0.1)
BASOPHILS # BLD: 0 K/UL (ref 0–0.1)
BASOPHILS NFR BLD: 1 % (ref 0–1)
BASOPHILS NFR BLD: 1 % (ref 0–1)
BUN SERPL-MCNC: 30 MG/DL (ref 6–20)
BUN/CREAT SERPL: 14 (ref 12–20)
CA-I BLD-MCNC: 9 MG/DL (ref 8.5–10.1)
CHLORIDE SERPL-SCNC: 107 MMOL/L (ref 97–108)
CO2 SERPL-SCNC: 28 MMOL/L (ref 21–32)
CREAT SERPL-MCNC: 2.14 MG/DL (ref 0.55–1.02)
DIFFERENTIAL METHOD BLD: ABNORMAL
DIFFERENTIAL METHOD BLD: ABNORMAL
EOSINOPHIL # BLD: 0.1 K/UL (ref 0–0.4)
EOSINOPHIL # BLD: 0.1 K/UL (ref 0–0.4)
EOSINOPHIL NFR BLD: 1 % (ref 0–7)
EOSINOPHIL NFR BLD: 1 % (ref 0–7)
ERYTHROCYTE [DISTWIDTH] IN BLOOD BY AUTOMATED COUNT: 16 % (ref 11.5–14.5)
ERYTHROCYTE [DISTWIDTH] IN BLOOD BY AUTOMATED COUNT: 16.1 % (ref 11.5–14.5)
GLUCOSE BLD STRIP.AUTO-MCNC: 110 MG/DL (ref 65–100)
GLUCOSE BLD STRIP.AUTO-MCNC: 116 MG/DL (ref 65–100)
GLUCOSE BLD STRIP.AUTO-MCNC: 79 MG/DL (ref 65–100)
GLUCOSE SERPL-MCNC: 83 MG/DL (ref 65–100)
HCT VFR BLD AUTO: 34.7 % (ref 35–47)
HCT VFR BLD AUTO: 34.9 % (ref 35–47)
HGB BLD-MCNC: 10.9 G/DL (ref 11.5–16)
HGB BLD-MCNC: 11 G/DL (ref 11.5–16)
IMM GRANULOCYTES # BLD AUTO: 0 K/UL (ref 0–0.04)
IMM GRANULOCYTES # BLD AUTO: 0 K/UL (ref 0–0.04)
IMM GRANULOCYTES NFR BLD AUTO: 0 % (ref 0–0.5)
IMM GRANULOCYTES NFR BLD AUTO: 0 % (ref 0–0.5)
INR PPP: 1.2 (ref 0.9–1.1)
LYMPHOCYTES # BLD: 1.2 K/UL (ref 0.8–3.5)
LYMPHOCYTES # BLD: 1.4 K/UL (ref 0.8–3.5)
LYMPHOCYTES NFR BLD: 30 % (ref 12–49)
LYMPHOCYTES NFR BLD: 32 % (ref 12–49)
MCH RBC QN AUTO: 28 PG (ref 26–34)
MCH RBC QN AUTO: 28.4 PG (ref 26–34)
MCHC RBC AUTO-ENTMCNC: 31.2 G/DL (ref 30–36.5)
MCHC RBC AUTO-ENTMCNC: 31.7 G/DL (ref 30–36.5)
MCV RBC AUTO: 89.7 FL (ref 80–99)
MCV RBC AUTO: 89.7 FL (ref 80–99)
MONOCYTES # BLD: 0.6 K/UL (ref 0–1)
MONOCYTES # BLD: 0.8 K/UL (ref 0–1)
MONOCYTES NFR BLD: 16 % (ref 5–13)
MONOCYTES NFR BLD: 17 % (ref 5–13)
NEUTS SEG # BLD: 1.9 K/UL (ref 1.8–8)
NEUTS SEG # BLD: 2.3 K/UL (ref 1.8–8)
NEUTS SEG NFR BLD: 50 % (ref 32–75)
NEUTS SEG NFR BLD: 51 % (ref 32–75)
NRBC # BLD: 0 K/UL (ref 0–0.01)
NRBC # BLD: 0 K/UL (ref 0–0.01)
NRBC BLD-RTO: 0 PER 100 WBC
NRBC BLD-RTO: 0 PER 100 WBC
PERFORMED BY, TECHID: ABNORMAL
PERFORMED BY, TECHID: ABNORMAL
PERFORMED BY, TECHID: NORMAL
PLATELET # BLD AUTO: 194 K/UL (ref 150–400)
PLATELET # BLD AUTO: 215 K/UL (ref 150–400)
PMV BLD AUTO: 10.3 FL (ref 8.9–12.9)
PMV BLD AUTO: 11 FL (ref 8.9–12.9)
POTASSIUM SERPL-SCNC: 4.4 MMOL/L (ref 3.5–5.1)
PROTHROMBIN TIME: 15 SEC (ref 11.9–14.6)
RBC # BLD AUTO: 3.87 M/UL (ref 3.8–5.2)
RBC # BLD AUTO: 3.89 M/UL (ref 3.8–5.2)
SODIUM SERPL-SCNC: 143 MMOL/L (ref 136–145)
WBC # BLD AUTO: 3.7 K/UL (ref 3.6–11)
WBC # BLD AUTO: 4.5 K/UL (ref 3.6–11)

## 2022-12-22 PROCEDURE — 82962 GLUCOSE BLOOD TEST: CPT

## 2022-12-22 PROCEDURE — 85730 THROMBOPLASTIN TIME PARTIAL: CPT

## 2022-12-22 PROCEDURE — 85610 PROTHROMBIN TIME: CPT

## 2022-12-22 PROCEDURE — 74011000250 HC RX REV CODE- 250: Performed by: INTERNAL MEDICINE

## 2022-12-22 PROCEDURE — 74011250636 HC RX REV CODE- 250/636: Performed by: INTERNAL MEDICINE

## 2022-12-22 PROCEDURE — 86900 BLOOD TYPING SEROLOGIC ABO: CPT

## 2022-12-22 PROCEDURE — 97530 THERAPEUTIC ACTIVITIES: CPT

## 2022-12-22 PROCEDURE — 80048 BASIC METABOLIC PNL TOTAL CA: CPT

## 2022-12-22 PROCEDURE — 65270000029 HC RM PRIVATE

## 2022-12-22 PROCEDURE — 85025 COMPLETE CBC W/AUTO DIFF WBC: CPT

## 2022-12-22 PROCEDURE — 74011250637 HC RX REV CODE- 250/637: Performed by: NURSE PRACTITIONER

## 2022-12-22 PROCEDURE — 74011250637 HC RX REV CODE- 250/637: Performed by: INTERNAL MEDICINE

## 2022-12-22 PROCEDURE — 36415 COLL VENOUS BLD VENIPUNCTURE: CPT

## 2022-12-22 RX ORDER — QUETIAPINE FUMARATE 25 MG/1
25 TABLET, FILM COATED ORAL 2 TIMES DAILY
Qty: 60 TABLET | Refills: 0 | Status: SHIPPED | OUTPATIENT
Start: 2022-12-22 | End: 2023-01-21

## 2022-12-22 RX ORDER — GUAIFENESIN 100 MG/5ML
81 LIQUID (ML) ORAL DAILY
Qty: 30 TABLET | Refills: 0 | Status: SHIPPED | OUTPATIENT
Start: 2022-12-23 | End: 2023-01-22

## 2022-12-22 RX ADMIN — BUMETANIDE 1 MG: 0.25 INJECTION, SOLUTION INTRAMUSCULAR; INTRAVENOUS at 21:16

## 2022-12-22 RX ADMIN — HYDRALAZINE HYDROCHLORIDE 10 MG: 10 TABLET, FILM COATED ORAL at 21:16

## 2022-12-22 RX ADMIN — METOPROLOL SUCCINATE 50 MG: 50 TABLET, EXTENDED RELEASE ORAL at 10:04

## 2022-12-22 RX ADMIN — QUETIAPINE FUMARATE 25 MG: 25 TABLET ORAL at 10:03

## 2022-12-22 RX ADMIN — HYDRALAZINE HYDROCHLORIDE 10 MG: 10 TABLET, FILM COATED ORAL at 10:04

## 2022-12-22 RX ADMIN — SODIUM CHLORIDE, PRESERVATIVE FREE 10 ML: 5 INJECTION INTRAVENOUS at 17:14

## 2022-12-22 RX ADMIN — SODIUM CHLORIDE, PRESERVATIVE FREE 10 ML: 5 INJECTION INTRAVENOUS at 21:16

## 2022-12-22 RX ADMIN — QUETIAPINE FUMARATE 25 MG: 25 TABLET ORAL at 21:16

## 2022-12-22 RX ADMIN — LISINOPRIL 10 MG: 10 TABLET ORAL at 10:04

## 2022-12-22 RX ADMIN — SPIRONOLACTONE 25 MG: 25 TABLET ORAL at 10:04

## 2022-12-22 RX ADMIN — PANTOPRAZOLE SODIUM 40 MG: 40 TABLET, DELAYED RELEASE ORAL at 10:04

## 2022-12-22 RX ADMIN — SODIUM CHLORIDE, PRESERVATIVE FREE 10 ML: 5 INJECTION INTRAVENOUS at 05:31

## 2022-12-22 RX ADMIN — POTASSIUM CHLORIDE 40 MEQ: 750 TABLET, FILM COATED, EXTENDED RELEASE ORAL at 10:03

## 2022-12-22 RX ADMIN — ASPIRIN 81 MG CHEWABLE TABLET 81 MG: 81 TABLET CHEWABLE at 10:04

## 2022-12-22 RX ADMIN — LEVOTHYROXINE SODIUM 50 MCG: 0.03 TABLET ORAL at 05:30

## 2022-12-22 RX ADMIN — BUMETANIDE 1 MG: 0.25 INJECTION, SOLUTION INTRAMUSCULAR; INTRAVENOUS at 10:04

## 2022-12-22 RX ADMIN — ENOXAPARIN SODIUM 30 MG: 100 INJECTION SUBCUTANEOUS at 10:04

## 2022-12-22 RX ADMIN — ATORVASTATIN CALCIUM 10 MG: 10 TABLET, FILM COATED ORAL at 21:16

## 2022-12-22 RX ADMIN — AMIODARONE HYDROCHLORIDE 100 MG: 200 TABLET ORAL at 10:03

## 2022-12-22 NOTE — PROGRESS NOTES
PHYSICAL THERAPY TREATMENT  Patient: Che Abrams (67 y.o. female)  Date: 12/22/2022  Diagnosis: CHF exacerbation ([facility] [I50.9] <principal problem not specified>      Precautions:    Chart, physical therapy assessment, plan of care and goals were reviewed. ASSESSMENT  Patient continues with skilled PT services and is progressing towards goals. Pt in bed upon PT arrival, pleasantly agreeable to session. Patient improved overall with mobility, requiring min-mod A for bed mob, transfers, and gait. Patient stood better from bed with increased difficulty from the low chair. (See below for objective details and assist levels). Patient amb well with min A x2 bed>chair however limited distance and progress with further therapy as increased time was spent with cleaning BM from incontinence several times from bed, during amb, chair, and back to bed. EPSTEIN assisted with pericare, dressing, and other ADL activities, see EPSTEIN note for more descriptive detail. Overall pt tolerated session fair today with improved mobility but limited gait due to incontinence and frequent BMs. Will continue to benefit from skilled PT services, and will continue to progress as tolerated. Rec d/c to SNF. Current Level of Function Impacting Discharge (mobility/balance): weakness, unsteady gait, min-mod A x2    Other factors to consider for discharge: safety, weakness, assist at home         PLAN :  Patient continues to benefit from skilled intervention to address the above impairments. Continue treatment per established plan of care to address goals. Recommendation for discharge: (in order for the patient to meet his/her long term goals)  Elliot Vance    This discharge recommendation:  Has been made in collaboration with the attending provider and/or case management    IF patient discharges home will need the following DME: to be determined (TBD)       SUBJECTIVE:   Patient stated I am so sorry ladies.     OBJECTIVE DATA SUMMARY:   Critical Behavior:  Neurologic State: Alert, Eyes open spontaneously  Orientation Level: Oriented to person, Oriented to situation  Cognition: Follows commands     Functional Mobility Training:  Bed Mobility:  Rolling: Minimum assistance  Supine to Sit: Moderate assistance  Sit to Supine: Moderate assistance  Scooting: Minimum assistance    Transfers:  Sit to Stand: Minimum assistance; Moderate assistance;Assist x2 (min from bed, mod from chair)  Stand to Sit: Minimum assistance; Moderate assistance;Assist x2  Bed to Chair: Minimum assistance;Assist x2      Balance:  Sitting: Intact; With support  Sitting - Static: Good (unsupported)  Sitting - Dynamic: Fair (occasional)  Standing: Impaired; With support  Standing - Static: Constant support; Fair  Standing - Dynamic : Constant support; Fair  Ambulation/Gait Training:  Distance (ft): 6 Feet (ft)  Assistive Device: Gait belt;Walker, rolling  Ambulation - Level of Assistance: Minimal assistance;Assist x2  Speed/Nguyen: Slow;Shuffled  Step Length: Right shortened;Left shortened    Therapeutic Exercises:   10 LAQ at EOB    Pain Ratin/10    Activity Tolerance:   Fair and requires rest breaks    After treatment patient left in no apparent distress:   Bed locked and returned to lowest position, Supine in bed, Call bell within reach, Bed / chair alarm activated, and Side rails x 3      COMMUNICATION/COLLABORATION:   The patients plan of care was discussed with: Occupational therapy assistant and Registered nurse.  Cotx completed for increased safety and A x2.       Tamara Gruber, PT   Time Calculation: 46 mins         Problem: Mobility Impaired (Adult and Pediatric)  Goal: *Acute Goals and Plan of Care (Insert Text)  Description: I with LE HEP x7 days  Supine to sit EOB with CGAx1 x7 days  Sit to stand with min Ax2 x7 days  Stand pivot from bed to chair with min Ax2 x7 days    Future goals TBD as pt progresses with PT    Pt stated goal: to get stronger  Outcome: Progressing Towards Goal

## 2022-12-22 NOTE — DISCHARGE SUMMARY
Hospitalist Discharge Summary     Patient ID:    Myles Ball  [id number]  40 y.o.  1950    Admit date: 12/17/2022    Discharge date : 12/22/2022      Final Diagnoses: Active Problems:    CHF exacerbation ([address] (12/18/2022)      Reason for Hospitalization/Hospital Course:   Myles Ball is a 74 y.o. female with PMH of CHF with pacer, SVT AICD, NICM, diabetes, and hypertension presented to outside ED with chief complaint of shortness of breath for the past week. Unfortunately poor historian and unable to provide detailed history. She reports dyspnea with exertion and LE edema, but denies orthopnea. On admission to the ED denies any chest pain for the past few weeks. From outside ED, BNP > 16582, troponin 131. Chest X-ray showed pulmonary edema. Admitted for further management of CHF exacerbation, non-STEMI, elevated troponins, elevated BNP, shortness of breath and tachycardia. Cardiology consulted. Started on IV Bumex. Monitoring electrolytes and renal function closely for replacement while on diuretics. 2D echo to assess heart function revealing EF 25 to 30% with moderate global hypokinesis. Lower extremity venous Doppler studies were negative for acute findings. Patient responding well to diuresing. PT and OT consulted to evaluate the patient for disposition needs and poor mobility. Case management is working on SNF placement in [facility]. Discharge Medications:   Current Discharge Medication List        START taking these medications    Details   QUEtiapine (SEROquel) 25 mg tablet Take 1 Tablet by mouth two (2) times a day for 30 days. Qty: 60 Tablet, Refills: 0  Start date: 12/22/2022, End date: 1/21/2023      aspirin 81 mg chewable tablet Take 1 Tablet by mouth daily for 30 days.   Qty: 30 Tablet, Refills: 0  Start date: 12/23/2022, End date: 1/22/2023           CONTINUE these medications which have NOT CHANGED    Details   dulaglutide (Trulicity) 1.5 ZR/9.4 mL sub-q pen Trulicity 1.5 CU/3.5 mL subcutaneous pen injector   1.5 MG ONCE A WEEK SUBCUTANEOUSLY 30      hydrALAZINE (APRESOLINE) 10 mg tablet Take 1 Tablet by mouth two (2) times a day. levothyroxine (SYNTHROID) 50 mcg tablet 1 tablet in the morning on an empty stomach      simvastatin (ZOCOR) 20 mg tablet simvastatin 20 mg tablet   TAKE 1 TABLET BY MOUTH EVERY DAY IN THE EVENING 90      Insulin Needles, Disposable, 31 gauge x 3/16\" ndle BD Ultra-Fine Mini Pen Needle 31 gauge x 3/16\"   AS DIRECTED CHECK SUGAR THREE TIMES A DAY THREE TIMES A DAY DIAGNOSIS  E11.22 30      pantoprazole (PROTONIX) 40 mg tablet Take 40 mg by mouth daily. allopurinoL (ZYLOPRIM) 300 mg tablet Take 300 mg by mouth daily. spironolactone (ALDACTONE) 25 mg tablet Take 25 mg by mouth daily. metoprolol succinate (TOPROL-XL) 50 mg XL tablet Take 50 mg by mouth daily. amiodarone (PACERONE) 100 mg tablet Take 100 mg by mouth daily. furosemide (LASIX) 40 mg tablet Take 40 mg by mouth two (2) times a day. captopriL (CAPOTEN) 25 mg tablet captopril 25 mg tablet      insulin degludec (TRESIBA) 200 unit/mL (3 mL) inpn pen Tresiba FlexTouch U-200 insulin 200 unit/mL (3 mL) subcutaneous pen      insulin lispro (HUMALOG) 100 unit/mL kwikpen Humalog KwikPen (U-100) Insulin 100 unit/mL subcutaneous           STOP taking these medications       aspirin delayed-release 81 mg tablet Comments:   Reason for Stopping:         potassium chloride (K-DUR, KLOR-CON M20) 20 mEq tablet Comments:   Reason for Stopping:         sacubitriL-valsartan (Entresto) 24-26 mg tablet Comments:   Reason for Stopping:         traZODone (DESYREL) 50 mg tablet Comments:   Reason for Stopping:         sodium polystyrene (KAYEXALATE) 15 gram/60 mL suspension Comments:   Reason for Stopping:         sodium bicarbonate 650 mg tablet Comments:   Reason for Stopping: Follow up Care:    1. Jean Hillman MD in 1-2 weeks.       Follow-up Information       Follow up With Specialties Details Why Contact Info    Glory Sharma MD Family Medicine Follow up in 2 week(s)  [address]  [facility] [address] [facility] [address]  [phone]                Patient Follow Up Instructions: Activity: Activity as tolerated  Diet:  Cardiac Diet  Wound Care: None needed     Condition at Discharge:  Stable  __________________________________________________________________    Disposition  [place]  ____________________________________________________________________    Code Status:  Full Code  ___________________________________________________________________    Discharge Exam:  Patient seen and examined by me on discharge day. Pertinent Findings:  Gen:    Not in distress  Chest: Clear lungs  CVS:   Regular rhythm. No edema  Abd:  Soft, not distended, not tender  Neuro:  Alert        CONSULTATIONS: Cardiology    Significant Diagnostic Studies:   Recent Results (from the past 24 hour(s))   GLUCOSE, POC    Collection Time: 12/21/22  4:29 PM   Result Value Ref Range    Glucose (POC) 101 (H) 65 - 100 mg/dL    Performed by [facility] + INR    Collection Time: 12/22/22  6:14 AM   Result Value Ref Range    Prothrombin time 15.0 (H) 11.9 - 14.6 sec    INR 1.2 (H) 0.9 - 1.1     CBC WITH AUTOMATED DIFF    Collection Time: 12/22/22  6:14 AM   Result Value Ref Range    WBC 3.7 3.6 - 11.0 K/uL    RBC 3.89 3.80 - 5.20 M/uL    HGB 10.9 (L) 11.5 - 16.0 g/dL    HCT 34.9 (L) 35.0 - 47.0 %    MCV 89.7 80.0 - 99.0 FL    MCH 28.0 26.0 - 34.0 PG    MCHC 31.2 30.0 - 36.5 g/dL    RDW 16.0 (H) 11.5 - 14.5 %    PLATELET 239 613 - 798 K/uL    MPV 10.3 8.9 - 12.9 FL    NRBC 0.0 0.0  WBC    ABSOLUTE NRBC 0.00 0.00 - 0.01 K/uL    NEUTROPHILS 50 32 - 75 %    LYMPHOCYTES 32 12 - 49 %    MONOCYTES 16 (H) 5 - 13 %    EOSINOPHILS 1 0 - 7 %    BASOPHILS 1 0 - 1 %    IMMATURE GRANULOCYTES 0 0 - 0.5 %    ABS. NEUTROPHILS 1.9 1.8 - 8.0 K/UL    ABS. LYMPHOCYTES 1.2 0.8 - 3.5 K/UL    ABS. MONOCYTES 0.6 0.0 - 1.0 K/UL    ABS. EOSINOPHILS 0.1 0.0 - 0.4 K/UL    ABS. BASOPHILS 0.0 0.0 - 0.1 K/UL    ABS. IMM.  GRANS. 0.0 0.00 - 0.04 K/UL    DF AUTOMATED     METABOLIC PANEL, BASIC    Collection Time: 12/22/22  6:14 AM   Result Value Ref Range    Sodium 143 136 - 145 mmol/L    Potassium 4.4 3.5 - 5.1 mmol/L    Chloride 107 97 - 108 mmol/L    CO2 28 21 - 32 mmol/L    Anion gap 8 5 - 15 mmol/L    Glucose 83 65 - 100 mg/dL    BUN 30 (H) 6 - 20 mg/dL    Creatinine 2.14 (H) 0.55 - 1.02 mg/dL    BUN/Creatinine ratio 14 12 - 20      eGFR 24 (L) >60 ml/min/1.73m2    Calcium 9.0 8.5 - 10.1 mg/dL   GLUCOSE, POC    Collection Time: 12/22/22  8:47 AM   Result Value Ref Range    Glucose (POC) 79 65 - 100 mg/dL    Performed by [id number], POC    Collection Time: 12/22/22 11:56 AM   Result Value Ref Range    Glucose (POC) 116 (H) 65 - 100 mg/dL    Performed by [employer]      DUPLEX LOWER EXT VENOUS BILAT   Final Result          Time spent in direct and indirect care including coordination of services: Greater than 35 minutes    Signed:  Shin Plascencia NP  12/22/2022  1:35 PM

## 2022-12-22 NOTE — PROGRESS NOTES
OCCUPATIONAL THERAPY TREATMENT  Patient: Kassandra Hauser (31 y.o. female)  Date: 12/22/2022  Diagnosis: [facility] exacerbation ([facility] [I50.9] <principal problem not specified>      Precautions:    Chart, occupational therapy assessment, plan of care, and goals were reviewed. ASSESSMENT  Pt continues with skilled OT/PT services and is progressing towards goals. Pt received semi-supine in bed upon arrival, AXO x3 and agreeable to EPSTEIN/ KATHI tx at this time. Overall, pt continues to present with deficits in generalized strength/AROM, coordination, bed mobility, static/dynamic sitting and standing balance and functional activity tolerance during performance of ADLs/mobility (see below for objective details and assist levels). Pt tolerated session fair, required rest breaks. Pt had multiple bm's without warning during standing activity. Pt completed anterior perineal bathing (wet wipes) x 3 w/ set up and min a for standing balance at walker. Pt total A for posterior perineal cleaning x3. Pt able to increase functional ambulation in room in preparation for household mobility. Pt following directions well this date. Pt completed UE therex, see grid below for details, to increase strength/ endurance to aid in adl performance. Pt left UE shoulder flex. limited, pt used hand over hand technique to complete therex. Pt left in clean bed, semi supine w/ call bell, phone and side table within reach, w/ no other needs known. Will continue to progress. Recommend d/c to [facility] once medically appropriate. Other factors to consider for discharge: PLOF, time since onset, severity of deficits, decline from functional baseline, and family/ social support         PLAN :  Patient continues to benefit from skilled intervention to address the above impairments. Continue treatment per established plan of care. to address goals.     Recommend with staff: Encourage HEP in prep for ADLs/mobility    Recommend next session: Toileting    Recommendation for discharge: (in order for the patient to meet his/her long term goals)  Elliot Vance    This discharge recommendation:  Has been made in collaboration with the attending provider and/or case management    IF patient discharges home will need the following DME: TBD       SUBJECTIVE:   Patient stated Thank you.     OBJECTIVE DATA SUMMARY:   Cognitive/Behavioral Status:  Neurologic State: Alert  Orientation Level: Oriented to person;Oriented to place;Oriented to time  Cognition: Follows commands    Functional Mobility and Transfers for ADLs:  Bed Mobility:  Rolling: Minimum assistance  Supine to Sit: Moderate assistance  Sit to Supine: Moderate assistance  Scooting: Minimum assistance    Transfers:  Sit to Stand: Minimum assistance; Moderate assistance;Assist x2     Bed to Chair: Minimum assistance;Assist x2    Balance:  Sitting: Intact; With support  Sitting - Static: Good (unsupported)  Sitting - Dynamic: Fair (occasional)  Standing: Impaired; With support  Standing - Static: Constant support; Fair  Standing - Dynamic : Constant support; Fair    ADL Intervention:    Lower Body Bathing  Perineal  : Set-up; Total assistance (dependent) (Total Assist for posterior)  Position Performed: Standing    Upper [address]: Set-up; Minimum  assistance    Toileting  Bladder Hygiene: Set-up  Bowel Hygiene:  Total assistance (dependent)      Therapeutic Exercises:   Exercise Sets Reps AROM AAROM PROM Self PROM Comments   Shoulder flex/ext 1 10 [x] [x] L UE [] []    Elbow flex/ext 1 10 [x] [] [] []        Pain:  0/10    Activity Tolerance:   Fair and requires rest breaks    After treatment patient left in no apparent distress:   Supine in bed, Call bell within reach, Bed / chair alarm activated, and Side rails x 3, bed locked and in lowest position    COMMUNICATION/COLLABORATION:   The patients plan of care was discussed with: Physical therapy assistant and Registered nurse.   PT/OT sessions occurred together for increased safety of pt and clinician. KATHI Longo  Time Calculation: 46 mins     Problem: Self Care Deficits Care Plan (Adult)  Goal: *Acute Goals and Plan of Care (Insert Text)  Description:   FUNCTIONAL STATUS PRIOR TO ADMISSION: Patient was modified independent using a rollator for functional mobility. Patient recently has required assistance for basic and instrumental ADLs. HOME SUPPORT: Patient previously lived with , however, most recently has been living alone with daughters stopping by to assist with ADLs. Occupational Therapy Goals  Initiated 12/20/2022  Patient Goal: Go to rehab and get stronger. 1.  Patient will perform standing grooming with modified independence within 7 day(s). 2.  Patient will perform bathing with modified independence within 7 day(s). 3.  Patient will perform lower body dressing with modified independence within 7 day(s). 4.  Patient will perform toilet transfers with modified independence within 7 day(s). 5.  Patient will perform all aspects of toileting with modified independence within 7 day(s). 6.  Patient will participate in upper extremity therapeutic exercise/activities with independence within 7 day(s). 7.  Patient will utilize energy conservation techniques during functional activities with verbal cues within 7 day(s).   Outcome: Progressing Towards Goal

## 2022-12-22 NOTE — PROGRESS NOTES
Patient has been approved for admission to [facility], they will have a bed available for patient tomorrow 12/23/2022. Patient will d/c to room [facility], nurse report can be called to [phone]. CM contacted patient's daughter, Erum [phone], she is agreeable to d/c and states she will transport patient tomorrow around 12:00 PM.    Medicare pt has received, reviewed, and signed 2nd IM letter informing them of their right to appeal the discharge. Signed copied has been placed on pt bedside chart.

## 2022-12-23 VITALS
SYSTOLIC BLOOD PRESSURE: 134 MMHG | TEMPERATURE: 97.6 F | WEIGHT: 213.85 LBS | OXYGEN SATURATION: 94 % | DIASTOLIC BLOOD PRESSURE: 68 MMHG | HEART RATE: 77 BPM | RESPIRATION RATE: 18 BRPM | HEIGHT: 63 IN | BODY MASS INDEX: 37.89 KG/M2

## 2022-12-23 PROBLEM — R26.2 AMBULATORY DYSFUNCTION: Status: ACTIVE | Noted: 2022-01-01

## 2022-12-23 PROBLEM — R60.0 LOWER EXTREMITY EDEMA: Status: ACTIVE | Noted: 2022-12-23

## 2022-12-23 PROBLEM — F03.90 DEMENTIA (HCC): Status: RESOLVED | Noted: 2022-12-23 | Resolved: 2022-12-23

## 2022-12-23 PROBLEM — F03.90 DEMENTIA (HCC): Status: ACTIVE | Noted: 2022-01-01

## 2022-12-23 PROBLEM — E87.70 FLUID OVERLOAD: Status: ACTIVE | Noted: 2022-01-01

## 2022-12-23 LAB
ABO + RH BLD: NORMAL
APTT PPP: 21.6 SEC (ref 21.2–34.1)
BACTERIA SPEC CULT: NORMAL
BACTERIA SPEC CULT: NORMAL
BLOOD GROUP ANTIBODIES SERPL: NEGATIVE
GLUCOSE BLD STRIP.AUTO-MCNC: 109 MG/DL (ref 65–100)
GLUCOSE BLD STRIP.AUTO-MCNC: 109 MG/DL (ref 65–100)
INR PPP: 1.2 (ref 0.9–1.1)
PERFORMED BY, TECHID: ABNORMAL
PERFORMED BY, TECHID: ABNORMAL
PROTHROMBIN TIME: 15 SEC (ref 11.9–14.6)
SPECIAL REQUESTS,SREQ: NORMAL
SPECIAL REQUESTS,SREQ: NORMAL
SPECIMEN EXP DATE BLD: NORMAL
THERAPEUTIC RANGE,PTTT: NORMAL SEC (ref 82–109)

## 2022-12-23 PROCEDURE — 74011250637 HC RX REV CODE- 250/637: Performed by: INTERNAL MEDICINE

## 2022-12-23 PROCEDURE — 74011000250 HC RX REV CODE- 250: Performed by: INTERNAL MEDICINE

## 2022-12-23 PROCEDURE — 74011250637 HC RX REV CODE- 250/637: Performed by: NURSE PRACTITIONER

## 2022-12-23 PROCEDURE — 82962 GLUCOSE BLOOD TEST: CPT

## 2022-12-23 RX ADMIN — PANTOPRAZOLE SODIUM 40 MG: 40 TABLET, DELAYED RELEASE ORAL at 10:08

## 2022-12-23 RX ADMIN — SPIRONOLACTONE 25 MG: 25 TABLET ORAL at 10:07

## 2022-12-23 RX ADMIN — AMIODARONE HYDROCHLORIDE 100 MG: 200 TABLET ORAL at 10:07

## 2022-12-23 RX ADMIN — POTASSIUM CHLORIDE 40 MEQ: 750 TABLET, FILM COATED, EXTENDED RELEASE ORAL at 10:08

## 2022-12-23 RX ADMIN — METOPROLOL SUCCINATE 50 MG: 50 TABLET, EXTENDED RELEASE ORAL at 10:07

## 2022-12-23 RX ADMIN — ASPIRIN 81 MG CHEWABLE TABLET 81 MG: 81 TABLET CHEWABLE at 10:08

## 2022-12-23 RX ADMIN — BUMETANIDE 1 MG: 0.25 INJECTION, SOLUTION INTRAMUSCULAR; INTRAVENOUS at 10:08

## 2022-12-23 RX ADMIN — SODIUM CHLORIDE, PRESERVATIVE FREE 10 ML: 5 INJECTION INTRAVENOUS at 06:11

## 2022-12-23 RX ADMIN — LISINOPRIL 10 MG: 10 TABLET ORAL at 10:07

## 2022-12-23 RX ADMIN — LEVOTHYROXINE SODIUM 50 MCG: 0.03 TABLET ORAL at 06:11

## 2022-12-23 RX ADMIN — QUETIAPINE FUMARATE 25 MG: 25 TABLET ORAL at 10:07

## 2022-12-23 RX ADMIN — HYDRALAZINE HYDROCHLORIDE 10 MG: 10 TABLET, FILM COATED ORAL at 10:07

## 2022-12-23 NOTE — DISCHARGE SUMMARY
Hospitalist Discharge Summary     Patient ID:    Carolina Vazquez  [id number]  40 y.o.  1950    Admit date: 12/17/2022    Discharge date : 12/23/2022      Final Diagnoses: Active Problems:    CHF exacerbation ([address] (12/18/2022)      Lower extremity edema (12/23/2022)      Fluid overload (12/23/2022)      Ambulatory dysfunction (12/23/2022)      Reason for Hospitalization/Hospital Course:   Carolina Vazquez is a 74 y.o. female with PMH of CHF with pacer, SVT AICD, NICM, diabetes, and hypertension presented to outside ED with chief complaint of shortness of breath for the past week. Unfortunately poor historian and unable to provide detailed history. She reports dyspnea with exertion and LE edema, but denies orthopnea. On admission to the ED denies any chest pain for the past few weeks. From outside ED, BNP > 53264, troponin 131. Chest X-ray showed pulmonary edema. Admitted for further management of CHF exacerbation, non-STEMI, elevated troponins, elevated BNP, shortness of breath and tachycardia. Cardiology consulted. Started on IV Bumex. Monitoring electrolytes and renal function closely for replacement while on diuretics. 2D echo to assess heart function revealing EF 25 to 30% with moderate global hypokinesis. Lower extremity venous Doppler studies were negative for acute findings. Wound care consulted with recommendations due to skin breakdown and poor mobility. Patient responding well to diuresing. PT and OT consulted to evaluate the patient for disposition needs and poor mobility. Case management is working on SNF placement in [facility]. Discharge Medications:   Current Discharge Medication List        START taking these medications    Details   QUEtiapine (SEROquel) 25 mg tablet Take 1 Tablet by mouth two (2) times a day for 30 days.   Qty: 60 Tablet, Refills: 0  Start date: 12/22/2022, End date: 1/21/2023      aspirin 81 mg chewable tablet Take 1 Tablet by mouth daily for 30 days. Qty: 30 Tablet, Refills: 0  Start date: 12/23/2022, End date: 1/22/2023           CONTINUE these medications which have NOT CHANGED    Details   dulaglutide (Trulicity) 1.5 QI/4.2 mL sub-q pen Trulicity 1.5 SP/8.3 mL subcutaneous pen injector   1.5 MG ONCE A WEEK SUBCUTANEOUSLY 30      hydrALAZINE (APRESOLINE) 10 mg tablet Take 1 Tablet by mouth two (2) times a day. levothyroxine (SYNTHROID) 50 mcg tablet 1 tablet in the morning on an empty stomach      simvastatin (ZOCOR) 20 mg tablet simvastatin 20 mg tablet   TAKE 1 TABLET BY MOUTH EVERY DAY IN THE EVENING 90      Insulin Needles, Disposable, 31 gauge x 3/16\" ndle BD Ultra-Fine Mini Pen Needle 31 gauge x 3/16\"   AS DIRECTED CHECK SUGAR THREE TIMES A DAY THREE TIMES A DAY DIAGNOSIS  E11.22 30      pantoprazole (PROTONIX) 40 mg tablet Take 40 mg by mouth daily. allopurinoL (ZYLOPRIM) 300 mg tablet Take 300 mg by mouth daily. spironolactone (ALDACTONE) 25 mg tablet Take 25 mg by mouth daily. metoprolol succinate (TOPROL-XL) 50 mg XL tablet Take 50 mg by mouth daily. amiodarone (PACERONE) 100 mg tablet Take 100 mg by mouth daily. furosemide (LASIX) 40 mg tablet Take 40 mg by mouth two (2) times a day.       captopriL (CAPOTEN) 25 mg tablet captopril 25 mg tablet      insulin degludec (TRESIBA) 200 unit/mL (3 mL) inpn pen Tresiba FlexTouch U-200 insulin 200 unit/mL (3 mL) subcutaneous pen      insulin lispro (HUMALOG) 100 unit/mL kwikpen Humalog KwikPen (U-100) Insulin 100 unit/mL subcutaneous           STOP taking these medications       aspirin delayed-release 81 mg tablet Comments:   Reason for Stopping:         potassium chloride (K-DUR, KLOR-CON M20) 20 mEq tablet Comments:   Reason for Stopping:         sacubitriL-valsartan (Entresto) 24-26 mg tablet Comments:   Reason for Stopping:         traZODone (DESYREL) 50 mg tablet Comments:   Reason for Stopping:         sodium polystyrene (KAYEXALATE) 15 gram/60 mL suspension Comments:   Reason for Stopping:         sodium bicarbonate 650 mg tablet Comments:   Reason for Stopping: Follow up Care:    1. Jean Hillman MD in 1-2 weeks. Follow-up Information       Follow up With Specialties Details Why Contact Info    Jean Hillman MD Family Medicine Follow up in 2 week(s)  [address]  [address] [place] [address] [place]   [phone]                Patient Follow Up Instructions: Activity: Activity as tolerated  Diet:  Cardiac Diet  Wound Care:   Right heel: Cleanse with NS, apply a thin layer of therahoney to wound bed and cover with foam dressing. Dressing to be changed every other day and PRN for soiling. Left buttocks: Cleanse with remedy barrier wipe and apply a thin layer of zinc paste to area and cover with foam dressing. Dressing to be changed every other day and PRN for soiling. Boot are to be applied daily and kept on at all times while in bed for offloading and to prevent pressure injuries. Patient is to be turned approximately every 2 hours with wedge/pillows at an angle of 30 degrees or more if can be tolerated and not contraindicated. FOB should be elevated to prevent friction/shear from patient sliding down in bed. HOB should be elevated to 30 degrees or less to assist with offloading and pressure reduction to sacrum/coccyx. Condition at Discharge:  Stable  __________________________________________________________________    Disposition  [place]  ____________________________________________________________________    Code Status:  Full Code  ___________________________________________________________________    Discharge Exam:  Patient seen and examined by me on discharge day. Pertinent Findings:  Gen:    Not in distress  Chest: Clear lungs  CVS:   Regular rhythm.   No edema  Abd:  Soft, not distended, not tender  Neuro:  Alert        CONSULTATIONS: Cardiology    Significant Diagnostic Studies: Recent Results (from the past 24 hour(s))   GLUCOSE, POC    Collection Time: 12/22/22  4:00 PM   Result Value Ref Range    Glucose (POC) 110 (H) 65 - 100 mg/dL    Performed by Edgardo Winston    CBC WITH AUTOMATED DIFF    Collection Time: 12/22/22 11:36 PM   Result Value Ref Range    WBC 4.5 3.6 - 11.0 K/uL    RBC 3.87 3.80 - 5.20 M/uL    HGB 11.0 (L) 11.5 - 16.0 g/dL    HCT 34.7 (L) 35.0 - 47.0 %    MCV 89.7 80.0 - 99.0 FL    MCH 28.4 26.0 - 34.0 PG    MCHC 31.7 30.0 - 36.5 g/dL    RDW 16.1 (H) 11.5 - 14.5 %    PLATELET 821 229 - 306 K/uL    MPV 11.0 8.9 - 12.9 FL    NRBC 0.0 0.0  WBC    ABSOLUTE NRBC 0.00 0.00 - 0.01 K/uL    NEUTROPHILS 51 32 - 75 %    LYMPHOCYTES 30 12 - 49 %    MONOCYTES 17 (H) 5 - 13 %    EOSINOPHILS 1 0 - 7 %    BASOPHILS 1 0 - 1 %    IMMATURE GRANULOCYTES 0 0 - 0.5 %    ABS. NEUTROPHILS 2.3 1.8 - 8.0 K/UL    ABS. LYMPHOCYTES 1.4 0.8 - 3.5 K/UL    ABS. MONOCYTES 0.8 0.0 - 1.0 K/UL    ABS. EOSINOPHILS 0.1 0.0 - 0.4 K/UL    ABS. BASOPHILS 0.0 0.0 - 0.1 K/UL    ABS. IMM.  GRANS. 0.0 0.00 - 0.04 K/UL    DF AUTOMATED     TYPE & SCREEN    Collection Time: 12/22/22 11:36 PM   Result Value Ref Range    Crossmatch Expiration 12/25/2022,2359     ABO/Rh(D) [facility] Positive     Antibody screen Negative    PTT    Collection Time: 12/22/22 11:36 PM   Result Value Ref Range    aPTT 21.6 21.2 - 34.1 sec    aPTT, therapeutic range   82 - 109 sec   PROTHROMBIN TIME + INR    Collection Time: 12/22/22 11:36 PM   Result Value Ref Range    Prothrombin time 15.0 (H) 11.9 - 14.6 sec    INR 1.2 (H) 0.9 - 1.1     GLUCOSE, POC    Collection Time: 12/23/22  7:51 AM   Result Value Ref Range    Glucose (POC) 109 (H) 65 - 100 mg/dL    Performed by Gokul Luevano    GLUCOSE, POC    Collection Time: 12/23/22 11:31 AM   Result Value Ref Range    Glucose (POC) 109 (H) 65 - 100 mg/dL    Performed by BARB PATRICK      DUPLEX LOWER EXT VENOUS BILAT   Final Result          Time spent in direct and indirect care including coordination of services: Greater than 35 minutes    Signed:  Bryon Nugent NP  12/23/2022  1:35 PM

## 2022-12-23 NOTE — PROGRESS NOTES
Notified Dr. Sammy Hobbs of blood in pt's stool with a few clots. Received orders to discontinue Lovenox, STAT CBC, typing screen, PT and PTT.

## 2022-12-23 NOTE — PROGRESS NOTES
Patient remains clear to d/c from [facility] to [facility] for SNF, please refer to  note from 12/22/2022 for DC information. Nurse notified.

## 2022-12-24 NOTE — PROGRESS NOTES
Patient discharged and has discharge summary. Patient stable. IV removed. Telemetry box removed and returned to monitor room. Family at bedside and transporting patient to [facility] in [facility].   Discharge plan of care/case management plan validated with provider discharge order

## 2022-12-27 ENCOUNTER — TELEPHONE (OUTPATIENT)
Dept: CASE MANAGEMENT | Age: 72
End: 2022-12-27

## 2022-12-27 NOTE — TELEPHONE ENCOUNTER
Discharge Follow-up call    Patient Discharged on: 12/23/2022    Patient Discharge to: SNF         RN-NN notes patient discharge to SNF. Discharge note/follow-up call not completed due to SNF.

## 2023-01-01 ENCOUNTER — APPOINTMENT (OUTPATIENT)
Dept: VASCULAR SURGERY | Age: 73
DRG: 291 | End: 2023-01-01
Attending: HOSPITALIST
Payer: MEDICARE

## 2023-01-01 ENCOUNTER — HOSPITAL ENCOUNTER (INPATIENT)
Age: 73
LOS: 1 days | DRG: 291 | End: 2023-03-17
Attending: HOSPITALIST | Admitting: HOSPITALIST
Payer: MEDICARE

## 2023-01-01 ENCOUNTER — APPOINTMENT (OUTPATIENT)
Dept: GENERAL RADIOLOGY | Age: 73
DRG: 291 | End: 2023-01-01
Attending: HOSPITALIST
Payer: MEDICARE

## 2023-01-01 ENCOUNTER — APPOINTMENT (OUTPATIENT)
Dept: GENERAL RADIOLOGY | Age: 73
DRG: 291 | End: 2023-01-01
Attending: SURGERY
Payer: MEDICARE

## 2023-01-01 ENCOUNTER — APPOINTMENT (OUTPATIENT)
Dept: CT IMAGING | Age: 73
DRG: 291 | End: 2023-01-01
Attending: EMERGENCY MEDICINE
Payer: MEDICARE

## 2023-01-01 ENCOUNTER — APPOINTMENT (OUTPATIENT)
Dept: GENERAL RADIOLOGY | Age: 73
DRG: 291 | End: 2023-01-01
Attending: EMERGENCY MEDICINE
Payer: MEDICARE

## 2023-01-01 ENCOUNTER — HOSPITAL ENCOUNTER (INPATIENT)
Age: 73
LOS: 1 days | Discharge: ANOTHER HOSPITAL | DRG: 291 | End: 2023-03-16
Attending: EMERGENCY MEDICINE | Admitting: HOSPITALIST
Payer: MEDICARE

## 2023-01-01 VITALS
HEIGHT: 63 IN | TEMPERATURE: 97.7 F | SYSTOLIC BLOOD PRESSURE: 120 MMHG | WEIGHT: 232.7 LBS | DIASTOLIC BLOOD PRESSURE: 67 MMHG | BODY MASS INDEX: 41.23 KG/M2 | HEART RATE: 116 BPM | RESPIRATION RATE: 26 BRPM | OXYGEN SATURATION: 92 %

## 2023-01-01 VITALS
HEIGHT: 63 IN | SYSTOLIC BLOOD PRESSURE: 66 MMHG | OXYGEN SATURATION: 98 % | WEIGHT: 234.13 LBS | RESPIRATION RATE: 23 BRPM | DIASTOLIC BLOOD PRESSURE: 39 MMHG | HEART RATE: 89 BPM | BODY MASS INDEX: 41.48 KG/M2 | TEMPERATURE: 99 F

## 2023-01-01 DIAGNOSIS — I50.9 ACUTE ON CHRONIC CONGESTIVE HEART FAILURE, UNSPECIFIED HEART FAILURE TYPE (HCC): Primary | ICD-10-CM

## 2023-01-01 DIAGNOSIS — R57.9 SHOCK (HCC): Primary | ICD-10-CM

## 2023-01-01 DIAGNOSIS — I46.9 PEA (PULSELESS ELECTRICAL ACTIVITY) (HCC): ICD-10-CM

## 2023-01-01 DIAGNOSIS — Z71.89 GOALS OF CARE, COUNSELING/DISCUSSION: ICD-10-CM

## 2023-01-01 DIAGNOSIS — Z71.89 ADVANCED CARE PLANNING/COUNSELING DISCUSSION: ICD-10-CM

## 2023-01-01 DIAGNOSIS — I50.9 CONGESTIVE HEART FAILURE, UNSPECIFIED HF CHRONICITY, UNSPECIFIED HEART FAILURE TYPE (HCC): ICD-10-CM

## 2023-01-01 DIAGNOSIS — R09.02 HYPOXIA: ICD-10-CM

## 2023-01-01 DIAGNOSIS — Z51.5 PALLIATIVE CARE ENCOUNTER: ICD-10-CM

## 2023-01-01 DIAGNOSIS — Z51.5 COMFORT MEASURES ONLY STATUS: ICD-10-CM

## 2023-01-01 LAB
ABO + RH BLD: NORMAL
ACC. NO. FROM MICRO ORDER, ACCP: ABNORMAL
ACINETOBACTER CALCOACETICUS-BAUMANII COMPLEX, ACBCX: NOT DETECTED
ALBUMIN SERPL-MCNC: 2 G/DL (ref 3.5–5)
ALBUMIN SERPL-MCNC: 2 G/DL (ref 3.5–5)
ALBUMIN SERPL-MCNC: 2.2 G/DL (ref 3.5–5)
ALBUMIN SERPL-MCNC: 2.3 G/DL (ref 3.5–5)
ALBUMIN SERPL-MCNC: 2.4 G/DL (ref 3.5–5)
ALBUMIN/GLOB SERPL: 0.4 (ref 1.1–2.2)
ALBUMIN/GLOB SERPL: 0.5 (ref 1.1–2.2)
ALP SERPL-CCNC: 104 U/L (ref 45–117)
ALP SERPL-CCNC: 134 U/L (ref 45–117)
ALP SERPL-CCNC: 173 U/L (ref 45–117)
ALP SERPL-CCNC: 91 U/L (ref 45–117)
ALT SERPL-CCNC: 12 U/L (ref 12–78)
ALT SERPL-CCNC: 13 U/L (ref 12–78)
ALT SERPL-CCNC: 15 U/L (ref 12–78)
ALT SERPL-CCNC: 16 U/L (ref 12–78)
ANION GAP SERPL CALC-SCNC: 10 MMOL/L (ref 5–15)
ANION GAP SERPL CALC-SCNC: 12 MMOL/L (ref 5–15)
ANION GAP SERPL CALC-SCNC: 13 MMOL/L (ref 5–15)
ANION GAP SERPL CALC-SCNC: 15 MMOL/L (ref 5–15)
ANION GAP SERPL CALC-SCNC: 17 MMOL/L (ref 5–15)
ANION GAP SERPL CALC-SCNC: 18 MMOL/L (ref 5–15)
APPEARANCE UR: CLEAR
APTT PPP: 32.3 SEC (ref 22.1–31)
AST SERPL W P-5'-P-CCNC: 14 U/L (ref 15–37)
AST SERPL-CCNC: 11 U/L (ref 15–37)
AST SERPL-CCNC: 24 U/L (ref 15–37)
AST SERPL-CCNC: 7 U/L (ref 15–37)
ATRIAL RATE: 105 BPM
ATRIAL RATE: 144 BPM
B FRAGILIS DNA BLD POS QL NAA+NON-PROBE: NOT DETECTED
BACTERIA URNS QL MICRO: ABNORMAL /HPF
BASE DEFICIT BLD-SCNC: 16.1 MMOL/L
BASE DEFICIT BLDV-SCNC: 10.5 MMOL/L
BASE DEFICIT BLDV-SCNC: 19.4 MMOL/L
BASOPHILS # BLD: 0 K/UL (ref 0–0.1)
BASOPHILS # BLD: 0 K/UL (ref 0–0.1)
BASOPHILS # BLD: 0 K/UL (ref 0–0.2)
BASOPHILS NFR BLD: 0 % (ref 0–1)
BASOPHILS NFR BLD: 0 % (ref 0–1)
BASOPHILS NFR BLD: 0 % (ref 0–2.5)
BILIRUB SERPL-MCNC: 0.9 MG/DL (ref 0.2–1)
BILIRUB SERPL-MCNC: 1.3 MG/DL (ref 0.2–1)
BILIRUB SERPL-MCNC: 1.3 MG/DL (ref 0.2–1)
BILIRUB SERPL-MCNC: 2.7 MG/DL (ref 0.2–1)
BILIRUB UR QL: NEGATIVE
BIOFIRE COMMENT, BCIDPF: ABNORMAL
BLACTX-M ISLT/SPM QL: NOT DETECTED
BLAIMP ISLT/SPM QL: NOT DETECTED
BLAKPC BLD POS QL NAA+NON-PROBE: NOT DETECTED
BLAVIM ISLT/SPM QL: NOT DETECTED
BLD PROD TYP BPU: NORMAL
BLOOD GROUP ANTIBODIES SERPL: NEGATIVE
BNP SERPL-MCNC: ABNORMAL PG/ML
BPU ID: NORMAL
BUN SERPL-MCNC: 100 MG/DL (ref 6–20)
BUN SERPL-MCNC: 102 MG/DL (ref 6–20)
BUN SERPL-MCNC: 102 MG/DL (ref 6–20)
BUN SERPL-MCNC: 107 MG/DL (ref 6–20)
BUN SERPL-MCNC: 109 MG/DL (ref 6–20)
BUN SERPL-MCNC: 110 MG/DL (ref 6–20)
BUN/CREAT SERPL: 30 (ref 12–20)
BUN/CREAT SERPL: 30 (ref 12–20)
BUN/CREAT SERPL: 31 (ref 12–20)
BUN/CREAT SERPL: 32 (ref 12–20)
BUN/CREAT SERPL: 33 (ref 12–20)
C ALBICANS DNA BLD POS QL NAA+NON-PROBE: NOT DETECTED
C AURIS DNA BLD POS QL NAA+NON-PROBE: NOT DETECTED
C GATTII+NEOFOR DNA BLD POS QL NAA+N-PRB: NOT DETECTED
C GLABRATA DNA BLD POS QL NAA+NON-PROBE: NOT DETECTED
C KRUSEI DNA BLD POS QL NAA+NON-PROBE: NOT DETECTED
C PARAP DNA BLD POS QL NAA+NON-PROBE: NOT DETECTED
C TROPICLS DNA BLD POS QL NAA+NON-PROBE: NOT DETECTED
CA-I BLD-MCNC: 9 MG/DL (ref 8.5–10.1)
CA-I BLD-MCNC: 9.1 MG/DL (ref 8.5–10.1)
CA-I BLD-MCNC: 9.1 MG/DL (ref 8.5–10.1)
CA-I BLD-SCNC: 1.15 MMOL/L (ref 1.12–1.32)
CALCIUM SERPL-MCNC: 7.9 MG/DL (ref 8.5–10.1)
CALCIUM SERPL-MCNC: 8.3 MG/DL (ref 8.5–10.1)
CALCIUM SERPL-MCNC: 8.7 MG/DL (ref 8.5–10.1)
CALCIUM SERPL-MCNC: 8.8 MG/DL (ref 8.5–10.1)
CALCIUM SERPL-MCNC: 9 MG/DL (ref 8.5–10.1)
CALCULATED P AXIS, ECG09: 5 DEGREES
CALCULATED R AXIS, ECG10: -143 DEGREES
CALCULATED R AXIS, ECG10: -83 DEGREES
CALCULATED T AXIS, ECG11: -90 DEGREES
CALCULATED T AXIS, ECG11: 90 DEGREES
CHLORIDE SERPL-SCNC: 110 MMOL/L (ref 97–108)
CHLORIDE SERPL-SCNC: 112 MMOL/L (ref 97–108)
CHLORIDE SERPL-SCNC: 113 MMOL/L (ref 97–108)
CHLORIDE SERPL-SCNC: 119 MMOL/L (ref 97–108)
CHLORIDE SERPL-SCNC: 120 MMOL/L (ref 97–108)
CHLORIDE SERPL-SCNC: 120 MMOL/L (ref 97–108)
CHLORIDE SERPL-SCNC: 121 MMOL/L (ref 97–108)
CHLORIDE SERPL-SCNC: 121 MMOL/L (ref 97–108)
CHLORIDE UR-SCNC: 24 MMOL/L
CK SERPL-CCNC: 20 U/L (ref 26–192)
CO2 SERPL-SCNC: 11 MMOL/L (ref 21–32)
CO2 SERPL-SCNC: 12 MMOL/L (ref 21–32)
CO2 SERPL-SCNC: 12 MMOL/L (ref 21–32)
CO2 SERPL-SCNC: 14 MMOL/L (ref 21–32)
CO2 SERPL-SCNC: 14 MMOL/L (ref 21–32)
CO2 SERPL-SCNC: 17 MMOL/L (ref 21–32)
COLOR UR: ABNORMAL
COMMENT, HOLDF: NORMAL
CREAT SERPL-MCNC: 3.05 MG/DL (ref 0.55–1.02)
CREAT SERPL-MCNC: 3.15 MG/DL (ref 0.55–1.02)
CREAT SERPL-MCNC: 3.29 MG/DL (ref 0.55–1.02)
CREAT SERPL-MCNC: 3.3 MG/DL (ref 0.55–1.02)
CREAT SERPL-MCNC: 3.43 MG/DL (ref 0.55–1.02)
CREAT SERPL-MCNC: 3.47 MG/DL (ref 0.55–1.02)
CREAT SERPL-MCNC: 3.5 MG/DL (ref 0.55–1.02)
CREAT SERPL-MCNC: 3.58 MG/DL (ref 0.55–1.02)
CREAT UR-MCNC: 74.6 MG/DL
CREAT UR-MCNC: 80.37 MG/DL
CROSSMATCH RESULT,%XM: NORMAL
DIAGNOSIS, 93000: NORMAL
DIAGNOSIS, 93000: NORMAL
DIFFERENTIAL METHOD BLD: ABNORMAL
DIFFERENTIAL METHOD BLD: ABNORMAL
E CLOAC COMP DNA BLD POS NAA+NON-PROBE: NOT DETECTED
E COLI DNA BLD POS QL NAA+NON-PROBE: NOT DETECTED
E FAECALIS DNA BLD POS QL NAA+NON-PROBE: NOT DETECTED
E FAECIUM DNA BLD POS QL NAA+NON-PROBE: NOT DETECTED
ECHO EST RA PRESSURE: 15 MMHG
ECHO IVC PROX: 3 CM
ECHO LV EDV A2C: 82 ML
ECHO LV EDV A4C: 83 ML
ECHO LV EDV BP: 83 ML (ref 56–104)
ECHO LV EDV INDEX A4C: 40 ML/M2
ECHO LV EDV INDEX BP: 40 ML/M2
ECHO LV EDV NDEX A2C: 40 ML/M2
ECHO LV EJECTION FRACTION A2C: 20 %
ECHO LV EJECTION FRACTION A4C: 10 %
ECHO LV EJECTION FRACTION BIPLANE: 13 % (ref 55–100)
ECHO LV ESV A2C: 66 ML
ECHO LV ESV A4C: 74 ML
ECHO LV ESV BP: 72 ML (ref 19–49)
ECHO LV ESV INDEX A2C: 32 ML/M2
ECHO LV ESV INDEX A4C: 36 ML/M2
ECHO LV ESV INDEX BP: 35 ML/M2
ECHO RIGHT VENTRICULAR SYSTOLIC PRESSURE (RVSP): 46 MMHG
ECHO RV TAPSE: 1.5 CM (ref 1.7–?)
ECHO TV REGURGITANT MAX VELOCITY: 2.8 M/S
ECHO TV REGURGITANT PEAK GRADIENT: 31 MMHG
ENTEROBACTERALES DNA BLD POS NAA+N-PRB: NOT DETECTED
EOSINOPHIL # BLD: 0 K/UL (ref 0–0.4)
EOSINOPHIL # BLD: 0 K/UL (ref 0–0.4)
EOSINOPHIL # BLD: 0 K/UL (ref 0–0.7)
EOSINOPHIL NFR BLD: 0 % (ref 0.9–2.9)
EOSINOPHIL NFR BLD: 0 % (ref 0–7)
EOSINOPHIL NFR BLD: 0 % (ref 0–7)
ERYTHROCYTE [DISTWIDTH] IN BLOOD BY AUTOMATED COUNT: 16.9 % (ref 11.5–14.5)
ERYTHROCYTE [DISTWIDTH] IN BLOOD BY AUTOMATED COUNT: 17.2 % (ref 11.5–14.5)
ERYTHROCYTE [DISTWIDTH] IN BLOOD BY AUTOMATED COUNT: 17.6 % (ref 11.5–14.5)
ERYTHROCYTE [DISTWIDTH] IN BLOOD BY AUTOMATED COUNT: 18 % (ref 11.5–14.5)
ERYTHROCYTE [DISTWIDTH] IN BLOOD BY AUTOMATED COUNT: 18.1 % (ref 11.5–14.5)
FIBRINOGEN PPP-MCNC: 655 MG/DL (ref 200–475)
FIO2 ON VENT: 100 %
GAS FLOW.O2 O2 DELIVERY SYS: 15 L/MIN
GLOBULIN SER CALC-MCNC: 3.7 G/DL (ref 2–4)
GLOBULIN SER CALC-MCNC: 4.4 G/DL (ref 2–4)
GLOBULIN SER CALC-MCNC: 4.5 G/DL (ref 2–4)
GLOBULIN SER CALC-MCNC: 4.7 G/DL (ref 2–4)
GLUCOSE BLD STRIP.AUTO-MCNC: 135 MG/DL (ref 65–100)
GLUCOSE BLD STRIP.AUTO-MCNC: 148 MG/DL (ref 65–100)
GLUCOSE BLD STRIP.AUTO-MCNC: 164 MG/DL (ref 65–100)
GLUCOSE BLD STRIP.AUTO-MCNC: 177 MG/DL (ref 65–117)
GLUCOSE BLD STRIP.AUTO-MCNC: 182 MG/DL (ref 65–100)
GLUCOSE BLD STRIP.AUTO-MCNC: 232 MG/DL (ref 65–117)
GLUCOSE BLD STRIP.AUTO-MCNC: 270 MG/DL (ref 65–117)
GLUCOSE SERPL-MCNC: 119 MG/DL (ref 65–100)
GLUCOSE SERPL-MCNC: 130 MG/DL (ref 65–100)
GLUCOSE SERPL-MCNC: 152 MG/DL (ref 65–100)
GLUCOSE SERPL-MCNC: 168 MG/DL (ref 65–100)
GLUCOSE SERPL-MCNC: 172 MG/DL (ref 65–100)
GLUCOSE SERPL-MCNC: 226 MG/DL (ref 65–100)
GLUCOSE SERPL-MCNC: 244 MG/DL (ref 65–100)
GLUCOSE SERPL-MCNC: 280 MG/DL (ref 65–100)
GLUCOSE UR STRIP.AUTO-MCNC: NEGATIVE MG/DL
GP B STREP DNA BLD POS QL NAA+NON-PROBE: NOT DETECTED
HAEM INFLU DNA BLD POS QL NAA+NON-PROBE: NOT DETECTED
HCO3 BLD-SCNC: 12.1 MMOL/L (ref 22–26)
HCO3 BLDV-SCNC: 14 MMOL/L (ref 23–28)
HCO3 BLDV-SCNC: 8.7 MMOL/L (ref 23–28)
HCT VFR BLD AUTO: 23.5 % (ref 36–46)
HCT VFR BLD AUTO: 23.7 % (ref 35–47)
HCT VFR BLD AUTO: 24.4 % (ref 36–46)
HCT VFR BLD AUTO: 26.4 % (ref 35–47)
HCT VFR BLD AUTO: 26.6 % (ref 36–46)
HCT VFR BLD AUTO: 31 % (ref 35–47)
HGB BLD-MCNC: 7.2 G/DL (ref 11.5–16)
HGB BLD-MCNC: 7.4 G/DL (ref 13.5–17.5)
HGB BLD-MCNC: 7.7 G/DL (ref 13.5–17.5)
HGB BLD-MCNC: 8.2 G/DL (ref 11.5–16)
HGB BLD-MCNC: 8.6 G/DL (ref 13.5–17.5)
HGB BLD-MCNC: 9.4 G/DL (ref 11.5–16)
HGB UR QL STRIP: NEGATIVE
IMM GRANULOCYTES # BLD AUTO: 0 K/UL
IMM GRANULOCYTES # BLD AUTO: 0 K/UL
IMM GRANULOCYTES NFR BLD AUTO: 0 %
IMM GRANULOCYTES NFR BLD AUTO: 0 %
INR PPP: 1.6 (ref 0.9–1.1)
INR PPP: 1.6 (ref 0.9–1.1)
IRON SATN MFR SERPL: 6 % (ref 20–50)
IRON SERPL-MCNC: 14 UG/DL (ref 35–150)
K OXYTOCA DNA BLD POS QL NAA+NON-PROBE: NOT DETECTED
KETONES UR QL STRIP.AUTO: NEGATIVE MG/DL
KLEBSIELLA SP DNA BLD POS QL NAA+NON-PRB: NOT DETECTED
KLEBSIELLA SP DNA BLD POS QL NAA+NON-PRB: NOT DETECTED
L MONOCYTOG DNA BLD POS QL NAA+NON-PROBE: NOT DETECTED
LACTATE SERPL-SCNC: 1.8 MMOL/L (ref 0.4–2)
LACTATE SERPL-SCNC: 2.9 MMOL/L (ref 0.4–2)
LACTATE SERPL-SCNC: 3.1 MMOL/L (ref 0.4–2)
LACTATE SERPL-SCNC: 4.4 MMOL/L (ref 0.4–2)
LEUKOCYTE ESTERASE UR QL STRIP.AUTO: NEGATIVE
LIPASE SERPL-CCNC: 40 U/L (ref 73–393)
LYMPHOCYTES # BLD: 0.1 K/UL (ref 0.8–3.5)
LYMPHOCYTES # BLD: 0.3 K/UL (ref 0.8–3.5)
LYMPHOCYTES # BLD: 0.3 K/UL (ref 1–4.8)
LYMPHOCYTES NFR BLD: 1 % (ref 12–49)
LYMPHOCYTES NFR BLD: 10 % (ref 12–49)
LYMPHOCYTES NFR BLD: 5 % (ref 20.5–51.1)
MAGNESIUM SERPL-MCNC: 2 MG/DL (ref 1.6–2.4)
MAGNESIUM SERPL-MCNC: 2 MG/DL (ref 1.6–2.4)
MAGNESIUM SERPL-MCNC: 2.1 MG/DL (ref 1.6–2.4)
MAGNESIUM SERPL-MCNC: 2.2 MG/DL (ref 1.6–2.4)
MAGNESIUM SERPL-MCNC: 2.2 MG/DL (ref 1.6–2.4)
MCH RBC QN AUTO: 27.2 PG (ref 26–34)
MCH RBC QN AUTO: 27.4 PG (ref 26–34)
MCH RBC QN AUTO: 27.5 PG (ref 26–34)
MCH RBC QN AUTO: 28 PG (ref 31–34)
MCH RBC QN AUTO: 28 PG (ref 31–34)
MCHC RBC AUTO-ENTMCNC: 30.3 G/DL (ref 30–36.5)
MCHC RBC AUTO-ENTMCNC: 30.4 G/DL (ref 30–36.5)
MCHC RBC AUTO-ENTMCNC: 31.1 G/DL (ref 30–36.5)
MCHC RBC AUTO-ENTMCNC: 31.7 G/DL (ref 31–36)
MCHC RBC AUTO-ENTMCNC: 32.3 G/DL (ref 31–36)
MCV RBC AUTO: 86.8 FL (ref 80–100)
MCV RBC AUTO: 87.4 FL (ref 80–99)
MCV RBC AUTO: 88.1 FL (ref 80–100)
MCV RBC AUTO: 90.4 FL (ref 80–99)
MCV RBC AUTO: 90.5 FL (ref 80–99)
METAMYELOCYTES NFR BLD MANUAL: 2 %
MONOCYTES # BLD: 0.4 K/UL (ref 0.2–2.4)
MONOCYTES # BLD: 0.4 K/UL (ref 0–1)
MONOCYTES # BLD: 0.5 K/UL (ref 0–1)
MONOCYTES NFR BLD: 12 % (ref 5–13)
MONOCYTES NFR BLD: 5 % (ref 5–13)
MONOCYTES NFR BLD: 6 % (ref 1.7–9.3)
N MEN DNA BLD POS QL NAA+NON-PROBE: NOT DETECTED
NDM (CARBAPENEMASE RESISTANT GENE), NDM: NOT DETECTED
NEUTS BAND NFR BLD MANUAL: 4 % (ref 0–6)
NEUTS SEG # BLD: 10.3 K/UL (ref 1.8–8)
NEUTS SEG # BLD: 2.5 K/UL (ref 1.8–8)
NEUTS SEG # BLD: 6.1 K/UL (ref 1.8–7.7)
NEUTS SEG NFR BLD: 76 % (ref 32–75)
NEUTS SEG NFR BLD: 89 % (ref 42–75)
NEUTS SEG NFR BLD: 90 % (ref 32–75)
NITRITE UR QL STRIP.AUTO: NEGATIVE
NRBC # BLD: 0.25 K/UL
NRBC # BLD: 0.38 K/UL
NRBC # BLD: 0.96 K/UL (ref 0–0.01)
NRBC # BLD: 0.98 K/UL (ref 0–0.01)
NRBC # BLD: 1.92 K/UL (ref 0–0.01)
NRBC BLD-RTO: 17.7 PER 100 WBC
NRBC BLD-RTO: 29.1 PER 100 WBC
NRBC BLD-RTO: 3.6 PER 100 WBC
NRBC BLD-RTO: 31.9 PER 100 WBC
NRBC BLD-RTO: 5.5 PER 100 WBC
P AERUGINOSA DNA BLD POS NAA+NON-PROBE: DETECTED
P-R INTERVAL, ECG05: 176 MS
P-R INTERVAL, ECG05: 77 MS
PCO2 BLD: 36.6 MMHG (ref 35–45)
PCO2 BLDV: 27 MMHG (ref 41–51)
PCO2 BLDV: 28.7 MMHG (ref 41–51)
PERFORMED BY, TECHID: ABNORMAL
PH BLD: 7.13 (ref 7.35–7.45)
PH BLDV: 7.09 (ref 7.32–7.42)
PH BLDV: 7.33 (ref 7.32–7.42)
PH UR STRIP: 5.5 (ref 5–8)
PHOSPHATE SERPL-MCNC: 5.7 MG/DL (ref 2.6–4.7)
PHOSPHATE SERPL-MCNC: 5.8 MG/DL (ref 2.6–4.7)
PLATELET # BLD AUTO: 131 K/UL (ref 150–400)
PLATELET # BLD AUTO: 152 K/UL (ref 150–400)
PLATELET # BLD AUTO: 155 K/UL (ref 150–400)
PLATELET # BLD AUTO: 162 K/UL (ref 150–400)
PLATELET # BLD AUTO: 200 K/UL (ref 150–400)
PMV BLD AUTO: 11.7 FL (ref 8.9–12.9)
PMV BLD AUTO: 11.8 FL (ref 8.9–12.9)
PMV BLD AUTO: 12.1 FL (ref 8.9–12.9)
PMV BLD AUTO: 8.3 FL (ref 6.5–11.5)
PMV BLD AUTO: 9.3 FL (ref 6.5–11.5)
PO2 BLD: 88 MMHG (ref 80–100)
PO2 BLDV: 50 MMHG (ref 25–40)
PO2 BLDV: 54 MMHG (ref 25–40)
POTASSIUM SERPL-SCNC: 3.9 MMOL/L (ref 3.5–5.1)
POTASSIUM SERPL-SCNC: 4.2 MMOL/L (ref 3.5–5.1)
POTASSIUM SERPL-SCNC: 4.3 MMOL/L (ref 3.5–5.1)
POTASSIUM SERPL-SCNC: 4.3 MMOL/L (ref 3.5–5.1)
POTASSIUM SERPL-SCNC: 4.4 MMOL/L (ref 3.5–5.1)
POTASSIUM SERPL-SCNC: 4.5 MMOL/L (ref 3.5–5.1)
POTASSIUM SERPL-SCNC: 4.5 MMOL/L (ref 3.5–5.1)
POTASSIUM SERPL-SCNC: 5 MMOL/L (ref 3.5–5.1)
PROT SERPL-MCNC: 5.7 G/DL (ref 6.4–8.2)
PROT SERPL-MCNC: 6.5 G/DL (ref 6.4–8.2)
PROT SERPL-MCNC: 6.6 G/DL (ref 6.4–8.2)
PROT SERPL-MCNC: 7.1 G/DL (ref 6.4–8.2)
PROT UR STRIP-MCNC: 30 MG/DL
PROT UR-MCNC: 192 MG/DL (ref 0–11.9)
PROT UR-MCNC: 63 MG/DL (ref 0–11.9)
PROT/CREAT UR-RTO: 0.8
PROT/CREAT UR-RTO: 2.6
PROTEUS SP DNA BLD POS QL NAA+NON-PROBE: NOT DETECTED
PROTHROMBIN TIME: 16.6 SEC (ref 9–11.1)
PROTHROMBIN TIME: 18.7 SEC (ref 11.9–14.6)
Q-T INTERVAL, ECG07: 278 MS
Q-T INTERVAL, ECG07: 419 MS
QRS DURATION, ECG06: 134 MS
QRS DURATION, ECG06: 180 MS
QTC CALCULATION (BEZET), ECG08: 430 MS
QTC CALCULATION (BEZET), ECG08: 565 MS
RBC # BLD AUTO: 2.62 M/UL (ref 3.8–5.2)
RBC # BLD AUTO: 2.66 M/UL (ref 4.5–5.9)
RBC # BLD AUTO: 3.02 M/UL (ref 3.8–5.2)
RBC # BLD AUTO: 3.07 M/UL (ref 4.5–5.9)
RBC # BLD AUTO: 3.43 M/UL (ref 3.8–5.2)
RBC #/AREA URNS HPF: ABNORMAL /HPF (ref 0–3)
RBC MORPH BLD: ABNORMAL
RESISTANT GENE SPACE, REGENE: ABNORMAL
S AUREUS DNA BLD POS QL NAA+NON-PROBE: NOT DETECTED
S AUREUS+CONS DNA BLD POS NAA+NON-PROBE: NOT DETECTED
S EPIDERMIDIS DNA BLD POS QL NAA+NON-PRB: NOT DETECTED
S LUGDUNENSIS DNA BLD POS QL NAA+NON-PRB: NOT DETECTED
S MALTOPHILIA DNA BLD POS QL NAA+NON-PRB: NOT DETECTED
S MARCESCENS DNA BLD POS NAA+NON-PROBE: NOT DETECTED
S PNEUM DNA BLD POS QL NAA+NON-PROBE: NOT DETECTED
S PYO DNA BLD POS QL NAA+NON-PROBE: NOT DETECTED
SALMONELLA DNA BLD POS QL NAA+NON-PROBE: NOT DETECTED
SAMPLES BEING HELD,HOLD: NORMAL
SAO2 % BLD: 93.2 % (ref 92–97)
SAO2 % BLDV: 75.6 % (ref 65–88)
SAO2 % BLDV: 83 % (ref 65–88)
SERVICE CMNT-IMP: ABNORMAL
SODIUM SERPL-SCNC: 143 MMOL/L (ref 136–145)
SODIUM SERPL-SCNC: 143 MMOL/L (ref 136–145)
SODIUM SERPL-SCNC: 145 MMOL/L (ref 136–145)
SODIUM SERPL-SCNC: 146 MMOL/L (ref 136–145)
SODIUM UR-SCNC: 13 MMOL/L
SP GR UR REFRACTOMETRY: 1.01 (ref 1–1.03)
SPECIMEN EXP DATE BLD: NORMAL
SPECIMEN SITE: ABNORMAL
SPECIMEN TYPE: ABNORMAL
SPECIMEN TYPE: ABNORMAL
STATUS OF UNIT,%ST: NORMAL
STREPTOCOCCUS DNA BLD POS NAA+NON-PROBE: NOT DETECTED
T4 FREE SERPL-MCNC: 1.2 NG/DL (ref 0.8–1.5)
THERAPEUTIC RANGE,PTTT: ABNORMAL SECS (ref 58–77)
TIBC SERPL-MCNC: 228 UG/DL (ref 250–450)
TRANSFUSION STATUS PATIENT QL: NORMAL
TROPONIN I SERPL HS-MCNC: 58 NG/L (ref 0–37)
TROPONIN I SERPL HS-MCNC: 80 NG/L (ref 0–51)
TROPONIN I SERPL HS-MCNC: 81 NG/L (ref 0–51)
TSH SERPL DL<=0.05 MIU/L-ACNC: 6.14 UIU/ML (ref 0.36–3.74)
UNIT DIVISION, %UDIV: 0
UROBILINOGEN UR QL STRIP.AUTO: 0.2 EU/DL (ref 0.2–1)
VENTILATION MODE VENT: ABNORMAL
VENTRICULAR RATE, ECG03: 109 BPM
VENTRICULAR RATE, ECG03: 144 BPM
WBC # BLD AUTO: 10.9 K/UL (ref 3.6–11)
WBC # BLD AUTO: 3.1 K/UL (ref 3.6–11)
WBC # BLD AUTO: 3.3 K/UL (ref 3.6–11)
WBC # BLD AUTO: 6.9 K/UL (ref 4.4–11.3)
WBC # BLD AUTO: 6.9 K/UL (ref 4.4–11.3)
WBC URNS QL MICRO: ABNORMAL /HPF (ref 0–5)

## 2023-01-01 PROCEDURE — 74011250636 HC RX REV CODE- 250/636: Performed by: HOSPITALIST

## 2023-01-01 PROCEDURE — 99291 CRITICAL CARE FIRST HOUR: CPT | Performed by: INTERNAL MEDICINE

## 2023-01-01 PROCEDURE — 74011250636 HC RX REV CODE- 250/636: Performed by: INTERNAL MEDICINE

## 2023-01-01 PROCEDURE — 82803 BLOOD GASES ANY COMBINATION: CPT

## 2023-01-01 PROCEDURE — 71045 X-RAY EXAM CHEST 1 VIEW: CPT

## 2023-01-01 PROCEDURE — 85025 COMPLETE CBC W/AUTO DIFF WBC: CPT

## 2023-01-01 PROCEDURE — 87186 SC STD MICRODIL/AGAR DIL: CPT

## 2023-01-01 PROCEDURE — P9047 ALBUMIN (HUMAN), 25%, 50ML: HCPCS | Performed by: HOSPITALIST

## 2023-01-01 PROCEDURE — 74011000250 HC RX REV CODE- 250: Performed by: HOSPITALIST

## 2023-01-01 PROCEDURE — 94002 VENT MGMT INPAT INIT DAY: CPT

## 2023-01-01 PROCEDURE — 93005 ELECTROCARDIOGRAM TRACING: CPT

## 2023-01-01 PROCEDURE — 74011000258 HC RX REV CODE- 258: Performed by: STUDENT IN AN ORGANIZED HEALTH CARE EDUCATION/TRAINING PROGRAM

## 2023-01-01 PROCEDURE — 74011250636 HC RX REV CODE- 250/636: Performed by: NURSE PRACTITIONER

## 2023-01-01 PROCEDURE — 74011000250 HC RX REV CODE- 250: Performed by: NURSE PRACTITIONER

## 2023-01-01 PROCEDURE — 82962 GLUCOSE BLOOD TEST: CPT

## 2023-01-01 PROCEDURE — 74011000258 HC RX REV CODE- 258: Performed by: HOSPITALIST

## 2023-01-01 PROCEDURE — 84100 ASSAY OF PHOSPHORUS: CPT

## 2023-01-01 PROCEDURE — 80053 COMPREHEN METABOLIC PANEL: CPT

## 2023-01-01 PROCEDURE — 85610 PROTHROMBIN TIME: CPT

## 2023-01-01 PROCEDURE — 83690 ASSAY OF LIPASE: CPT

## 2023-01-01 PROCEDURE — 84443 ASSAY THYROID STIM HORMONE: CPT

## 2023-01-01 PROCEDURE — 74176 CT ABD & PELVIS W/O CONTRAST: CPT

## 2023-01-01 PROCEDURE — 85027 COMPLETE CBC AUTOMATED: CPT

## 2023-01-01 PROCEDURE — 03HY32Z INSERTION OF MONITORING DEVICE INTO UPPER ARTERY, PERCUTANEOUS APPROACH: ICD-10-PCS | Performed by: EMERGENCY MEDICINE

## 2023-01-01 PROCEDURE — 74011250636 HC RX REV CODE- 250/636: Performed by: EMERGENCY MEDICINE

## 2023-01-01 PROCEDURE — 84300 ASSAY OF URINE SODIUM: CPT

## 2023-01-01 PROCEDURE — G0378 HOSPITAL OBSERVATION PER HR: HCPCS

## 2023-01-01 PROCEDURE — C9113 INJ PANTOPRAZOLE SODIUM, VIA: HCPCS | Performed by: HOSPITALIST

## 2023-01-01 PROCEDURE — 84484 ASSAY OF TROPONIN QUANT: CPT

## 2023-01-01 PROCEDURE — 83880 ASSAY OF NATRIURETIC PEPTIDE: CPT

## 2023-01-01 PROCEDURE — 94760 N-INVAS EAR/PLS OXIMETRY 1: CPT

## 2023-01-01 PROCEDURE — 85018 HEMOGLOBIN: CPT

## 2023-01-01 PROCEDURE — 93308 TTE F-UP OR LMTD: CPT

## 2023-01-01 PROCEDURE — 82436 ASSAY OF URINE CHLORIDE: CPT

## 2023-01-01 PROCEDURE — 87040 BLOOD CULTURE FOR BACTERIA: CPT

## 2023-01-01 PROCEDURE — 83605 ASSAY OF LACTIC ACID: CPT

## 2023-01-01 PROCEDURE — 83735 ASSAY OF MAGNESIUM: CPT

## 2023-01-01 PROCEDURE — 02HV33Z INSERTION OF INFUSION DEVICE INTO SUPERIOR VENA CAVA, PERCUTANEOUS APPROACH: ICD-10-PCS | Performed by: SURGERY

## 2023-01-01 PROCEDURE — 74011250636 HC RX REV CODE- 250/636: Performed by: SURGERY

## 2023-01-01 PROCEDURE — 74011250636 HC RX REV CODE- 250/636

## 2023-01-01 PROCEDURE — 86923 COMPATIBILITY TEST ELECTRIC: CPT

## 2023-01-01 PROCEDURE — 74011250637 HC RX REV CODE- 250/637: Performed by: HOSPITALIST

## 2023-01-01 PROCEDURE — 74011000258 HC RX REV CODE- 258: Performed by: EMERGENCY MEDICINE

## 2023-01-01 PROCEDURE — 36415 COLL VENOUS BLD VENIPUNCTURE: CPT

## 2023-01-01 PROCEDURE — 83540 ASSAY OF IRON: CPT

## 2023-01-01 PROCEDURE — 96375 TX/PRO/DX INJ NEW DRUG ADDON: CPT

## 2023-01-01 PROCEDURE — 74011000250 HC RX REV CODE- 250: Performed by: EMERGENCY MEDICINE

## 2023-01-01 PROCEDURE — 96374 THER/PROPH/DIAG INJ IV PUSH: CPT

## 2023-01-01 PROCEDURE — 87077 CULTURE AEROBIC IDENTIFY: CPT

## 2023-01-01 PROCEDURE — P9045 ALBUMIN (HUMAN), 5%, 250 ML: HCPCS

## 2023-01-01 PROCEDURE — 74011636637 HC RX REV CODE- 636/637: Performed by: HOSPITALIST

## 2023-01-01 PROCEDURE — 36430 TRANSFUSION BLD/BLD COMPNT: CPT

## 2023-01-01 PROCEDURE — 99285 EMERGENCY DEPT VISIT HI MDM: CPT

## 2023-01-01 PROCEDURE — 65270000029 HC RM PRIVATE

## 2023-01-01 PROCEDURE — 74011000250 HC RX REV CODE- 250: Performed by: SURGERY

## 2023-01-01 PROCEDURE — 86900 BLOOD TYPING SEROLOGIC ABO: CPT

## 2023-01-01 PROCEDURE — 71250 CT THORAX DX C-: CPT

## 2023-01-01 PROCEDURE — P9016 RBC LEUKOCYTES REDUCED: HCPCS

## 2023-01-01 PROCEDURE — 80048 BASIC METABOLIC PNL TOTAL CA: CPT

## 2023-01-01 PROCEDURE — 87150 DNA/RNA AMPLIFIED PROBE: CPT

## 2023-01-01 PROCEDURE — 84439 ASSAY OF FREE THYROXINE: CPT

## 2023-01-01 PROCEDURE — 74011250636 HC RX REV CODE- 250/636: Performed by: STUDENT IN AN ORGANIZED HEALTH CARE EDUCATION/TRAINING PROGRAM

## 2023-01-01 PROCEDURE — 82550 ASSAY OF CK (CPK): CPT

## 2023-01-01 PROCEDURE — 99223 1ST HOSP IP/OBS HIGH 75: CPT | Performed by: NURSE PRACTITIONER

## 2023-01-01 PROCEDURE — 81001 URINALYSIS AUTO W/SCOPE: CPT

## 2023-01-01 PROCEDURE — 77010033678 HC OXYGEN DAILY

## 2023-01-01 PROCEDURE — 84156 ASSAY OF PROTEIN URINE: CPT

## 2023-01-01 PROCEDURE — 0BH17EZ INSERTION OF ENDOTRACHEAL AIRWAY INTO TRACHEA, VIA NATURAL OR ARTIFICIAL OPENING: ICD-10-PCS | Performed by: SURGERY

## 2023-01-01 PROCEDURE — 65610000003 HC RM ICU SURGICAL

## 2023-01-01 PROCEDURE — 74018 RADEX ABDOMEN 1 VIEW: CPT

## 2023-01-01 PROCEDURE — 31500 INSERT EMERGENCY AIRWAY: CPT

## 2023-01-01 PROCEDURE — 99223 1ST HOSP IP/OBS HIGH 75: CPT | Performed by: INTERNAL MEDICINE

## 2023-01-01 PROCEDURE — 80069 RENAL FUNCTION PANEL: CPT

## 2023-01-01 PROCEDURE — 94003 VENT MGMT INPAT SUBQ DAY: CPT

## 2023-01-01 PROCEDURE — 74011000258 HC RX REV CODE- 258: Performed by: SURGERY

## 2023-01-01 PROCEDURE — 5A1945Z RESPIRATORY VENTILATION, 24-96 CONSECUTIVE HOURS: ICD-10-PCS | Performed by: SURGERY

## 2023-01-01 RX ORDER — SODIUM CHLORIDE 9 MG/ML
250 INJECTION, SOLUTION INTRAVENOUS AS NEEDED
Status: DISCONTINUED | OUTPATIENT
Start: 2023-01-01 | End: 2023-01-01 | Stop reason: HOSPADM

## 2023-01-01 RX ORDER — RISPERIDONE 1 MG/1
1 TABLET, FILM COATED ORAL
Status: DISCONTINUED | OUTPATIENT
Start: 2023-01-01 | End: 2023-01-01 | Stop reason: HOSPADM

## 2023-01-01 RX ORDER — IBUPROFEN 200 MG
4 TABLET ORAL AS NEEDED
Status: DISCONTINUED | OUTPATIENT
Start: 2023-01-01 | End: 2023-01-01 | Stop reason: HOSPADM

## 2023-01-01 RX ORDER — PHENYLEPHRINE HCL IN 0.9% NACL 0.4MG/10ML
400 SYRINGE (ML) INTRAVENOUS ONCE
Status: ACTIVE | OUTPATIENT
Start: 2023-01-01 | End: 2023-01-01

## 2023-01-01 RX ORDER — CALCIUM GLUCONATE 20 MG/ML
2 INJECTION, SOLUTION INTRAVENOUS ONCE
Status: COMPLETED | OUTPATIENT
Start: 2023-01-01 | End: 2023-01-01

## 2023-01-01 RX ORDER — ATORVASTATIN CALCIUM 10 MG/1
10 TABLET, FILM COATED ORAL DAILY
Status: DISCONTINUED | OUTPATIENT
Start: 2023-01-01 | End: 2023-01-01

## 2023-01-01 RX ORDER — METOPROLOL TARTRATE 5 MG/5ML
10 INJECTION INTRAVENOUS ONCE
Status: DISCONTINUED | OUTPATIENT
Start: 2023-01-01 | End: 2023-01-01 | Stop reason: HOSPADM

## 2023-01-01 RX ORDER — ONDANSETRON 2 MG/ML
4 INJECTION INTRAMUSCULAR; INTRAVENOUS
Status: DISCONTINUED | OUTPATIENT
Start: 2023-01-01 | End: 2023-03-18 | Stop reason: HOSPADM

## 2023-01-01 RX ORDER — POLYETHYLENE GLYCOL 3350 17 G/17G
17 POWDER, FOR SOLUTION ORAL DAILY PRN
Status: DISCONTINUED | OUTPATIENT
Start: 2023-01-01 | End: 2023-01-01 | Stop reason: HOSPADM

## 2023-01-01 RX ORDER — ONDANSETRON 4 MG/1
4 TABLET, ORALLY DISINTEGRATING ORAL
Status: DISCONTINUED | OUTPATIENT
Start: 2023-01-01 | End: 2023-01-01 | Stop reason: HOSPADM

## 2023-01-01 RX ORDER — ROCURONIUM BROMIDE 10 MG/ML
50 INJECTION, SOLUTION INTRAVENOUS
Status: ACTIVE | OUTPATIENT
Start: 2023-01-01 | End: 2023-01-01

## 2023-01-01 RX ORDER — RISPERIDONE 0.5 MG/1
0.5 TABLET, FILM COATED ORAL DAILY
Status: DISCONTINUED | OUTPATIENT
Start: 2023-01-01 | End: 2023-01-01 | Stop reason: HOSPADM

## 2023-01-01 RX ORDER — PANTOPRAZOLE SODIUM 40 MG/1
40 TABLET, DELAYED RELEASE ORAL DAILY
Status: DISCONTINUED | OUTPATIENT
Start: 2023-01-01 | End: 2023-01-01

## 2023-01-01 RX ORDER — ACETAMINOPHEN 325 MG/1
650 TABLET ORAL
Status: DISCONTINUED | OUTPATIENT
Start: 2023-01-01 | End: 2023-03-18 | Stop reason: HOSPADM

## 2023-01-01 RX ORDER — FUROSEMIDE 10 MG/ML
120 INJECTION INTRAMUSCULAR; INTRAVENOUS ONCE
Status: COMPLETED | OUTPATIENT
Start: 2023-01-01 | End: 2023-01-01

## 2023-01-01 RX ORDER — RISPERIDONE 1 MG/1
1 TABLET, FILM COATED ORAL 2 TIMES DAILY
COMMUNITY

## 2023-01-01 RX ORDER — PHENYLEPHRINE HCL IN 0.9% NACL 0.4MG/10ML
SYRINGE (ML) INTRAVENOUS
Status: COMPLETED
Start: 2023-01-01 | End: 2023-01-01

## 2023-01-01 RX ORDER — IBUPROFEN 200 MG
4 TABLET ORAL AS NEEDED
Status: DISCONTINUED | OUTPATIENT
Start: 2023-01-01 | End: 2023-03-18 | Stop reason: HOSPADM

## 2023-01-01 RX ORDER — PHENYLEPHRINE HCL IN 0.9% NACL 0.4MG/10ML
SYRINGE (ML) INTRAVENOUS
Status: DISPENSED
Start: 2023-01-01 | End: 2023-01-01

## 2023-01-01 RX ORDER — METOPROLOL TARTRATE 5 MG/5ML
INJECTION INTRAVENOUS
Status: DISCONTINUED
Start: 2023-01-01 | End: 2023-01-01 | Stop reason: HOSPADM

## 2023-01-01 RX ORDER — INSULIN LISPRO 100 [IU]/ML
INJECTION, SOLUTION INTRAVENOUS; SUBCUTANEOUS
Status: DISCONTINUED | OUTPATIENT
Start: 2023-01-01 | End: 2023-01-01

## 2023-01-01 RX ORDER — ALBUMIN HUMAN 250 G/1000ML
12.5 SOLUTION INTRAVENOUS EVERY 6 HOURS
Status: DISCONTINUED | OUTPATIENT
Start: 2023-01-01 | End: 2023-01-01

## 2023-01-01 RX ORDER — METOPROLOL TARTRATE 5 MG/5ML
5 INJECTION INTRAVENOUS ONCE
Status: COMPLETED | OUTPATIENT
Start: 2023-01-01 | End: 2023-01-01

## 2023-01-01 RX ORDER — ETOMIDATE 2 MG/ML
10 INJECTION INTRAVENOUS ONCE
Status: ACTIVE | OUTPATIENT
Start: 2023-01-01 | End: 2023-01-01

## 2023-01-01 RX ORDER — GUAIFENESIN 100 MG/5ML
81 LIQUID (ML) ORAL DAILY
COMMUNITY

## 2023-01-01 RX ORDER — DEXMEDETOMIDINE HYDROCHLORIDE 4 UG/ML
.1-1.5 INJECTION, SOLUTION INTRAVENOUS
Status: DISCONTINUED | OUTPATIENT
Start: 2023-01-01 | End: 2023-01-01 | Stop reason: SDUPTHER

## 2023-01-01 RX ORDER — SODIUM BICARBONATE 1 MEQ/ML
100 SYRINGE (ML) INTRAVENOUS ONCE
Status: COMPLETED | OUTPATIENT
Start: 2023-01-01 | End: 2023-01-01

## 2023-01-01 RX ORDER — ATORVASTATIN CALCIUM 10 MG/1
10 TABLET, FILM COATED ORAL
Status: DISCONTINUED | OUTPATIENT
Start: 2023-01-01 | End: 2023-01-01 | Stop reason: HOSPADM

## 2023-01-01 RX ORDER — DEXMEDETOMIDINE HYDROCHLORIDE 4 UG/ML
.1-1.5 INJECTION, SOLUTION INTRAVENOUS
Status: DISCONTINUED | OUTPATIENT
Start: 2023-01-01 | End: 2023-03-18 | Stop reason: HOSPADM

## 2023-01-01 RX ORDER — FUROSEMIDE 10 MG/ML
40 INJECTION INTRAMUSCULAR; INTRAVENOUS 2 TIMES DAILY
Status: DISCONTINUED | OUTPATIENT
Start: 2023-01-01 | End: 2023-01-01

## 2023-01-01 RX ORDER — MORPHINE SULFATE 2 MG/ML
0.5 INJECTION, SOLUTION INTRAMUSCULAR; INTRAVENOUS
Status: DISCONTINUED | OUTPATIENT
Start: 2023-01-01 | End: 2023-01-01

## 2023-01-01 RX ORDER — LEVOTHYROXINE SODIUM 50 UG/1
50 TABLET ORAL
Status: DISCONTINUED | OUTPATIENT
Start: 2023-01-01 | End: 2023-03-18 | Stop reason: HOSPADM

## 2023-01-01 RX ORDER — METOPROLOL SUCCINATE 50 MG/1
50 TABLET, EXTENDED RELEASE ORAL DAILY
Status: DISCONTINUED | OUTPATIENT
Start: 2023-01-01 | End: 2023-01-01

## 2023-01-01 RX ORDER — SODIUM CHLORIDE 0.9 % (FLUSH) 0.9 %
5-40 SYRINGE (ML) INJECTION AS NEEDED
Status: DISCONTINUED | OUTPATIENT
Start: 2023-01-01 | End: 2023-01-01 | Stop reason: HOSPADM

## 2023-01-01 RX ORDER — ACETAMINOPHEN 325 MG/1
650 TABLET ORAL
Status: DISCONTINUED | OUTPATIENT
Start: 2023-01-01 | End: 2023-01-01 | Stop reason: HOSPADM

## 2023-01-01 RX ORDER — GLYCOPYRROLATE 0.2 MG/ML
0.2 INJECTION INTRAMUSCULAR; INTRAVENOUS
Status: CANCELLED | OUTPATIENT
Start: 2023-01-01

## 2023-01-01 RX ORDER — LORAZEPAM 2 MG/ML
1 INJECTION INTRAMUSCULAR
Status: CANCELLED | OUTPATIENT
Start: 2023-01-01

## 2023-01-01 RX ORDER — FUROSEMIDE 10 MG/ML
40 INJECTION INTRAMUSCULAR; INTRAVENOUS 2 TIMES DAILY
Status: DISCONTINUED | OUTPATIENT
Start: 2023-01-01 | End: 2023-01-01 | Stop reason: HOSPADM

## 2023-01-01 RX ORDER — SODIUM CHLORIDE 9 MG/ML
8 INJECTION, SOLUTION INTRAVENOUS CONTINUOUS
Status: DISCONTINUED | OUTPATIENT
Start: 2023-01-01 | End: 2023-03-18 | Stop reason: HOSPADM

## 2023-01-01 RX ORDER — PHENYLEPHRINE HCL IN 0.9% NACL 0.4MG/10ML
100-400 SYRINGE (ML) INTRAVENOUS ONCE
Status: COMPLETED | OUTPATIENT
Start: 2023-01-01 | End: 2023-01-01

## 2023-01-01 RX ORDER — ALBUMIN HUMAN 50 G/1000ML
SOLUTION INTRAVENOUS
Status: COMPLETED
Start: 2023-01-01 | End: 2023-01-01

## 2023-01-01 RX ORDER — FENTANYL CITRATE 50 UG/ML
100 INJECTION, SOLUTION INTRAMUSCULAR; INTRAVENOUS
Status: COMPLETED | OUTPATIENT
Start: 2023-01-01 | End: 2023-01-01

## 2023-01-01 RX ORDER — METOPROLOL SUCCINATE 50 MG/1
50 TABLET, EXTENDED RELEASE ORAL DAILY
Status: DISCONTINUED | OUTPATIENT
Start: 2023-01-01 | End: 2023-01-01 | Stop reason: HOSPADM

## 2023-01-01 RX ORDER — ONDANSETRON 4 MG/1
4 TABLET, ORALLY DISINTEGRATING ORAL
Status: DISCONTINUED | OUTPATIENT
Start: 2023-01-01 | End: 2023-03-18 | Stop reason: HOSPADM

## 2023-01-01 RX ORDER — LEVOTHYROXINE SODIUM 50 UG/1
50 TABLET ORAL
Status: DISCONTINUED | OUTPATIENT
Start: 2023-01-01 | End: 2023-01-01 | Stop reason: HOSPADM

## 2023-01-01 RX ORDER — HYDROMORPHONE HYDROCHLORIDE 1 MG/ML
0.5 INJECTION, SOLUTION INTRAMUSCULAR; INTRAVENOUS; SUBCUTANEOUS
Status: CANCELLED | OUTPATIENT
Start: 2023-01-01

## 2023-01-01 RX ORDER — EPINEPHRINE 0.1 MG/ML
INJECTION INTRACARDIAC; INTRAVENOUS
Status: COMPLETED | OUTPATIENT
Start: 2023-01-01 | End: 2023-01-01

## 2023-01-01 RX ORDER — SODIUM BICARBONATE IN D5W 150/1000ML
PLASTIC BAG, INJECTION (ML) INTRAVENOUS CONTINUOUS
Status: DISCONTINUED | OUTPATIENT
Start: 2023-01-01 | End: 2023-01-01 | Stop reason: CLARIF

## 2023-01-01 RX ORDER — ATORVASTATIN CALCIUM 10 MG/1
10 TABLET, FILM COATED ORAL
Status: DISCONTINUED | OUTPATIENT
Start: 2023-01-01 | End: 2023-03-18 | Stop reason: HOSPADM

## 2023-01-01 RX ORDER — SODIUM CHLORIDE 0.9 % (FLUSH) 0.9 %
5-40 SYRINGE (ML) INJECTION EVERY 8 HOURS
Status: DISCONTINUED | OUTPATIENT
Start: 2023-01-01 | End: 2023-01-01 | Stop reason: HOSPADM

## 2023-01-01 RX ORDER — POLYETHYLENE GLYCOL 3350 17 G/17G
17 POWDER, FOR SOLUTION ORAL DAILY PRN
Status: DISCONTINUED | OUTPATIENT
Start: 2023-01-01 | End: 2023-03-18 | Stop reason: HOSPADM

## 2023-01-01 RX ORDER — SODIUM CHLORIDE 0.9 % (FLUSH) 0.9 %
5-40 SYRINGE (ML) INJECTION EVERY 8 HOURS
Status: DISCONTINUED | OUTPATIENT
Start: 2023-01-01 | End: 2023-03-18 | Stop reason: HOSPADM

## 2023-01-01 RX ORDER — SODIUM CHLORIDE 0.9 % (FLUSH) 0.9 %
5-40 SYRINGE (ML) INJECTION AS NEEDED
Status: DISCONTINUED | OUTPATIENT
Start: 2023-01-01 | End: 2023-03-18 | Stop reason: HOSPADM

## 2023-01-01 RX ORDER — SCOLOPAMINE TRANSDERMAL SYSTEM 1 MG/1
1 PATCH, EXTENDED RELEASE TRANSDERMAL
Status: CANCELLED | OUTPATIENT
Start: 2023-01-01

## 2023-01-01 RX ORDER — ONDANSETRON 2 MG/ML
4 INJECTION INTRAMUSCULAR; INTRAVENOUS ONCE
Status: COMPLETED | OUTPATIENT
Start: 2023-01-01 | End: 2023-01-01

## 2023-01-01 RX ORDER — ONDANSETRON 2 MG/ML
4 INJECTION INTRAMUSCULAR; INTRAVENOUS
Status: DISCONTINUED | OUTPATIENT
Start: 2023-01-01 | End: 2023-01-01 | Stop reason: HOSPADM

## 2023-01-01 RX ORDER — NOREPINEPHRINE BITARTRATE/D5W 8 MG/250ML
.5-3 PLASTIC BAG, INJECTION (ML) INTRAVENOUS
Status: DISCONTINUED | OUTPATIENT
Start: 2023-01-01 | End: 2023-03-18 | Stop reason: HOSPADM

## 2023-01-01 RX ORDER — SODIUM CHLORIDE 9 MG/ML
250 INJECTION, SOLUTION INTRAVENOUS AS NEEDED
Status: DISCONTINUED | OUTPATIENT
Start: 2023-01-01 | End: 2023-03-18 | Stop reason: HOSPADM

## 2023-01-01 RX ORDER — DEXTROSE 50 % IN WATER (D50W) INTRAVENOUS SYRINGE
25-50 AS NEEDED
Status: DISCONTINUED | OUTPATIENT
Start: 2023-01-01 | End: 2023-01-01 | Stop reason: HOSPADM

## 2023-01-01 RX ORDER — INSULIN LISPRO 100 [IU]/ML
INJECTION, SOLUTION INTRAVENOUS; SUBCUTANEOUS
Status: DISCONTINUED | OUTPATIENT
Start: 2023-01-01 | End: 2023-01-01 | Stop reason: HOSPADM

## 2023-01-01 RX ORDER — ACETAMINOPHEN 650 MG/1
650 SUPPOSITORY RECTAL
Status: DISCONTINUED | OUTPATIENT
Start: 2023-01-01 | End: 2023-01-01 | Stop reason: HOSPADM

## 2023-01-01 RX ORDER — AMIODARONE HYDROCHLORIDE 200 MG/1
100 TABLET ORAL DAILY
Status: DISCONTINUED | OUTPATIENT
Start: 2023-01-01 | End: 2023-01-01 | Stop reason: HOSPADM

## 2023-01-01 RX ORDER — ACETAMINOPHEN 650 MG/1
650 SUPPOSITORY RECTAL
Status: DISCONTINUED | OUTPATIENT
Start: 2023-01-01 | End: 2023-03-18 | Stop reason: HOSPADM

## 2023-01-01 RX ORDER — CHLORHEXIDINE GLUCONATE 0.12 MG/ML
15 RINSE ORAL EVERY 12 HOURS
Status: DISCONTINUED | OUTPATIENT
Start: 2023-01-01 | End: 2023-03-18 | Stop reason: HOSPADM

## 2023-01-01 RX ORDER — ROCURONIUM BROMIDE 10 MG/ML
INJECTION, SOLUTION INTRAVENOUS
Status: DISPENSED
Start: 2023-01-01 | End: 2023-01-01

## 2023-01-01 RX ORDER — ETOMIDATE 2 MG/ML
INJECTION INTRAVENOUS
Status: DISPENSED
Start: 2023-01-01 | End: 2023-01-01

## 2023-01-01 RX ADMIN — PIPERACILLIN AND TAZOBACTAM 4.5 G: 4; .5 INJECTION, POWDER, FOR SOLUTION INTRAVENOUS at 13:50

## 2023-01-01 RX ADMIN — CALCIUM GLUCONATE 2 G: 20 INJECTION, SOLUTION INTRAVENOUS at 11:59

## 2023-01-01 RX ADMIN — SODIUM BICARBONATE 100 MEQ: 84 INJECTION INTRAVENOUS at 19:00

## 2023-01-01 RX ADMIN — Medication: at 08:52

## 2023-01-01 RX ADMIN — SODIUM CHLORIDE, PRESERVATIVE FREE 10 ML: 5 INJECTION INTRAVENOUS at 21:43

## 2023-01-01 RX ADMIN — EPINEPHRINE 4 MCG/MIN: 1 INJECTION INTRAMUSCULAR; INTRAVENOUS; SUBCUTANEOUS at 07:10

## 2023-01-01 RX ADMIN — DEXMEDETOMIDINE HYDROCHLORIDE 0.5 MCG/KG/HR: 4 INJECTION, SOLUTION INTRAVENOUS at 19:02

## 2023-01-01 RX ADMIN — EPINEPHRINE 1 MG: 0.1 INJECTION INTRACARDIAC; INTRAVENOUS at 17:50

## 2023-01-01 RX ADMIN — ATORVASTATIN CALCIUM 10 MG: 10 TABLET, FILM COATED ORAL at 21:41

## 2023-01-01 RX ADMIN — RISPERIDONE 0.5 MG: 0.5 TABLET ORAL at 16:37

## 2023-01-01 RX ADMIN — VASOPRESSIN 0.04 UNITS/MIN: 20 INJECTION PARENTERAL at 18:30

## 2023-01-01 RX ADMIN — DEXTROSE MONOHYDRATE 150 MG: 50 INJECTION, SOLUTION INTRAVENOUS at 17:50

## 2023-01-01 RX ADMIN — DEXMEDETOMIDINE HYDROCHLORIDE 0.5 MCG/KG/HR: 4 INJECTION, SOLUTION INTRAVENOUS at 01:07

## 2023-01-01 RX ADMIN — CHLORHEXIDINE GLUCONATE 15 ML: 1.2 RINSE ORAL at 21:00

## 2023-01-01 RX ADMIN — METHYLPREDNISOLONE SODIUM SUCCINATE 125 MG: 125 INJECTION, POWDER, FOR SOLUTION INTRAMUSCULAR; INTRAVENOUS at 18:14

## 2023-01-01 RX ADMIN — DEXMEDETOMIDINE HYDROCHLORIDE 0.5 MCG/KG/HR: 4 INJECTION, SOLUTION INTRAVENOUS at 08:03

## 2023-01-01 RX ADMIN — SODIUM CHLORIDE 1000 ML: 9 INJECTION, SOLUTION INTRAVENOUS at 09:11

## 2023-01-01 RX ADMIN — SODIUM CHLORIDE 40 MG: 9 INJECTION, SOLUTION INTRAMUSCULAR; INTRAVENOUS; SUBCUTANEOUS at 21:24

## 2023-01-01 RX ADMIN — SODIUM CHLORIDE 8 ML/HR: 9 INJECTION, SOLUTION INTRAVENOUS at 20:03

## 2023-01-01 RX ADMIN — PHENYLEPHRINE HYDROCHLORIDE 300 MCG/MIN: 10 INJECTION INTRAVENOUS at 15:04

## 2023-01-01 RX ADMIN — EPINEPHRINE 1 MG: 0.1 INJECTION INTRACARDIAC; INTRAVENOUS at 17:45

## 2023-01-01 RX ADMIN — NOREPINEPHRINE BITARTRATE 30 MCG/MIN: 1 INJECTION, SOLUTION, CONCENTRATE INTRAVENOUS at 12:02

## 2023-01-01 RX ADMIN — ALBUMIN (HUMAN) 12.5 G: 0.25 INJECTION, SOLUTION INTRAVENOUS at 11:56

## 2023-01-01 RX ADMIN — VASOPRESSIN 0.04 UNITS/MIN: 20 INJECTION PARENTERAL at 02:08

## 2023-01-01 RX ADMIN — SODIUM CHLORIDE, PRESERVATIVE FREE 10 ML: 5 INJECTION INTRAVENOUS at 21:25

## 2023-01-01 RX ADMIN — DEXTROSE MONOHYDRATE 150 MG: 50 INJECTION, SOLUTION INTRAVENOUS at 22:27

## 2023-01-01 RX ADMIN — VASOPRESSIN 0.04 UNITS/MIN: 20 INJECTION PARENTERAL at 17:17

## 2023-01-01 RX ADMIN — INSULIN LISPRO 2 UNITS: 100 INJECTION, SOLUTION INTRAVENOUS; SUBCUTANEOUS at 16:18

## 2023-01-01 RX ADMIN — AMIODARONE HYDROCHLORIDE 1 MG/MIN: 50 INJECTION, SOLUTION INTRAVENOUS at 16:19

## 2023-01-01 RX ADMIN — SODIUM CHLORIDE, PRESERVATIVE FREE 10 ML: 5 INJECTION INTRAVENOUS at 16:14

## 2023-01-01 RX ADMIN — VASOPRESSIN 0.02 UNITS/MIN: 20 INJECTION PARENTERAL at 09:01

## 2023-01-01 RX ADMIN — FENTANYL CITRATE 100 MCG/HR: 50 INJECTION INTRAVENOUS at 08:02

## 2023-01-01 RX ADMIN — METOPROLOL TARTRATE 5 MG: 5 INJECTION, SOLUTION INTRAVENOUS at 11:01

## 2023-01-01 RX ADMIN — ONDANSETRON 4 MG: 2 INJECTION INTRAMUSCULAR; INTRAVENOUS at 09:11

## 2023-01-01 RX ADMIN — PHENYLEPHRINE HYDROCHLORIDE 300 MCG/MIN: 10 INJECTION INTRAVENOUS at 16:50

## 2023-01-01 RX ADMIN — AMIODARONE HYDROCHLORIDE 1 MG/MIN: 50 INJECTION, SOLUTION INTRAVENOUS at 22:42

## 2023-01-01 RX ADMIN — METOPROLOL TARTRATE 5 MG: 5 INJECTION, SOLUTION INTRAVENOUS at 21:48

## 2023-01-01 RX ADMIN — SODIUM BICARBONATE 100 MEQ: 84 INJECTION INTRAVENOUS at 11:52

## 2023-01-01 RX ADMIN — FENTANYL CITRATE 100 MCG: 50 INJECTION, SOLUTION INTRAMUSCULAR; INTRAVENOUS at 09:11

## 2023-01-01 RX ADMIN — CHLORHEXIDINE GLUCONATE 15 ML: 1.2 RINSE ORAL at 08:51

## 2023-01-01 RX ADMIN — PANTOPRAZOLE SODIUM 40 MG: 40 INJECTION, POWDER, LYOPHILIZED, FOR SOLUTION INTRAVENOUS at 21:41

## 2023-01-01 RX ADMIN — SODIUM BICARBONATE: 84 INJECTION, SOLUTION INTRAVENOUS at 18:25

## 2023-01-01 RX ADMIN — NOREPINEPHRINE BITARTRATE 30 MCG/MIN: 1 INJECTION, SOLUTION, CONCENTRATE INTRAVENOUS at 03:31

## 2023-01-01 RX ADMIN — PHENYLEPHRINE HYDROCHLORIDE 300 MCG/MIN: 10 INJECTION INTRAVENOUS at 18:26

## 2023-01-01 RX ADMIN — ALBUMIN (HUMAN) 12.5 G: 0.25 INJECTION, SOLUTION INTRAVENOUS at 01:07

## 2023-01-01 RX ADMIN — SODIUM CHLORIDE 40 MG: 9 INJECTION, SOLUTION INTRAMUSCULAR; INTRAVENOUS; SUBCUTANEOUS at 08:51

## 2023-01-01 RX ADMIN — RISPERIDONE 1 MG: 1 TABLET ORAL at 21:41

## 2023-01-01 RX ADMIN — NOREPINEPHRINE BITARTRATE 30 MCG/MIN: 1 INJECTION, SOLUTION, CONCENTRATE INTRAVENOUS at 18:04

## 2023-01-01 RX ADMIN — AMIODARONE HYDROCHLORIDE 1 MG/MIN: 50 INJECTION, SOLUTION INTRAVENOUS at 10:36

## 2023-01-01 RX ADMIN — NOREPINEPHRINE BITARTRATE 30 MCG/MIN: 1 INJECTION, SOLUTION, CONCENTRATE INTRAVENOUS at 22:31

## 2023-01-01 RX ADMIN — Medication 5 UNITS: at 12:19

## 2023-01-01 RX ADMIN — Medication: at 11:45

## 2023-01-01 RX ADMIN — EPINEPHRINE 10 MCG/MIN: 1 INJECTION INTRAMUSCULAR; INTRAVENOUS; SUBCUTANEOUS at 18:10

## 2023-01-01 RX ADMIN — Medication 400 MCG: at 20:00

## 2023-01-01 RX ADMIN — ALBUMIN (HUMAN) 12.5 G: 12.5 INJECTION, SOLUTION INTRAVENOUS at 17:50

## 2023-01-01 RX ADMIN — ALBUMIN (HUMAN) 12.5 G: 0.25 INJECTION, SOLUTION INTRAVENOUS at 05:59

## 2023-01-01 RX ADMIN — FUROSEMIDE 40 MG: 10 INJECTION, SOLUTION INTRAMUSCULAR; INTRAVENOUS at 21:41

## 2023-01-01 RX ADMIN — SODIUM CHLORIDE, PRESERVATIVE FREE 10 ML: 5 INJECTION INTRAVENOUS at 06:28

## 2023-01-01 RX ADMIN — SODIUM CHLORIDE, PRESERVATIVE FREE 10 ML: 5 INJECTION INTRAVENOUS at 05:59

## 2023-01-01 RX ADMIN — PHENYLEPHRINE HYDROCHLORIDE 30 MCG/MIN: 10 INJECTION INTRAVENOUS at 10:35

## 2023-01-01 RX ADMIN — SODIUM BICARBONATE: 84 INJECTION, SOLUTION INTRAVENOUS at 08:22

## 2023-01-01 RX ADMIN — AMIODARONE HYDROCHLORIDE 1 MG/MIN: 50 INJECTION, SOLUTION INTRAVENOUS at 04:51

## 2023-01-01 RX ADMIN — FENTANYL CITRATE 100 MCG/HR: 50 INJECTION INTRAVENOUS at 18:52

## 2023-01-01 RX ADMIN — FUROSEMIDE 40 MG: 10 INJECTION, SOLUTION INTRAMUSCULAR; INTRAVENOUS at 16:14

## 2023-01-01 RX ADMIN — Medication 400 MCG: at 19:35

## 2023-01-01 RX ADMIN — NOREPINEPHRINE BITARTRATE 30 MCG/MIN: 1 INJECTION, SOLUTION, CONCENTRATE INTRAVENOUS at 15:49

## 2023-01-01 RX ADMIN — FUROSEMIDE 120 MG: 10 INJECTION, SOLUTION INTRAVENOUS at 08:57

## 2023-01-01 RX ADMIN — Medication 3 UNITS: at 08:51

## 2023-01-01 RX ADMIN — SODIUM CHLORIDE 250 ML: 9 INJECTION, SOLUTION INTRAVENOUS at 12:05

## 2023-01-01 RX ADMIN — PHENYLEPHRINE HYDROCHLORIDE 300 MCG/MIN: 10 INJECTION INTRAVENOUS at 13:18

## 2023-01-01 RX ADMIN — Medication 400 MCG: at 19:41

## 2023-02-01 ENCOUNTER — OFFICE VISIT (OUTPATIENT)
Dept: PODIATRY | Age: 73
End: 2023-02-01
Payer: MEDICARE

## 2023-02-01 VITALS
SYSTOLIC BLOOD PRESSURE: 143 MMHG | DIASTOLIC BLOOD PRESSURE: 86 MMHG | TEMPERATURE: 97.2 F | BODY MASS INDEX: 37.88 KG/M2 | HEART RATE: 74 BPM | HEIGHT: 63 IN

## 2023-02-01 DIAGNOSIS — R60.0 LOWER EXTREMITY EDEMA: ICD-10-CM

## 2023-02-01 DIAGNOSIS — E11.621 DIABETIC ULCER OF RIGHT HEEL ASSOCIATED WITH TYPE 2 DIABETES MELLITUS, WITH MUSCLE INVOLVEMENT WITHOUT EVIDENCE OF NECROSIS (HCC): Primary | ICD-10-CM

## 2023-02-01 DIAGNOSIS — L97.415 DIABETIC ULCER OF RIGHT HEEL ASSOCIATED WITH TYPE 2 DIABETES MELLITUS, WITH MUSCLE INVOLVEMENT WITHOUT EVIDENCE OF NECROSIS (HCC): Primary | ICD-10-CM

## 2023-02-01 PROCEDURE — 1123F ACP DISCUSS/DSCN MKR DOCD: CPT | Performed by: PODIATRIST

## 2023-02-01 PROCEDURE — 1101F PT FALLS ASSESS-DOCD LE1/YR: CPT | Performed by: PODIATRIST

## 2023-02-01 PROCEDURE — G8427 DOCREV CUR MEDS BY ELIG CLIN: HCPCS | Performed by: PODIATRIST

## 2023-02-01 PROCEDURE — G8419 CALC BMI OUT NRM PARAM NOF/U: HCPCS | Performed by: PODIATRIST

## 2023-02-01 PROCEDURE — G8432 DEP SCR NOT DOC, RNG: HCPCS | Performed by: PODIATRIST

## 2023-02-01 PROCEDURE — 11042 DBRDMT SUBQ TIS 1ST 20SQCM/<: CPT | Performed by: PODIATRIST

## 2023-02-01 PROCEDURE — G8536 NO DOC ELDER MAL SCRN: HCPCS | Performed by: PODIATRIST

## 2023-02-01 PROCEDURE — 1090F PRES/ABSN URINE INCON ASSESS: CPT | Performed by: PODIATRIST

## 2023-02-01 PROCEDURE — 3017F COLORECTAL CA SCREEN DOC REV: CPT | Performed by: PODIATRIST

## 2023-02-01 PROCEDURE — G8400 PT W/DXA NO RESULTS DOC: HCPCS | Performed by: PODIATRIST

## 2023-02-01 PROCEDURE — 3046F HEMOGLOBIN A1C LEVEL >9.0%: CPT | Performed by: PODIATRIST

## 2023-02-01 PROCEDURE — G9899 SCRN MAM PERF RSLTS DOC: HCPCS | Performed by: PODIATRIST

## 2023-02-01 PROCEDURE — 2022F DILAT RTA XM EVC RTNOPTHY: CPT | Performed by: PODIATRIST

## 2023-02-01 PROCEDURE — 99203 OFFICE O/P NEW LOW 30 MIN: CPT | Performed by: PODIATRIST

## 2023-02-01 NOTE — PROGRESS NOTES
1. Have you been to the ER, urgent care clinic since your last visit? Hospitalized since your last visit? Yes Where: [facility]    2. Have you seen or consulted any other health care providers outside of the [address] [place] [address] since your last visit? Include any pap smears or colon screening.  No    Chief Complaint   Patient presents with    Wound Check     Right heel open wound/odor/bilateral wound on legs

## 2023-02-01 NOTE — PROGRESS NOTES
[address] [facility] FOOT SURGERY     Patient Name: Becca Cornell    : 1950    Visit Date: 2023    Office Visit Note    Subjective:     Patient is a 74 y.o. female who is being seen in office initial visit for ulcer to right heel and chronic lower extremity edema. Patient presents with daughter. Patient's daughter states that she has had local wound care performed by podiatrist in [facility]. Patient was then admitted to the hospital due to CHF exacerbation was treated by wound care nurse in hospital.  Patient has been able to get a follow-up with her other doctor now presents to clinic for further care of her wounds to the right heel. Patient states she has had ulcer for over a year. Different treatments have consisted of Neosporin, Silvadene, Medihoney. Patient currently has wound care nurse coming to home. Past Medical History:   Diagnosis Date    CHF (congestive heart failure) ([facility] [address] [facility]     Diabetes ([facility] [address] [facility]     Hypertension     Menopause      Past Surgical History:   Procedure Laterality Date    HX IMPLANTABLE CARDIOVERTER DEFIBRILLATOR         History reviewed. No pertinent family history. Social History     Tobacco Use    Smoking status: Never    Smokeless tobacco: Never   Substance Use Topics    Alcohol use: Not on file     Allergies   Allergen Reactions    Contrast Agent [Iodine] Nausea and Vomiting     Prior to Admission medications    Medication Sig Start Date End Date Taking? Authorizing Provider   captopriL (CAPOTEN) 25 mg tablet captopril 25 mg tablet    Raheem Botello MD   dulaglutide (Trulicity) 1.5 SM/9.9 mL sub-q pen Trulicity 1.5 XR/3.6 mL subcutaneous pen injector   1.5 MG ONCE A WEEK SUBCUTANEOUSLY 30    Raheem Botello MD   hydrALAZINE (APRESOLINE) 10 mg tablet Take 1 Tablet by mouth two (2) times a day.  22  Raheem Botello MD   insulin degludec (TRESIBA) 200 unit/mL (3 mL) inpn pen Tresiba FlexTouch U-200 insulin 200 unit/mL (3 mL) subcutaneous pen    Raheem Botello MD levothyroxine (SYNTHROID) 50 mcg tablet 1 tablet in the morning on an empty stomach    Raheem Botello MD   insulin lispro (HUMALOG) 100 unit/mL kwikpen Humalog KwikPen (U-100) Insulin 100 unit/mL subcutaneous    Raheem Botello MD   simvastatin (ZOCOR) 20 mg tablet simvastatin 20 mg tablet   TAKE 1 TABLET BY MOUTH EVERY DAY IN THE EVENING 90    Raheem Botello MD   Insulin Needles, Disposable, 31 gauge x 3/16\" ndle BD Ultra-Fine Mini Pen Needle 31 gauge x 3/16\"   AS DIRECTED CHECK SUGAR THREE TIMES A DAY THREE TIMES A DAY DIAGNOSIS  E11.22 30    Raheem Botello MD   pantoprazole (PROTONIX) 40 mg tablet Take 40 mg by mouth daily. Raheem Botelol MD   allopurinoL (ZYLOPRIM) 300 mg tablet Take 300 mg by mouth daily. Raheem Botello MD   spironolactone (ALDACTONE) 25 mg tablet Take 25 mg by mouth daily. Raheem Botello MD   metoprolol succinate (TOPROL-XL) 50 mg XL tablet Take 50 mg by mouth daily. Raheem Botello MD   amiodarone (PACERONE) 100 mg tablet Take 100 mg by mouth daily. Raheem Botello MD   furosemide (LASIX) 40 mg tablet Take 40 mg by mouth two (2) times a day. Raheem Botello MD       Review of Systems   Constitutional:  Negative for chills, diaphoresis, fever and malaise/fatigue. Respiratory:  Negative for cough, hemoptysis, sputum production, shortness of breath and wheezing. No cyanosis   Cardiovascular:  Positive for leg swelling. Negative for chest pain, palpitations and claudication. Gastrointestinal:  Negative for abdominal pain, constipation, diarrhea, heartburn, nausea and vomiting. Genitourinary:  Negative for frequency. Musculoskeletal:  Positive for joint pain. Negative for back pain, myalgias and neck pain. Skin:  Negative for itching and rash. Ulcer right foot   Neurological:  Negative for tingling and headaches. Alert and oriented x 4. Endo/Heme/Allergies:  Negative for polydipsia. Psychiatric/Behavioral:  Negative for depression and memory loss.  The patient is not nervous/anxious. Objective:     Visit Vitals  BP (!) 143/86 (BP 1 Location: Left upper arm, BP Patient Position: Sitting, BP Cuff Size: Large adult)   Pulse 74   Temp 97.2 °F (36.2 °C) (Temporal)   Ht 5' 3\" (1.6 m)   BMI 37.88 kg/m²       GEN: Pt is AAOx4 and in NAD. No dressing noted to B/L LE. No family noted at [facility]  DERM: Wound noted to right heel. Fibrotic base to wound. Dry skin noted to B/L LE. No proximal lymphatic streaking noted to B/L LE. VASC: Pedal pulses (DP/PT) diminished to B/L LE. CFT<3sec to all digits of B/L LE. No pedal hair growth noted to the level of the digits for B/L LE. Skin temp is warm to cool from proximal to distal for B/L LE. Neg homans/ne signs to B/L LE. No varicosities or telangectasias noted to B/L LE. Edema noted to bilateral lower extremity  NEURO: Protective and epicritic sensations grossly absent to B/L LE  MSK: (-) POP, No gross deformities. Good muscle tone and bulk noted to B/L LE.  PSYCH: Cooperative with normal mood and affect     Data Review: No results found for this or any previous visit (from the past 24 hour(s)). Assessment:   Diagnoses and all orders for this visit:    1. Diabetic ulcer of right heel associated with type 2 diabetes mellitus, with muscle involvement without evidence of necrosis ([address]    2. Lower extremity edema       Plan:     - Patient seen and evaluated in the office.  - Ulcer was seen and evaluated to the right heel. No signs infection noted at this time wound base is fibrotic with hyperkeratosis around wound edges. At this time recommended for a debridement to be performed  - Using a #15 blade, a sharp/excisional debridement was performed to the wound noted to the plantar aspect of the right foot. Contaminated or devitalized subcutaneous tissue was removed (including epidermis and dermis) down to level subcutaneous tissue. Upon completion, bleeding/granular tissue was noted. Appropriate wound care performed.  The total area debrided measured 15 cmsq.   -Educated patient on peripheral edema. Patient to wear compression socks at all times. Patient to elevate her lower extremity at all times to improve wound healing.  -Wound care clean ulcer with normal saline. Apply collagen dressing, foam dressing, 4x4 gauze, Kerlix and secure with tape. Every other day dressing changes  -Patient would verify for allograft applications to improve her wound healing potential due to her comorbidities. Insurance verification sent.         Jose Cloud DPM, CW, [address] [place] [address], [address] [place] [address] Kin: [phone]  F: [phone]

## 2023-03-15 PROBLEM — E11.9 DIABETES (HCC): Status: ACTIVE | Noted: 2023-01-01

## 2023-03-15 PROBLEM — L89.90 PRESSURE ULCERS OF SKIN OF MULTIPLE TOPOGRAPHIC SITES: Status: ACTIVE | Noted: 2023-01-01

## 2023-03-15 PROBLEM — R60.1 ANASARCA: Status: ACTIVE | Noted: 2023-01-01

## 2023-03-15 PROBLEM — D64.9 ANEMIA: Status: ACTIVE | Noted: 2023-01-01

## 2023-03-15 PROBLEM — E03.9 HYPOTHYROID: Status: ACTIVE | Noted: 2023-01-01

## 2023-03-15 PROBLEM — I50.9 CHF (CONGESTIVE HEART FAILURE) (HCC): Status: ACTIVE | Noted: 2023-01-01

## 2023-03-15 PROBLEM — I10 HTN (HYPERTENSION): Status: ACTIVE | Noted: 2023-01-01

## 2023-03-15 PROBLEM — E11.9 DIABETES MELLITUS TYPE 2, CONTROLLED (HCC): Status: ACTIVE | Noted: 2023-01-01

## 2023-03-15 PROBLEM — E87.20 METABOLIC ACIDOSIS: Status: ACTIVE | Noted: 2023-01-01

## 2023-03-15 PROBLEM — N17.9 AKI (ACUTE KIDNEY INJURY) (HCC): Status: ACTIVE | Noted: 2023-01-01

## 2023-03-15 NOTE — ED PROVIDER NOTES
[facility] EMERGENCY DEPT  EMERGENCY DEPARTMENT HISTORY AND PHYSICAL EXAM      Date: 3/15/2023  Patient Name: Rohan Garcia  MRN: [id number]  YOB: 1950  Date of evaluation: 3/15/2023  Provider: Fernando Fontana MD   Note Started: 8:53 AM 3/15/23    HISTORY OF PRESENT ILLNESS     Chief Complaint   Patient presents with    Abdominal Pain       History Provided By: Patient    HPI: Rohan Garcia is a 74 y.o. female presents with a complaint of abdominal pain    PAST MEDICAL HISTORY   Past Medical History:  Past Medical History:   Diagnosis Date    [address] (congestive heart failure) [address]     Chronic kidney disease     Diabetes ([facility]     Hypertension     Menopause        Past Surgical History:  Past Surgical History:   Procedure Laterality Date    HX IMPLANTABLE CARDIOVERTER DEFIBRILLATOR         Family History:  History reviewed. No pertinent family history. Social History:  Social History     Tobacco Use    Smoking status: Never    Smokeless tobacco: Never       Allergies: Allergies   Allergen Reactions    Contrast Agent [Iodine] Nausea and Vomiting       PCP: Shadi Moser MD    Current Meds:   Previous Medications    ALLOPURINOL (ZYLOPRIM) 300 MG TABLET    Take 300 mg by mouth daily. AMIODARONE (PACERONE) 100 MG TABLET    Take 100 mg by mouth daily. CAPTOPRIL (CAPOTEN) 25 MG TABLET    captopril 25 mg tablet    DULAGLUTIDE (TRULICITY) 1.5 PN/5.0 ML SUB-Q PEN    Trulicity 1.5 UV/2.1 mL subcutaneous pen injector   1.5 MG ONCE A WEEK SUBCUTANEOUSLY 30    FUROSEMIDE (LASIX) 40 MG TABLET    Take 40 mg by mouth two (2) times a day. HYDRALAZINE (APRESOLINE) 10 MG TABLET    Take 1 Tablet by mouth two (2) times a day.     INSULIN DEGLUDEC (TRESIBA) 200 UNIT/ML (3 ML) INPN PEN    Tresiba FlexTouch U-200 insulin 200 unit/mL (3 mL) subcutaneous pen    INSULIN LISPRO (HUMALOG) 100 UNIT/ML KWIKPEN    Humalog KwikPen (U-100) Insulin 100 unit/mL subcutaneous    INSULIN NEEDLES, DISPOSABLE, 31 GAUGE X 3/16\" NDLE    BD Ultra-Fine Mini Pen Needle 31 gauge x 3/16\"   AS DIRECTED CHECK SUGAR THREE TIMES A DAY THREE TIMES A DAY DIAGNOSIS  E11.22 30    LEVOTHYROXINE (SYNTHROID) 50 MCG TABLET    1 tablet in the morning on an empty stomach    METOPROLOL SUCCINATE (TOPROL-XL) 50 MG XL TABLET    Take 50 mg by mouth daily. PANTOPRAZOLE (PROTONIX) 40 MG TABLET    Take 40 mg by mouth daily. SIMVASTATIN (ZOCOR) 20 MG TABLET    simvastatin 20 mg tablet   TAKE 1 TABLET BY MOUTH EVERY DAY IN THE EVENING 90    SPIRONOLACTONE (ALDACTONE) 25 MG TABLET    Take 25 mg by mouth daily. PHYSICAL EXAM     ED Triage Vitals   ED Encounter Vitals Group      BP       Pulse       Resp       Temp       Temp src       SpO2       Weight       Height       Physical Exam  Vitals and nursing note reviewed. Constitutional:       General: She is not in acute distress. Appearance: She is not ill-appearing, toxic-appearing or diaphoretic. HENT:      Head: Normocephalic and atraumatic. Eyes:      Extraocular Movements: Extraocular movements intact. Pupils: Pupils are equal, round, and reactive to light. Cardiovascular:      Rate and Rhythm: Normal rate and regular rhythm. Pulmonary:      Effort: Pulmonary effort is normal.      Breath sounds: Normal breath sounds. Abdominal:      General: Bowel sounds are normal.      Palpations: Abdomen is soft. Tenderness: There is abdominal tenderness in the left lower quadrant. There is no guarding. Hernia: A hernia is present. Hernia is present in the ventral area. Skin:     General: Skin is warm and dry. Capillary Refill: Capillary refill takes less than 2 seconds. Findings: Erythema and rash present. Neurological:      General: No focal deficit present. Mental Status: She is alert. She is confused. SCREENINGS        No data recorded      LAB, EKG AND DIAGNOSTIC RESULTS   Labs:  No results found for this or any previous visit (from the past 12 hour(s)).     EKG: Initial EKG interpreted by me. Ventricular paced rhythm rate 109 no acute ischemic changes not Applicable    Radiologic Studies:  Non-plain film images such as CT, Ultrasound and MRI are read by the radiologist. Plain radiographic images are visualized and preliminarily interpreted by the ED Physician with the following findings: KUB plain film shows nonobstructive pattern     Interpretation per the Radiologist below, if available at the time of this note:  No results found. PROCEDURES   Unless otherwise noted below, none. Procedures      CRITICAL CARE TIME   None    ED COURSE and DIFFERENTIAL DIAGNOSIS/MDM   CC/HPI Summary, DDx, ED Course, and Reassessment 78-year-old female received from rehab center for abdominal pain that was reported this morning, patient last known well was yesterday evening, the facility denies any vomiting or diarrhea, it was noted that patient is complaining of pain in an area where she receives her Trulicity subcutaneous injection, the rehab facility denies registering any fevers    PMHx hypertension, diabetes, CHF    DDx bowel obstruction, ischemic bowel, perforated bowel    MDM  Labs ordered,   IV fluids administered,  CT of abdomen pelvis ordered    Disposition Considerations (Tests not done, Shared Decision Making, Pt Expectation of Test or Treatment.):  Due to findings on CT scan of trace ascites and small pleural effusions along with lung transudates patient admitted for CHF exacerbation    Records Reviewed (source and summary of external notes): Prior medical records and Nursing notes    Vitals: There were no vitals filed for this visit.      ED Course as of 03/15/23 1438   Wed Mar 15, 2023   1058 Creatinine(!): 3.05 [SB]   1058 eGFR(!): 16 [SB]   1234 Hospitalists contacted [SB]   1349 Troponin-High Sensitivity(!): 81 [SB]   1350 HGB(!): 8.6 [SB]   1350 HCT(!): 26.6 [SB]      ED Course User Index  [SB] Julia Hernadez MD       Patient was given the following medications:  Medications - No data to display    CONSULTS: (Who and What was discussed)  None     Social Determinants affecting Dx or Tx: None    FINAL IMPRESSION   No diagnosis found. DISPOSITION/PLAN       Admit Note: Pt is being admitted by Dr. Camacho Miramontes. The results of their tests and reason(s) for their admission have been discussed with pt and/or available family. They convey agreement and understanding for the need to be admitted and for the admission diagnosis. PATIENT REFERRED TO:  Follow-up Information    None           DISCHARGE MEDICATIONS:  Current Discharge Medication List            DISCONTINUED MEDICATIONS:  Current Discharge Medication List          I am the Primary Clinician of Record: Iva Flores MD (electronically signed)    (Please note that parts of this dictation were completed with voice recognition software. Quite often unanticipated grammatical, syntax, homophones, and other interpretive errors are inadvertently transcribed by the computer software. Please disregards these errors.  Please excuse any errors that have escaped final proofreading.)

## 2023-03-15 NOTE — ED TRIAGE NOTES
Sent from [facility] for abd pain, jazzmine when moved, right side of abd swollen, and bruising noted, gets Trulicity injections, more altered than normal

## 2023-03-15 NOTE — ROUTINE PROCESS
Patient arrived on floor via stretcher, yelling whenever touched and hitting staff. Abdomen obese with redness and hard to touch to right lower quad. Dressings to bilateral legs with pungent odor. Skin is dry and  flaky, diaper on, and bed alarm on.

## 2023-03-15 NOTE — H&P
History and Physical      Chief Complaints:     Chief Complaint   Patient presents with    Abdominal Pain         Subjective:     Georgina Suarez is a 74 y.o. female followed by Dr. Ranjana Joy MD at [facility] long-term [facility] and Dr. Josef Holden cardiologist Dr Tino Mckeon MD electrophysiologist and [address] kidney specialist Dr. Emerita Calvo for CKD and  has a past medical history of CHF (congestive heart failure) ([address] Chronic kidney disease, Diabetes ([address] Hypertension, Hypothyroidism, Menopause, Nonischemic cardiomyopathy ([address] and Supraventricular tachycardia ([address] Presents from the nursing home after noting area where Trulicity was given in the right lower abdomen increased bruising/hematoma and to get evaluated. Patient unable to give history and most of the history was obtained from ER physician's note as well as family at bedside. Patient was last hospitalized at [facility] in December and at that time was found to have an EF of 25% and at that time was discharged on Lasix 40 mg oral twice daily and spironolactone as noted on review of nursing home [facility] that on 3 1 Lasix was changed to daily. Family noted increasing leg swelling and also was noted to have a weight which was checked once weekly at the nursing home of 213 pounds but currently patient is 225 pounds. Also family notes that patient drinks a lot of liquids but patient never complained to them about shortness of breath or chest pain but suddenly had experiencing abdominal pain which patient was unable to describe. There was no noted nausea or vomiting and family notes fairly large bowel movement this morning. On initial evaluation blood pressure was slightly elevated 177/96 with a heart rate of 109 O2 sat 97% on room air but on acute care it was noted that with increased activity patient's O2 sat dropped to 85% so was placed on 2 L. Patient initially was given a 1 L fluid bolus.   Patient initial BUN/creatinine was 100/3.05 previous BUN/creatinine of 30/2. 14. Troponins x2 were in similar range of 80 and then 81. Last A1c in December was at 5.7 and current BNP was 27,580. Hemoglobin noted to be 8.6 but was in the 11 range in 2022. CT chest small b/l pleural change the dates larger on the right with right basilar subsegmental atelectasis also noted right posterior back soft tissue hematoma. CT abdomen pelvis also notes extensive anasarca and a probable hematoma in the right posterior flank soft tissue of 5.3 x 3.4 cm also notes colonic diverticulosis without diverticulitis no nephrolithiasis or hydronephrosis positive cholelithiasis. Initial BUN/creatinine was elevated at 100/3.04 and with noted anemia will need to check stool for occult blood and iron panel. So patient was referred for admission to remote telemetry for acute on chronic systolic heart failure exacerbation, anasarca, LES on CKD      Past Medical History:   Diagnosis Date    CHF (congestive heart failure) [address]     Chronic kidney disease     Diabetes ([facility] [address]     Hypertension     Hypothyroidism     Menopause     Nonischemic cardiomyopathy ([address]     Supraventricular tachycardia ([facility] [address]     s/p ablation      Past Surgical History:   Procedure Laterality Date    HX  SECTION      HX HERNIA REPAIR      HX IMPLANTABLE CARDIOVERTER DEFIBRILLATOR       Family History   Problem Relation Age of Onset    Diabetes Mother     Diabetes Father       Social History     Tobacco Use    Smoking status: Never    Smokeless tobacco: Never   Substance Use Topics    Alcohol use: Not on file       Prior to Admission medications    Medication Sig Start Date End Date Taking?  Authorizing Provider   captopriL (CAPOTEN) 25 mg tablet captopril 25 mg tablet    Other, MD Raheem   dulaglutide (Trulicity) 1.5 GP/5.2 mL sub-q pen Trulicity 1.5 HL/5.3 mL subcutaneous pen injector   1.5 MG ONCE A WEEK SUBCUTANEOUSLY 30    Other, MD Raheem   hydrALAZINE (APRESOLINE) 10 mg tablet Take 1 Tablet by mouth two (2) times a day. 8/22/22 8/22/23  Raheem Botello MD   insulin degludec (TRESIBA) 200 unit/mL (3 mL) inpn pen Tresiba FlexTouch U-200 insulin 200 unit/mL (3 mL) subcutaneous pen    Raheem Botello MD   levothyroxine (SYNTHROID) 50 mcg tablet 1 tablet in the morning on an empty stomach    Raheem Botello MD   insulin lispro (HUMALOG) 100 unit/mL kwikpen Humalog KwikPen (U-100) Insulin 100 unit/mL subcutaneous    Raheem Botello MD   simvastatin (ZOCOR) 20 mg tablet simvastatin 20 mg tablet   TAKE 1 TABLET BY MOUTH EVERY DAY IN THE EVENING 90    Raheem Botello MD   Insulin Needles, Disposable, 31 gauge x 3/16\" ndle BD Ultra-Fine Mini Pen Needle 31 gauge x 3/16\"   AS DIRECTED CHECK SUGAR THREE TIMES A DAY THREE TIMES A DAY DIAGNOSIS  E11.22 30    Raheem Botello MD   pantoprazole (PROTONIX) 40 mg tablet Take 40 mg by mouth daily. Raheem Botello MD   allopurinoL (ZYLOPRIM) 300 mg tablet Take 300 mg by mouth daily. Raheem Botello MD   spironolactone (ALDACTONE) 25 mg tablet Take 25 mg by mouth daily. Raheem Botello MD   metoprolol succinate (TOPROL-XL) 50 mg XL tablet Take 50 mg by mouth daily. Raheem Botello MD   amiodarone (PACERONE) 100 mg tablet Take 100 mg by mouth daily. Raheem Botello MD   furosemide (LASIX) 40 mg tablet Take 40 mg by mouth two (2) times a day. Raheem Botello MD     Allergies   Allergen Reactions    Contrast Agent [Iodine] Nausea and Vomiting        Review of Systems:  Review of Systems   Unable to perform ROS: Dementia   Psychiatric/Behavioral:  Negative for behavioral problems. Objective:     Vitals:  Visit Vitals  /71   Pulse 100   Temp 98 °F (36.7 °C)   Resp 17   Ht 5' 3\" (1.6 m)   Wt 102.1 kg (225 lb)   SpO2 94%   BMI 39.86 kg/m²       Physical Exam:  General: Alert, cooperative, no distress. Head:  Normocephalic, without obvious abnormality, atraumatic. Eyes:  Conjunctivae/corneas clear. Pupils equal, round, reactive to light. Extraocular movements intact.   Lungs: Bilateral air entry diminished no wheezes rales rhonchi   chest wall: No tenderness or deformity. Heart:  Regular rate and rhythm, S1, S2 normal, no murmur, click, rub, or gallop. Abdomen:   Soft, distended mild epigastric tenderness . Bowel sounds normal. No masses. No organomegaly. Back:  No spine tenderness to palpation  Extremities: Bilateral lower extremity edema pitting at the bilateral thigh stasis venous ulcers lower extremities  pulses: Symmetric all extremities. Skin: Skin color, texture, turgor normal.   Lymph nodes: Cervical nodes normal.  Neurologic: CNII-XII intact. Normal strength, sensation, and reflexes throughout. Labs:  Recent Results (from the past 24 hour(s))   EKG, 12 LEAD, INITIAL    Collection Time: 03/15/23  8:53 AM   Result Value Ref Range    Ventricular Rate 109 BPM    Atrial Rate 105 BPM    P-R Interval 77 ms    QRS Duration 180 ms    Q-T Interval 419 ms    QTC Calculation (Bezet) 565 ms    Calculated P Axis 5 degrees    Calculated R Axis -83 degrees    Calculated T Axis 90 degrees    Diagnosis       Ventricular-paced complexes  No further analysis attempted due to paced rhythm  Baseline wander in lead(s) I,II,aVR,aVL,V3     CBC WITH AUTOMATED DIFF    Collection Time: 03/15/23  9:02 AM   Result Value Ref Range    WBC 6.9 4.4 - 11.3 K/uL    RBC 3.07 (L) 4.50 - 5.90 M/uL    HGB 8.6 (L) 13.5 - 17.5 g/dL    HCT 26.6 (L) 36 - 46 %    MCV 86.8 80 - 100 FL    MCH 28.0 (L) 31 - 34 PG    MCHC 32.3 31.0 - 36.0 g/dL    RDW 18.1 (H) 11.5 - 14.5 %    PLATELET 226 881 - 638 K/uL    MPV 8.3 6.5 - 11.5 FL    NRBC 3.6 (H) 0.0  WBC    ABSOLUTE NRBC 0.25 K/uL    NEUTROPHILS 89 (H) 42 - 75 %    LYMPHOCYTES 5 (L) 20.5 - 51.1 %    MONOCYTES 6 1.7 - 9.3 %    EOSINOPHILS 0 (L) 0.9 - 2.9 %    BASOPHILS 0 0.0 - 2.5 %    ABS. NEUTROPHILS 6.1 1.8 - 7.7 K/UL    ABS. LYMPHOCYTES 0.3 (L) 1.0 - 4.8 K/UL    ABS. MONOCYTES 0.4 0.2 - 2.4 K/UL    ABS. EOSINOPHILS 0.0 0.0 - 0.7 K/UL    ABS.  BASOPHILS 0.0 0.0 - 0.2 K/UL   PROTHROMBIN TIME + INR    Collection Time: 03/15/23  9:02 AM   Result Value Ref Range    Prothrombin time 18.7 (H) 11.9 - 14.6 sec    INR 1.6 (H) 0.9 - 1.1     METABOLIC PANEL, COMPREHENSIVE    Collection Time: 03/15/23  9:02 AM   Result Value Ref Range    Sodium 145 136 - 145 mmol/L    Potassium 4.2 3.5 - 5.1 mmol/L    Chloride 110 (H) 97 - 108 mmol/L    CO2 17 (L) 21 - 32 mmol/L    Anion gap 18 (H) 5 - 15 mmol/L    Glucose 168 (H) 65 - 100 mg/dL     (H) 6 - 20 mg/dL    Creatinine 3.05 (H) 0.55 - 1.02 mg/dL    BUN/Creatinine ratio 33 (H) 12 - 20      eGFR 16 (L) >60 ml/min/1.73m2    Calcium 9.1 8.5 - 10.1 mg/dL    Bilirubin, total 0.9 0.2 - 1.0 mg/dL    AST (SGOT) 14 (L) 15 - 37 U/L    ALT (SGPT) 15 12 - 78 U/L    Alk. phosphatase 134 (H) 45 - 117 U/L    Protein, total 7.1 6.4 - 8.2 g/dL    Albumin 2.4 (L) 3.5 - 5.0 g/dL    Globulin 4.7 (H) 2.0 - 4.0 g/dL    A-G Ratio 0.5 (L) 1.1 - 2.2     TROPONIN-HIGH SENSITIVITY    Collection Time: 03/15/23  9:02 AM   Result Value Ref Range    Troponin-High Sensitivity 80 (H) 0 - 51 ng/L   LIPASE    Collection Time: 03/15/23  9:02 AM   Result Value Ref Range    Lipase 40 (L) 73 - 393 U/L   LACTIC ACID    Collection Time: 03/15/23  9:02 AM   Result Value Ref Range    Lactic acid 1.8 0.4 - 2.0 mmol/L   MAGNESIUM    Collection Time: 03/15/23  9:02 AM   Result Value Ref Range    Magnesium 2.1 1.6 - 2.4 mg/dL   TROPONIN-HIGH SENSITIVITY    Collection Time: 03/15/23 11:26 AM   Result Value Ref Range    Troponin-High Sensitivity 81 (H) 0 - 51 ng/L   TSH 3RD GENERATION    Collection Time: 03/15/23 11:26 AM   Result Value Ref Range    TSH 6.14 (H) 0.36 - 3.74 uIU/mL   GLUCOSE, POC    Collection Time: 03/15/23  4:00 PM   Result Value Ref Range    Glucose (POC) 164 (H) 65 - 100 mg/dL    Performed by Rosio Rizo        Imaging:  XR ABD (KUB)    Result Date: 3/15/2023  Nonobstructive bowel gas pattern.      CT CHEST WO CONT    Result Date: 3/15/2023  Small bilateral pleural transudates, larger on the right Right basilar subsegmental atelectasis Incidental significant third spacing Right posterior back soft tissue hematoma Incidental cholelithiasis    CT ABD PELV WO CONT    Result Date: 3/15/2023  1. Moderate right and small left pleural effusions, trace ascites, and extensive third spacing of edema/anasarca. Probable hematoma in the right posterior flank soft tissues. 2. Colonic diverticulosis with no definite diverticulitis allowing for motion artifact. 3. No nephrolithiasis or hydronephrosis. 4. Cholelithiasis. 5. Nonspecific air within the uterine fundus, correlate clinically.        Assessment & Plan:     Acute on chronic systolic heart failure exacerbation  -Has noted EF of 25% in December 2022 and recently discharged with 40 mg of oral Lasix twice daily and spironolactone but noted on 3/1 to be decreased Lasix down to daily dosing possibly secondary to worsening BUN/creatinine  -BNP elevated 27,580  -Troponins x2 are negative  -Appreciate cardiology evaluation and input  -Lasix 40 mg IV twice daily  -Monitor vital signs and renal function closely  -Daily weights which patient noted to have increased weight from 213 pounds and currently is at 225 pounds  -Strict I's and O's  -Fluid restriction of 1.2 L/day    Anasarca  -As noted on CT of the abdomen pelvis with extensive anasarca  -Continue current IV diuresis  -Monitor closely    Abdominal pain  -Possibly secondary to the anasarca but otherwise CT of the abdomen pelvis was negative for any acute process  -Patient does have a history of GERD and with noted elevated BUN of 100 we will need to check for possible upper GI bleed  -Obtain GI consultation  -Obtain stool for occult blood  -Monitor hemoglobin closely  -IV Protonix twice daily    LES on CKD  -BUN/creatinine was 30/2.14 when discharged from [place] [address] on 12/22 but currently BUN is elevated 100 and a creatinine of 3.05  -Also noted metabolic acidosis CO2 of 17 monitor closely with current IV diuresis  -Nephrology consulted and appreciate recommendations  -Monitor daily weight and strict I's and O's  -Obtain urinalysis and protein creatinine ratio    Hypothyroidism  -Chronic and continue home levothyroxine at 50 mcg daily  -Obtain TSH    Diabetes  -Last A1c in December 2022 was at 5.7  -Hold Trulicity dosing and placed on sliding scale with Accu-Cheks before meals and at bedtime    Dementia with behavioral disturbances  -Noted to be initially on 0.5 mg of risperidone twice daily but recently increased as of March 1 to 1 mg twice a daily but daughter does note that patient been having increasing sedation during the day  -We will place on 0.5 mg oral daily first dose now and then 1 mg at bedtime    Multiple stages of skin ulcers  -Patient bedbound at the nursing home and restarted previous wound care apply to bilateral lower extremities as well as sacral were stage II ulcer was noted  -Continue frequent turning and monitoring      GI prophylaxis  -IV Protonix twice daily    DVT prophylaxis  -Contraindicated secondary to noted hematoma as well as need to rule out an acute GI bleed as cause of her abdominal pain    CODE STATUS: Full code  Patient is designated decision-maker is her daughter Phill Joel 75 minutes evaluating cording patient's admission to remote telemetry and expecting at least 1 to 2 days of acute care stay      Electronically signed by Dameon Riley MD on 3/15/2023 at 4:06 PM

## 2023-03-15 NOTE — PROGRESS NOTES
Problem: Falls - Risk of  Goal: *Absence of Falls  Description: Document Eugenie Carrera Fall Risk and appropriate interventions in the flowsheet. Outcome: Progressing Towards Goal  Note: Fall Risk Interventions:                                Problem: Patient Education: Go to Patient Education Activity  Goal: Patient/Family Education  Outcome: Progressing Towards Goal     Problem: Pressure Injury - Risk of  Goal: *Prevention of pressure injury  Description: Document Isidro Scale and appropriate interventions in the flowsheet. Outcome: Progressing Towards Goal  Note: Pressure Injury Interventions:       Moisture Interventions: Absorbent underpads, Internal/External urinary devices, Minimize layers    Activity Interventions: Pressure redistribution bed/mattress(bed type)    Mobility Interventions: HOB 30 degrees or less, Turn and reposition approx.  every two hours(pillow and wedges)    Nutrition Interventions: Document food/fluid/supplement intake    Friction and Shear Interventions: Apply protective barrier, creams and emollients, Minimize layers                Problem: Patient Education: Go to Patient Education Activity  Goal: Patient/Family Education  Outcome: Progressing Towards Goal

## 2023-03-15 NOTE — ROUTINE PROCESS
Bedside shift change report given to Aydee Pope LPN (oncoming nurse) by Slade Obrien RN (offgoing nurse). Report included the following information Kardex.

## 2023-03-16 NOTE — CONSENT
Informed Consent for Blood Component Transfusion Note    I have discussed with the POA / [facility] branch the rationale for blood component transfusion; its benefits in treating or preventing fatigue, organ damage, or death; and its risk which includes mild transfusion reactions, rare risk of blood borne infection, or more serious but rare reactions. I have discussed the alternatives to transfusion, including the risk and consequences of not receiving transfusion. The  POA / [facility]oon branch had an opportunity to ask questions and had agreed to proceed with transfusion of blood components.     Electronically signed by Frank Mcwilliams MD on 3/16/23 at 12:48 PM

## 2023-03-16 NOTE — PROCEDURES
Indication - HD monitoring  Diagnosis - s/p cardiac arrest    Done emergently. The patients left wrist was prepped and draped in sterile fashion. 1% Lidocaine was used to anesthetize the area. A 20G Arrow arterial line was introduced into the radial artery. The catheter was threaded over the guide wire and the needle was removed with appropriate pulsatile blood return. The catheter was then sutured in place to the skin and a sterile dressing applied. Perfusion to the extremity distal to the point of catheter insertion was checked and found to be adequate.

## 2023-03-16 NOTE — PROGRESS NOTES
DATE OF SERVICE:  10/03/2019    PREOPERATIVE DIAGNOSES:  Lumbar 4-5 spondylolisthesis, stenosis and status   post a previous lumbar laminectomy by another surgeon.    POSTOPERATIVE DIAGNOSES:  Lumbar 4-5 spondylolisthesis, stenosis and status   post a previous lumbar laminectomy by another surgeon.    PROCEDURES:  1.  Redo lumbar 4-5 bilateral laminectomy, total facetectomy bilaterally,   neuroforaminotomies bilaterally at lumbar 4 and 5 roots.  2.  Right-sided diskectomy at lumbar 4-5 with interbody arthrodesis.  3.  Implantation of Globus expandable cage at lumbar 4-5.  4.  Open reduction of spondylolisthesis.  5.  Pedicle screw instrumentation using Medtronic Solera screws.  6.  Use of fluoroscopic guidance for pedicle screw placement.  7.  Posterolateral arthrodesis, lumbar 4-5.  8.  Use of locally harvested morselized autograft.  9.  Use of allograft.    SURGEON:  Kyler Juares MD    ASSISTANT:  EUSEBIO Urena    ANESTHESIA:  General.    COMPLICATIONS:  None.    ESTIMATED BLOOD LOSS:  100 mL.    DESCRIPTION OF PROCEDURE:  Patient was brought to the operating room,   identified in the usual fashion.  General endotracheal anesthesia was induced   by the anesthesia team.  Patient was then placed prone on the Charly table   with bolsters.  All pressure points were meticulously padded.  Midline lumbar   incision was marked in the skin using fluoroscopic guidance from his previous   incision.  He was then prepped and draped in the usual sterile fashion.  Local   anesthesia was infiltrated in subcutaneous tissue.  A 10 blade was used to   incise the skin.  Dissection was carried down in a subperiosteal fashion   bilaterally.  We identified the lumbar 4-5 joints and lumbar 4 and 5   transverse processes.  A Kocher was placed in what we believed to be the   transverse process of lumbar 4.  Film was taken confirming that we were at the   correct level.  This was then marked with a blue marking pen  Spiritual Care Assessment/Progress Note  [facility]      NAME: Miranda Gabriel      MRN: [id number]  AGE: 74 y.o. SEX: female  Anabaptism Affiliation: Mandaeism   Language: English     3/16/2023     Total Time (in minutes): 78     Spiritual Assessment begun in [facility] [place] [facility] CV INTNSV CARE through conversation with:         []Patient        [x] Family    [] Friend(s)        Reason for Consult: Code Blue/99     Spiritual beliefs: (Please include comment if needed)     [] Identifies with a erica tradition:         [] Supported by a erica community:            [] Claims no spiritual orientation:           [] Seeking spiritual identity:                [] Adheres to an individual form of spirituality:           [x] Not able to assess:   Mandaeism, per chart                        Identified resources for coping:      [] Prayer                               [] Music                  [] Guided Imagery     [] Family/friends                 [] Pet visits     [] Devotional reading                         [x] Unknown     [] Other:                                               Interventions offered during this visit: (See comments for more details)          Family/Friend(s):  Affirmation of emotions/emotional suffering, Prayer (assurance of), Initial Assessment, Normalization of emotional/spiritual concerns     Plan of Care:     [] Support spiritual and/or cultural needs    [] Support AMD and/or advance care planning process      [] Support grieving process   [] Coordinate Rites and/or Rituals    [] Coordination with community clergy   [] No spiritual needs identified at this time   [] Detailed Plan of Care below (See Comments)  [] Make referral to Music Therapy  [] Make referral to Pet Therapy     [] Make referral to Addiction services  [] Make referral to [facility]  [] Make referral to Spiritual Care Partner  [] No future visits requested        [x] Contact Spiritual Care for further referrals     Comments:  responded to Markus Reno called for MsNarendra Rasmussen. Consulted with Nursing Supervisor prior to visiting with family. Met pt's daughters and grandchildren in CVICU waiting area and offered presence and support as they spent some time at bedside, after receiving update from medical team. Family expressed their love and concern for patient.  unable to directly assess pt's spiritual needs due to pt's condition. Assured family of ongoing prayers and explored with family any additional needs - none expressed at this time. Nursing staff placed referral for [address] [place] [address],[address] due to travel distance for family.     Chaplains remain available for ongoing support; please contact [facility] for further referral.    Dinah Dumas, [address] [place] [address]  [phone] bilaterally.  We   then adjusted the retractors and completed the dissection.  There was a   significant amount of scar tissue.  We performed total facetectomies using a   combination of upgoing curette to identify the lateral border of the thecal   sac and the medial border of the bone.  We took the laminectomy defect   laterally all the way to the medial border of the pedicle.  We took total   facetectomies using a combination of Leksell rongeur, high-speed air drill,   Kerrison 2, 3 and 4 punches.  We identified the lumbar 4 and 5 roots and had   them very nicely decompressed.  We did facetectomies bilaterally, again as   noted.  Bipolar electrocautery was used in the epidural veins for hemostasis.    We then reflected the thecal sac and L5 nerve root shoulder medially using a   nerve root retractor.  Bipolar electrocautery was used for hemostasis.  A 15   blade was used to incise the disk space.  We then removed disk contents using   progressively larger arpan, alley and a pituitary and then straight and   angled box curettes to prepare the endplates.  Copious amounts of antibiotic   irrigation was injected into the disk space to free up any additional free   fragments, which were then removed with an alley and a pituitary.  We then   packed locally harvested morselized autograft anteriorly into the disk space   and pushed it anteriorly using a [employer] dental.  We then placed an 8-14 mm   Globus expandable cage into the disk space.  Film was taken confirming that we   were at the correct level and it looked to be in excellent position.  It was   then expanded to 14 mm without incident.  Once this was done, we then turned   our attention to placing pedicle screws under fluoroscopic guidance.    High-speed air drill was used to drill a  hole.  A 2.8 mm drill was used   to cannulate the pedicle.  Mariah confirmed we had no breach.  A 6.0 mm tap   was then used to tap the pedicle.  Mariah confirmed we had  no breach.  We   then placed 6.5x55 mm screws at lumbar 4 and 5 bilaterally, again under   fluoroscopic guidance.  There were no breaches from inside the pedicle nor   were there breaches from the medial, superior and inferior walls as felt with   a double ball probe.  We then placed the appropriately sized rods and nuts at   lumbar 5.  The rods were left slightly proud to the lumbar 4 screws.  Final   tightening was placed at lumbar 5.  We then used dual reducers to reduce the   spondylolisthesis.  We placed nut caps and then final tightened with a little   bit of compression to increase lordosis.  We then checked bilateral neural   foramina to confirm that the lumbar 4 and 5 roots were still nice and free and   clear.  Copious amounts of antibiotic irrigation were used to wash out the   wound.  FloSeal with gentle tamponade was used for hemostasis.  We   decorticated the transverse processes of lumbar 4 and 5 using high-speed air   drill.  We then packed locally harvested morselized autograft and allograft   into the gutters bilaterally.  We left vancomycin powder on top of the   hardware.  We left a Hemovac drain in the epidural space.  We then closed the   incision in layers and topped with staples.  There were no complications.    Patient tolerated the procedure well and was transferred to the recovery room.       ____________________________________     MARITA GARCIA MD    CPD / NTS    DD:  10/03/2019 10:32:23  DT:  10/03/2019 14:39:01    D#:  [id number]  Job#:  [id number]

## 2023-03-16 NOTE — CONSULTS
NEPHROLOGY CONSULT NOTE     Patient: Olegario Silva MRN: [id number]  PCP: Natalie Gerard MD   :     1950  Age:   74 y.o. Sex:  female      Referring physician: Anthony Adkins MD  Reason for consultation: 74 y.o. female with CHF (congestive heart failure) ([facility] [X34.8] complicated by LES on CKD  Admission Date: 3/16/2023  4:27 PM  LOS: 0 days      ASSESSMENT and PLAN :   LES on CKD  - sCr this AM 3.30 up from baseline around 2.1 in December of last year. Follows with   - Primary insult likely CRS, now with prolonged hypotension, post cardiac arrest  - Per outside records, uOP minimal at 125mL following 40mg Lasix  - Given LES with scant uOP, now post arrest on three pressors, suspect she will need CRRT  - Repeat BMP is pending - at present no hyperkalemia or profound acidosis though with poor uOP and post arrest may develop  - CT A/P without evidence of hydronephrosis or urinary obstruction  - Send urine chemistries  - Place patel catheter, monitor urine output  - Renally dose medications, avoid nephrotoxins  - Trend renal indices daily    Acute on chronic systolic heart failure exacerbation  Acute hypoxic respiratory failure  - Echo with EF 13%, dilated IVC, moderate to severe tricuspid regurg  - Poor urine output with 40mg Lasix at outside hospital  - Diuresis as tolerated  - Cardiology/Heart failure consulting    Metabolic acidosis  - Serum bicarb 17, gap 15, pH 7.33  - Repeat pending    Hypothyroidism  - TSH 6.14, T4 pending  - On Synthroid    DMII  Dementia, hx of    Care Plan discussed with:  Patient, nurse, ICU attending, Dr Sosa Record (nephrology attending)       Addendum 2264:  - Patient has been made DNR by family  - Labs drawn prior to arrest with bicarb 14, creatinine now 3.47, lactic acid 2.9  - Post arrest VBG pH 7.09, bicarb 8.7  - Now made DNR by family  - Discussed likely need for dialysis with family - they are not ready to make a decision for or against dialysis at this time.  Explained the indications for CRRT - including hyperkalemia, acidosis, volume overload as well as the risks associated with RRT. - If patient's condition worsens and she requires dialysis, will discuss again with family over the phone     Thank you for consulting [facility] in the care of your patient. Subjective:   HPI: Olegario Silva is a 74 y.o.  female with past medical history of CKD, HFrEF, DM, HTN, hypothyroidism who has been transferred to [facility] from [facility] for management of CHF exacerbation. Patient initially presented to [facility] hospital from [facility] on 3/15 with complaint of hypoxia, weight gain, worsening lower extremity edema. Found with acute on chronic heart failure exacerbation, also with LES on CKD. Transferred to [facility] for further management. On arrival to [facility], patient found to be hypotensive, tachycardic, lethargic, hypoxic. Transferred to the ICU and emergently intubated, placed on pressors. Shortly after intubated she experienced cardiac arrest with ROSC. - sCr 3.30 up from baseline around 2.1 in December of last year. Follows with nephrology Dr Beka Caldwell. - Received Lasix 40mg at [facility] hospital, 125mL urine output. No urine output since arrival at [facility]  - Welch catheter being placed  - CT A/P at [facility] without evidence of hydronephrosis or obstruction    Past Medical History:   Diagnosis Date    CHF (congestive heart failure) (HCC)     Chronic kidney disease     Diabetes ([facility]     Hypertension     Hypothyroidism     Menopause     Nonischemic cardiomyopathy (HCC)     Supraventricular tachycardia ([facility]     s/p ablation        Past Surgical History:   Procedure Laterality Date    HX  SECTION      HX HERNIA REPAIR      HX IMPLANTABLE CARDIOVERTER DEFIBRILLATOR         Allergies   Allergen Reactions    Contrast Agent [Iodine] Nausea and Vomiting       Social Hx:    reports that she has never smoked.  She has never used smokeless tobacco.     Family History   Problem Relation Age of Onset    Diabetes Mother     Diabetes Father        Review of Systems   Unable to perform ROS: Intubated      Objective:      I&O's:  No intake/output data recorded. Visit Vitals  BP (!) 59/33   Pulse (!) 139   Resp 28       Physical Exam  Vitals and nursing note reviewed. Constitutional:       Appearance: She is well-developed. She is obese. She is ill-appearing. HENT:      Head: Normocephalic. Mouth/Throat:      Mouth: Mucous membranes are dry. Cardiovascular:      Rate and Rhythm: Tachycardia present. Rhythm irregular. Heart sounds: Murmur heard. Pulmonary:      Comments: Intubated, symmetric expansion  Diminished in bases  Abdominal:      General: Bowel sounds are normal.      Palpations: Abdomen is soft. Tenderness: There is no abdominal tenderness. Genitourinary:     Comments: No urinary catheter  Musculoskeletal:      Right lower leg: Edema present. Left lower leg: Edema present. Skin:     General: Skin is warm and dry. Capillary Refill: Capillary refill takes 2 to 3 seconds.    Neurological:      Comments: Sedated/Intubated   Psychiatric:      Comments: Unable to evaluate       Laboratory Results:    Recent Labs     03/16/23  0521 03/15/23  2206 03/15/23  0902    146* 145   K 4.4 4.3 4.2   * 112* 110*   CO2 17* 17* 17*   * 172* 168*   * 100* 100*   CREA 3.30* 3.15* 3.05*   CA 9.0 9.1 9.1   MG 2.0 2.0 2.1   ALB  --   --  2.4*   ALT  --   --  15   INR  --   --  1.6*     Recent Labs     03/16/23  1000 03/16/23  0521 03/15/23  0902   WBC  --  6.9 6.9   HGB 7.7* 7.4* 8.6*   HCT 24.4* 23.5* 26.6*   PLT  --  155 152     Lab Results   Component Value Date/Time    Color Yellow/Straw 03/16/2023 04:53 AM    Appearance Clear 03/16/2023 04:53 AM    Specific gravity 1.015 03/16/2023 04:53 AM    Specific gravity 1.025 08/06/2021 10:38 AM    pH (UA) 5.5 03/16/2023 04:53 AM    Protein 30 (A) 03/16/2023 04:53 AM    Glucose Negative 03/16/2023 04:53 AM    Ketone Negative 03/16/2023 04:53 AM    Bilirubin Negative 03/16/2023 04:53 AM    Urobilinogen 0.2 03/16/2023 04:53 AM    Nitrites Negative 03/16/2023 04:53 AM    Leukocyte Esterase Negative 03/16/2023 04:53 AM    Bacteria 4+ (A) 03/16/2023 04:53 AM    WBC 0-4 03/16/2023 04:53 AM    RBC 0-5 03/16/2023 04:53 AM     Recent Results (from the past 24 hour(s))   GLUCOSE, POC    Collection Time: 03/15/23  9:35 PM   Result Value Ref Range    Glucose (POC) 182 (H) 65 - 100 mg/dL    Performed by [address] [place] [address], [facility]    Collection Time: 03/15/23 10:06 PM   Result Value Ref Range    Sodium 146 (H) 136 - 145 mmol/L    Potassium 4.3 3.5 - 5.1 mmol/L    Chloride 112 (H) 97 - 108 mmol/L    CO2 17 (L) 21 - 32 mmol/L    Anion gap 17 (H) 5 - 15 mmol/L    Glucose 172 (H) 65 - 100 mg/dL     (H) 6 - 20 mg/dL    Creatinine 3.15 (H) 0.55 - 1.02 mg/dL    BUN/Creatinine ratio 32 (H) 12 - 20      eGFR 15 (L) >60 ml/min/1.73m2    Calcium 9.1 8.5 - 10.1 mg/dL   MAGNESIUM    Collection Time: 03/15/23 10:06 PM   Result Value Ref Range    Magnesium 2.0 1.6 - 2.4 mg/dL   URINALYSIS W/ RFLX MICROSCOPIC    Collection Time: 03/16/23  4:53 AM   Result Value Ref Range    Color Yellow/Straw      Appearance Clear Clear      Specific gravity 1.015 1.003 - 1.030      pH (UA) 5.5 5.0 - 8.0      Protein 30 (A) Negative mg/dL    Glucose Negative Negative mg/dL    Ketone Negative Negative mg/dL    Bilirubin Negative Negative      Blood Negative Negative      Urobilinogen 0.2 0.2 - 1.0 EU/dL    Nitrites Negative Negative      Leukocyte Esterase Negative Negative     PROTEIN/CREATININE RATIO, URINE    Collection Time: 03/16/23  4:53 AM   Result Value Ref Range    Protein, urine random 63 (H) 0.0 - 11.9 mg/dL    Creatinine, urine random 80.37 (A) No reference range has been established. mg/dL    Protein/Creat.  urine Ratio 0.8     URINE MICROSCOPIC    Collection Time: 03/16/23 4:53 AM   Result Value Ref Range    WBC 0-4 0 - 5 /hpf    RBC 0-5 0 - 3 /hpf    Bacteria 4+ (A) Negative /hpf   METABOLIC PANEL, BASIC    Collection Time: 03/16/23  5:21 AM   Result Value Ref Range    Sodium 145 136 - 145 mmol/L    Potassium 4.4 3.5 - 5.1 mmol/L    Chloride 113 (H) 97 - 108 mmol/L    CO2 17 (L) 21 - 32 mmol/L    Anion gap 15 5 - 15 mmol/L    Glucose 152 (H) 65 - 100 mg/dL     (H) 6 - 20 mg/dL    Creatinine 3.30 (H) 0.55 - 1.02 mg/dL    BUN/Creatinine ratio 30 (H) 12 - 20      eGFR 14 (L) >60 ml/min/1.73m2    Calcium 9.0 8.5 - 10.1 mg/dL   MAGNESIUM    Collection Time: 03/16/23  5:21 AM   Result Value Ref Range    Magnesium 2.0 1.6 - 2.4 mg/dL   CBC W/O DIFF    Collection Time: 03/16/23  5:21 AM   Result Value Ref Range    WBC 6.9 4.4 - 11.3 K/uL    RBC 2.66 (L) 4.50 - 5.90 M/uL    HGB 7.4 (L) 13.5 - 17.5 g/dL    HCT 23.5 (L) 36 - 46 %    MCV 88.1 80 - 100 FL    MCH 28.0 (L) 31 - 34 PG    MCHC 31.7 31.0 - 36.0 g/dL    RDW 18.0 (H) 11.5 - 14.5 %    PLATELET 609 362 - 432 K/uL    MPV 9.3 6.5 - 11.5 FL    NRBC 5.5  WBC    ABSOLUTE NRBC 0.38 K/uL   CK    Collection Time: 03/16/23  5:21 AM   Result Value Ref Range    CK 20 (L) 26 - 192 U/L   GLUCOSE, POC    Collection Time: 03/16/23  7:47 AM   Result Value Ref Range    Glucose (POC) 148 (H) 65 - 100 mg/dL    Performed by Dsouza Raymundo    TYPE & SCREEN    Collection Time: 03/16/23 10:00 AM   Result Value Ref Range    Crossmatch Expiration 03/19/2023,2359     ABO/Rh(D) O Positive     Antibody screen Negative     Unit number [id number]     Blood component type [facility]     Unit division 00     Status of unit [address] to transfuse     Crossmatch result Compatible    HGB & HCT    Collection Time: 03/16/23 10:00 AM   Result Value Ref Range    HGB 7.7 (L) 13.5 - 17.5 g/dL    HCT 24.4 (L) 36 - 46 %   GLUCOSE, POC    Collection Time: 03/16/23 11:54 AM   Result Value Ref Range    Glucose (POC) 135 (H) 65 - 100 mg/dL Performed by Dsouza Large    ECHO ADULT FOLLOW-UP OR LIMITED    Collection Time: 03/16/23  1:41 PM   Result Value Ref Range    EF BP 13 (A) 55 - 100 %    LV Ejection Fraction A2C 20 %    LV Ejection Fraction A4C 10 %    LV EDV A2C 82 mL    LV EDV A4C 83 mL    LV EDV BP 83 56 - 104 mL    LV ESV A2C 66 mL    LV ESV A4C 74 mL    LV ESV BP 72 (A) 19 - 49 mL    TAPSE 1.5 (A) 1.7 cm    TR Peak Gradient 31 mmHg    TR Max Velocity 2.80 m/s    LV ESV Index BP 35 mL/m2    LV EDV Index BP 40 mL/m2    LV ESV Index A4C 36 mL/m2    LV EDV Index A4C 40 mL/m2    LV ESV Index A2C 32 mL/m2    LV EDV Index A2C 40 mL/m2    Est. RA Pressure 15 mmHg    RVSP 46 mmHg    IVC Proxmal 3.0 cm   EKG, 12 LEAD, INITIAL    Collection Time: 03/16/23  4:44 PM   Result Value Ref Range    Ventricular Rate 144 BPM    Atrial Rate 144 BPM    P-R Interval 176 ms    QRS Duration 134 ms    Q-T Interval 278 ms    QTC Calculation (Bezet) 430 ms    Calculated R Axis -143 degrees    Calculated T Axis -90 degrees    Diagnosis       Sinus tachycardia  Nonspecific intraventricular block  Possible Anterolateral infarct , age undetermined  When compared with ECG of 16-MAR-2023 16:45,  MANUAL COMPARISON REQUIRED, DATA IS UNCONFIRMED     BLOOD GAS, VENOUS    Collection Time: 03/16/23  4:55 PM   Result Value Ref Range    VENOUS PH 7.33 7.32 - 7.42      VENOUS PCO2 27 (L) 41 - 51 mmHg    VENOUS PO2 50 (H) 25 - 40 mmHg    VENOUS BICARBONATE 14 (L) 23 - 28 mmol/L    VENOUS BASE DEFICIT 10.5 mmol/L    VENOUS O2 SATURATION 83 65 - 88 %    O2 FLOW RATE 15 L/min    FIO2 100 %    MODE Non rebreather      Sample source VENOUS (BSR PULMONARY)     SAMPLES BEING HELD    Collection Time: 03/16/23  4:56 PM   Result Value Ref Range    SAMPLES BEING HELD 1red,1blu,1sst     COMMENT        Add-on orders for these samples will be processed based on acceptable specimen integrity and analyte stability, which may vary by analyte.        I have reviewed the following all pertinent labs, microbiology data, radiology imaging, and home medications for my assessment     We will follow patient. Please dont hesitate to call with any questions.     Michele Borges NP, PABrianC  [facility]      [facility]  [address] [address]  Phone: [phone]     [phone]   [facility]  [address]  [place] [address] [address] [facility]  Phone: [phone]     [phone]   [facility]  [address] [place], [address] [facility],[address]  [address]  Phone: [phone]     [phone]   TYRELL NOLAN [address], [address] [place] [address]  Phone: [phone]     [phone]

## 2023-03-16 NOTE — PROGRESS NOTES
1415: TRANSFER - IN REPORT:    Verbal report received from Parvmario Levy [id number], [address] (name) on [address] [facility]  being received from [place] in [facility]) for routine progression of care      Report consisted of patients Situation, Background, Assessment and   Recommendations(SBAR). Information from the following report(s) SBAR, Kardex, MAR, Recent Results, and Cardiac Rhythm V. paced  was reviewed with the receiving nurse. Opportunity for questions and clarification was provided. Assessment completed upon patients arrival to unit and care assumed. Per report pt responsive to painful stimuli only without opening eyes. RRT MAZIN Holland updated during her normal rounding regarding anticipation of pt and updated regarding current pt condition prior to transfer. Room prepared with emergency equipment at bedside. 1615: Pt arrived via medical transport ([facility]). Per medical transport pt continued to only be responsive to painful stimuli with moaning and without opening eyes;  and  on ride per transport. 1623: Pt arrived to unit, connected to monitor and VS obtained. BP 83/47 repeat SBP 70s , MD [place] at bedside, verbal order received for 2 small bottles of albumin. Placed pt in trendelenburg. MD Mica Toussaint updated regarding pt condition - minimal responsiveness to painful stimuli without opening eyes; labored, tachypnic, and abd breathing present. 1626: Pulse ox 50% on 4 L NC, placed on NRB, repositioned pt to increase HOB. RRT called. Sating > 90% on NRB. RRT MAZIN Holland, MD Mica Toussaint, and additional staff at bedside. Verbal orders received per MD Mica Toussaint for labs including lactic acid, blood cultures, ABG, and STAT CXR. Unable to obtain ABG per RT d/t hemolyzation, VVG obtained sucessfully. 1639: HR Vtach 145, pulse present. Defib pads placed on pt. EKG obtained per MD Mica Toussaint.      Continued pulse checks with sustained Vtach and SBP 60-80s - unresponsive to albumin. Verblal order received for amio bolus per MD Remy Ramirez. MD Remy Ramirez consulted intensivist.     1700: Intensivist at bedside and agreed to admit pt onto intensivist services. Verbal order received per intensivist to hold amio bolus for now. 1713: SBP 50, 250 cc bolus administered per protocol via RRT RN.       6807: TRANSFER - OUT REPORT:    Verbal report given to Janae Acharya RN (name) on [facility]  being transferred to CVICU (unit) for urgent transfer       Report consisted of patients Situation, Background, Assessment and   Recommendations(SBAR). Information from the following report(s) SBAR, Kardex, MAR, Recent Results, and Cardiac Rhythm Briana Abdi  was reviewed with the receiving nurse. Opportunity for questions and clarification was provided.       Patient transported with:   Nurse, tech, RRT RN, monitor, O2 - NRB

## 2023-03-16 NOTE — ROUTINE PROCESS
TRANSFER - OUT REPORT:    Verbal report given to Chuyita Avalos RN (name) on Ariana Acosta  being transferred to Cardiac Unit(unit) for change in patient condition(CHF)       Report consisted of patients Situation, Background, Assessment and   Recommendations(SBAR). Information from the following report(s) SBAR was reviewed with the receiving nurse. Lines:   Peripheral IV 03/15/23 Posterior;Right Forearm (Active)        Opportunity for questions and clarification was provided.       Patient transported with:   Monitor  O2 @ 2 liters

## 2023-03-16 NOTE — ACP (ADVANCE CARE PLANNING)
Advance Care Planning   Healthcare Decision Maker:       Primary Decision Maker: Rubi العراقي - Daughter - [phone]    Primary Decision Maker: Dagmar Nyhan - Daughter - [phone]    Primary Decision Maker: Mihaela Trey - Daughter - [phone]    Tom Jain is the primary .      Click here to complete [address] including selection of the Healthcare Decision Maker Relationship (ie \"Primary\")

## 2023-03-16 NOTE — PROCEDURES
[facility]      Procedure Note - Intubation:   Performed by Deandre Hampton MD .   Staff Anesthesiologist/Intensivist    Diagnosis: Cardiac Arrest  Insertion Date: 16 March 2023 Time:5:45   Obtained Consent? no; emergent   Procedure Location:  ICU. Immediately prior to the procedure, the patient was reevaluated and found suitable for the planned procedure and any planned medications. Immediately prior to the procedure a time out was called to verify the correct patient, procedure, equipment, staff, and marking as appropriate. Medications given were none. A number 7.5 cuffed   ETT was placed to 22 cm at the teeth. Placement was evaluated by noting bilateral, symmetric breath sounds, good end-tidal CO2 detector color change , no breath sounds over stomach, and chest x-ray visualization. Attempts required: 1. Complications: none. Cricoid pressure was applied. .  The procedure was tolerated well.

## 2023-03-16 NOTE — ROUTINE PROCESS
Notified Dr. Santo Kaur of patient HR being 130. Dr. Santo Kaur ordered EKG stat, and new set of vitals.

## 2023-03-16 NOTE — PROGRESS NOTES
Problem: Pressure Injury - Risk of  Goal: *Prevention of pressure injury  Description: Document Isidro Scale and appropriate interventions in the flowsheet. Outcome: Progressing Towards Goal  Note: Pressure Injury Interventions:       Moisture Interventions: Absorbent underpads    Activity Interventions: Pressure redistribution bed/mattress(bed type)    Mobility Interventions: HOB 30 degrees or less    Nutrition Interventions: Document food/fluid/supplement intake    Friction and Shear Interventions: Apply protective barrier, creams and emollients                Problem: Falls - Risk of  Goal: *Absence of Falls  Description: Document Ferny Fall Risk and appropriate interventions in the flowsheet.   Outcome: Progressing Towards Goal  Note: Fall Risk Interventions:

## 2023-03-16 NOTE — PROGRESS NOTES
Discharge plan of care/case management plan validated with provider discharge order. Care Management Interventions  PCP Verified by CM: Yes (Seen by provider at [facility]. )  Mode of Transport at Discharge: [facility]  Transition of Care Consult (CM Consult): Discharge Planning, [address]: Child(christopher), Kayy  Confirm Follow Up Transport: Other (see comment) (nursing home will arrange)  The Plan for Transition of Care is Related to the Following Treatment Goals : Patient is a long term care resident at [facility]. She is being transferred to a higher level of care. Discharge Location  Patient Expects to be Discharged to[de-identified] Transferred to higher level of care    Reason for Admission:   Anasarca, CHF                 RUR Score:  21- high risk         PCP: First and Last name:  Guadalupe Peña MD- Patient is now being seen by the provider at [facility]. Name of Practice:   Are you a current patient: Yes/No:   Approximate date of last visit:    Can you do a virtual visit with your PCP:              Resources/supports as identified by patient/family:                   Top Challenges facing patient (as identified by patient/family and CM): Finances/Medication cost?   No issues                 Transportation? Eligible for Medicaid transportation              Support system or lack thereof? Daughters and nursing home. Living arrangements? Recently became a long term resident at [address]. Self-care/ADLs/Cognition? Requires assistance with all care. Patient has frequent hallucinations.            Current Advanced Directive/Advance Care Plan:  Full Code      Healthcare Decision Maker:   Click here to complete HealthCare Decision Makers including selection of the Healthcare Decision Maker Relationship (ie \"Primary\")      Primary Decision Maker: Davi Torres Daughter - [phone]    Primary Decision Maker: Baldemar Koch - Daughter - [phone]    Primary Decision Maker: Bernadette Chase - Daughter - [phone]    Payor Source Payor: Ford Angelucci / Plan: Farooq Lockett 8141 HMO / Product Type: Managed Care Medicare /                             Plan for utilizing home health:   N/A                  Transition of Care Plan:   Patient is being transferred to [facility] to a higher level of care.

## 2023-03-16 NOTE — ADT AUTH CERT NOTES
Patient Demographics    Patient Name   Julio C Elizondo   [id number] Legal Sex   Female    1950 Address   [address] [place] [phone] Phone   [phone] (Home)   [phone] (Mobile)     CSN:   [id number]     [address] [place] [address] Date: Admit Time Room Bed   Mar 15, 2023  8:45  [[id number]] 01 [[id number]]     Attending Providers    Provider Pager From To   Sharan East MD  03/15/23 03/15/23   Felix Pathak MD  03/15/23      Patient Contacts    Name Madi Daily Work  [address] Daughter [phone]  [phone]   Aurelia Casie Daughter [phone]     Allehao Rist Daughter   [phone]     Utilization Reviews       Heart Failure - Care Day 2 (3/16/2023) by Ivanai Wren RN       Review Entered Review Status   3/16/2023 1356 Completed      Criteria Review      Care Day: 2 Care Date: 3/16/2023 Level of Care:    Guideline Day 2    Level Of Care    (X) Floor    3/16/2023 13:56:21 EDT by Era Rota      REMOTE TELEMETRY BED    Clinical Status    ( ) * Hemodynamic stability    3/16/2023 13:56:21 EDT by Era Rota      97.7, HR  102-109, RR  24-26,  107/64, 92% 2 L NC    ( ) * Mental status at baseline    3/16/2023 13:56:21 EDT by Seema     (X) * No evidence of myocardial ischemia    (X) * Cardiac rate and rhythm acceptable    3/16/2023 13:56:21 EDT by Melvin Hippo PACED 100'S    ( ) * Oxygenation at baseline or improved    3/16/2023 13:56:21 EDT by Era Rota      92% 2 L NC    (X) * Pulmonary edema absent or improved    Activity    ( ) Advance activity as tolerated    3/16/2023 13:56:21 EDT by Era Rota      COMPLETE BEDREST    Routes    ( ) Taper parenteral medications    3/16/2023 13:56:21 EDT by Era Rota      NS 250ML IV ONCE, PROTONIX 40 MG IV Q12H, FENTANYL 100 MCG IV NOW, ZOFRAN 4 MG IV ONCE    ( ) Oral diet    3/16/2023 13:56:21 EDT by Era Rota      NPO SIPS OF WATER, NS 1000ML IV ONCE Interventions    (X) * Pulmonary catheter absent    (X) Possible electrolytes    3/16/2023 13:56:21 EDT by Benitez Stoddard      WBC  6.9, RBC  2.66, HGB  7.4 HCT  23.5, UA- 30 PROT, 4+ BACTERIA  , K  4.4,, C02-17,  CRT  3.30, BUN/CRT RATIO  30, EGFR  14  URINE RANDOM CRT 8.037, URINE RANDOM  PROT  63    ( ) Oxygen    3/16/2023 13:56:21 EDT by Benitez Stoddard      92% 2 L NC    Medications    (X) Diuretics    3/16/2023 13:56:21 EDT by Benitez Stoddard      LASIX 40 MG IV BID    (X) Beta-blocker    3/16/2023 13:56:21 EDT by Marcelle Spatz 5 MG IV ONCE    3/16/2023 13:56:21 EDT by Benitez Stoddard    Subject: Additional Clinical Information    TRANSFUSE 1 U PRBC'S    GI &NEPHROLOGY CONSULT    ECHO PENDING       * Milestone   Additional Notes   3/16      CARDIOLOGY CONSULT- On examination, she is lying in bed, lethargic on 2L O2 NC, with family at bedside. Unable to obtain ROS due to mentation. Records from hospital admission course thus far reviewed. Telemetry reviewed ST   1. NICM: EF 25-30% Troponin leak, not ACS range. EKG nonischemic. BNP is elevated. Generalized edema. Lasix held due to worsening kidney function. Recommend daily weights, strict I&Os, and 1500 ml fluid restrictions. Continue telemetry monitoring. Keep serum potassium between 4-5 and serum magnesium > 2.     2. Acute on chronic kidney disease: Cr 3.30, with anasarca. Nephrology consulted. Appreciate input on diuresis. 3. Anemia: HGB 7.4, plans for transfusion RBC. Primary managing. 4. Anasarca: Multifactorial, may be due to cardiomyopathy EF 25-30% and severe kidney diease. Condition guarded and can deteriorate    Recommend transfer to a higher level hospital. Plans for transfer to [facility].         Heart Failure - Care Day 1 (3/15/2023) by Candie Nunez RN       Review Entered Review Status   3/16/2023 0913 Completed      Criteria Review      Care Day: 1 Care Date: 3/15/2023 Level of Care:    Guideline Day 1    Level Of Care    (X) ICU or intermediate care or floor    3/16/2023 09:13:47 EDT by Wilbur Escalera      REMOTE TELEMETRY BED 98, HR  100-129, RR  17-20,  177/96,  97% RA    Clinical Status    (X) * Clinical Indications met    (X) Tachypnea, edema, and dyspnea    3/16/2023 09:13:47 EDT by Marc Alexandra    ( ) Possible hypoxia and hypotension    3/16/2023 09:13:47 EDT by Wilbur Escalera      177/96,  97% RA    Activity    (X) Initial bed rest    3/16/2023 09:13:47 EDT by Wilbur Escalera      COMPLETE BEDREST    Routes    (X) Parenteral medications    3/16/2023 09:13:47 EDT by Na Curtis 40 MG IV Q12H  ED MED - FENTANYL  100 MCG IV NOW, ZOFRAN 4 MG IV ONCE    (X) Oral hydration    3/16/2023 09:13:47 EDT by Wilbur Escalera      ED MED - NS 1000ML IV ONCE    (X) Oral diet    3/16/2023 09:13:47 EDT by Galileo, [address] [place] [address] DIET    Interventions    (X) ECG, CXR, ABG    3/16/2023 09:13:47 EDT by Wilbur Escalera      XR ABD  =Nonobstructive bowel gas pattern.   EKG-     Ventricular-paced complexes   No further analysis attempted due to paced rhythm   Baseline wander in lead(s) I,II,aVR,aVL,V3    (X) Cardiac biomarkers, BNP    3/16/2023 09:13:47 EDT by Wilbur Escalera      HIGH SENS TROP  80 / 81  PRO BNP  27,580    ( ) Oxygen    3/16/2023 09:13:47 EDT by Wilbur Escalera      RA    (X) Electrolytes    3/16/2023 09:13:47 EDT by Wilbur Escalera      WBC  6.9, NRBC  3.6, RBC  3.07, HGB  8.6 HCT  26.6, NEUT  89, PT  18.7 INR  1.6, , K  4.2, CL  110, C02-17, ANION GAP 18,  CRT  3.05, BUN/CRT RATIO  33, EGFR  16, ALB  2.4, GLOB  4.7  TSH  6.14    Medications    (X) IV diuretics    3/16/2023 09:13:47 EDT by Wilbur Escalera      LASIX  40 MG IV BID,    (X) Beta-blocker    3/16/2023 09:13:47 EDT by Wilbur Escalera      ED MED - LOPRESSOR  5 MG IV ONCE    3/16/2023 09:13:47 EDT by Wilbur Escalera    Subject: Additional Clinical Information    STOOL OCCULT BLOOD PENDING    CARDIOLOGY, NEPHROLOGY  & GI CONSULT       * Milestone   Additional Notes   CHEST CT -- Small bilateral pleural transudates, larger on the right   Right basilar subsegmental atelectasis   Incidental significant third spacing    Right posterior back soft tissue hematoma   Incidental cholelithiasis      CT ABD - 1. Moderate right and small left pleural effusions, trace ascites, and extensive   third spacing of edema/anasarca. Probable hematoma in the right posterior flank   soft tissues. 2. Colonic diverticulosis with no definite diverticulitis allowing for motion   artifact. 3. No nephrolithiasis or hydronephrosis. 4. Cholelithiasis. 5. Nonspecific air within the uterine fundus, correlate clinically. ED NOTE - 74 y.o. female presents with a complaint of abdominal painPhysical Exam   Vitals and nursing note reviewed. Constitutional:        General: She is not in acute distress. Appearance: She is not ill-appearing, toxic-appearing or diaphoretic. HENT:       Head: Normocephalic and atraumatic. Eyes:       Extraocular Movements: Extraocular movements intact. Pupils: Pupils are equal, round, and reactive to light. Cardiovascular:       Rate and Rhythm: Normal rate and regular rhythm. Pulmonary:       Effort: Pulmonary effort is normal.       Breath sounds: Normal breath sounds. Abdominal:       General: Bowel sounds are normal.       Palpations: Abdomen is soft. Tenderness: There is abdominal tenderness in the left lower quadrant. There is no guarding. Hernia: A hernia is present. Hernia is present in the ventral area. Skin:      General: Skin is warm and dry. Capillary Refill: Capillary refill takes less than 2 seconds. Findings: Erythema and rash present. Neurological:       General: No focal deficit present. Mental Status: She is alert. She is confused.        H&P - H&P - 74 y.o. female followed by Dr. Corwin Cross MD at [facility] Dr Elina Diaz MD electrophysiologist and [address] [place] [address] kidney specialist Dr. Francesca Schmitt for CKD and  has a past medical history of CHF (congestive heart failure) ([address] Chronic kidney disease, Diabetes ([address] Hypertension, Hypothyroidism, Menopause, Nonischemic cardiomyopathy ([address] and Supraventricular tachycardia ([facility] Presents from the nursing home after noting area where Trulicity was given in the right lower abdomen increased bruising/hematoma and to get evaluated. Patient unable to give history and most of the history was obtained from ER physician's note as well as family at bedside. Patient was last hospitalized at [facility] in December and at that time was found to have an EF of 25% and at that time was discharged on Lasix 40 mg oral twice daily and spironolactone as noted on review of nursing home [facility] that on 3 1 Lasix was changed to daily. Family noted increasing leg swelling and also was noted to have a weight which was checked once weekly at the nursing home of 213 pounds but currently patient is 225 pounds. Also family notes that patient drinks a lot of liquids but patient never complained to them about shortness of breath or chest pain but suddenly had experiencing abdominal pain which patient was unable to describe. There was no noted nausea or vomiting and family notes fairly large bowel movement this morning. On initial evaluation blood pressure was slightly elevated 177/96 with a heart rate of 109 O2 sat 97% on room air but on acute care it was noted that with increased activity patient's O2 sat dropped to 85% so was placed on 2 L. Patient initially was given a 1 L fluid bolus. Patient initial BUN/creatinine was 100/3.05 previous BUN/creatinine of 30/2. 14. Troponins x2 were in similar range of 80 and then 81. Last A1c in December was at 5.7 and current BNP was 27,580.   Hemoglobin noted to be 8.6 but was in the 11 range in December 2022. CT chest small b/l pleural change the dates larger on the right with right basilar subsegmental atelectasis also noted right posterior back soft tissue hematoma. CT abdomen pelvis also notes extensive anasarca and a probable hematoma in the right posterior flank soft tissue of 5.3 x 3.4 cm also notes colonic diverticulosis without diverticulitis no nephrolithiasis or hydronephrosis positive cholelithiasis. Initial BUN/creatinine was elevated at 100/3.04 and with noted anemia will need to check stool for occult blood and iron panel.   So patient was referred for admission to remote telemetry for acute on chronic systolic heart failure exacerbation, anasarca, LES on CKD   Assessment & Plan:       Acute on chronic systolic heart failure exacerbation   -Has noted EF of 25% in December 2022 and recently discharged with 40 mg of oral Lasix twice daily and spironolactone but noted on 3/1 to be decreased Lasix down to daily dosing possibly secondary to worsening BUN/creatinine   -BNP elevated 27,580   -Troponins x2 are negative   -Appreciate cardiology evaluation and input   -Lasix 40 mg IV twice daily   -Monitor vital signs and renal function closely   -Daily weights which patient noted to have increased weight from 213 pounds and currently is at 225 pounds   -Strict I's and O's   -Fluid restriction of 1.2 L/day       Anasarca   -As noted on CT of the abdomen pelvis with extensive anasarca   -Continue current IV diuresis   -Monitor closely       Abdominal pain   -Possibly secondary to the anasarca but otherwise CT of the abdomen pelvis was negative for any acute process   -Patient does have a history of GERD and with noted elevated BUN of 100 we will need to check for possible upper GI bleed   -Obtain GI consultation   -Obtain stool for occult blood   -Monitor hemoglobin closely   -IV Protonix twice daily       LES on CKD   -BUN/creatinine was 30/2.14 when discharged from  [facility] on 12/22 but currently BUN is elevated 100 and a creatinine of 3.05   -Also noted metabolic acidosis CO2 of 17 monitor closely with current IV diuresis   -Nephrology consulted and appreciate recommendations   -Monitor daily weight and strict I's and O's   -Obtain urinalysis and protein creatinine ratio       Hypothyroidism   -Chronic and continue home levothyroxine at 50 mcg daily   -Obtain TSH       Diabetes   -Last A1c in December 2022 was at 5.7   -Hold Trulicity dosing and placed on sliding scale with Accu-Cheks before meals and at bedtime       Dementia with behavioral disturbances   -Noted to be initially on 0.5 mg of risperidone twice daily but recently increased as of March 1 to 1 mg twice a daily but daughter does note that patient been having increasing sedation during the day   -We will place on 0.5 mg oral daily first dose now and then 1 mg at bedtime       Multiple stages of skin ulcers   -Patient bedbound at the nursing home and restarted previous wound care apply to bilateral lower extremities as well as sacral were stage II ulcer was noted   -Continue frequent turning and monitoring           GI prophylaxis   -IV Protonix twice daily       DVT prophylaxis   -Contraindicated secondary to noted hematoma as well as need to rule out an acute GI bleed as cause of her abdominal pain       CODE STATUS: Full code           Heart Failure - Clinical Indications for Admission to Inpatient Care by Moni Childers RN       Review Entered Review Status   3/16/2023 0902 Completed      Criteria Review      Clinical Indications for Admission to Inpatient Care    Most Recent : Bear Garner Most Recent Date: 3/16/2023 09:02:37 EDT    (X) Admission is indicated for  1 or more  of the following  (1) (2) (3) (4) (5) (6):       (X) Hemodynamic instability (3)       3/16/2023 09:02:37 EDT by Bear Garner         -129       (X) Anasarca or severe peripheral edema (eg, impairs ability to void or ambulate) (3) (7)       3/16/2023 09:02:37 EDT by Lugene Braver         CT ABD - Moderate right and small left pleural effusions, trace ascites, and extensive  third spacing of edema/anasarca     H&P Notes     H&P by Freda Valles MD at 03/15/23 3925 documented on ED to Hosp-Admission (Current) from 3/15/2023 in [address]    Author: Freda Valles MD Author Type: Physician Filed: 03/15/23 9636   Note Status: Signed Cosign: Cosign Not Required Date of Service: 03/15/23 1384   : Freda Valles MD (Physician)               Expand All Collapse All             History and Physical        Chief Complaints:          Chief Complaint   Patient presents with    Abdominal Pain            Subjective:      Sabrina Rose is a 74 y.o. female followed by Dr. Yamil Parikh MD at [facility] and rehab long-term care and Dr. Edith Bains cardiologist Dr Wendi Stern MD electrophysiologist and [address] [place] [address] kidney specialist Dr. Ada Llanos for CKD and  has a past medical history of CHF (congestive heart failure) ([address] Chronic kidney disease, Diabetes ([address] Hypertension, Hypothyroidism, Menopause, Nonischemic cardiomyopathy ([address] and Supraventricular tachycardia ([address] Presents from the nursing home after noting area where Trulicity was given in the right lower abdomen increased bruising/hematoma and to get evaluated. Patient unable to give history and most of the history was obtained from ER physician's note as well as family at bedside. Patient was last hospitalized at [facility] in December and at that time was found to have an EF of 25% and at that time was discharged on Lasix 40 mg oral twice daily and spironolactone as noted on review of nursing home [facility] that on 3 1 Lasix was changed to daily. Family noted increasing leg swelling and also was noted to have a weight which was checked once weekly at the nursing home of 213 pounds but currently patient is 225 pounds.   Also family notes that patient drinks a lot of liquids but patient never complained to them about shortness of breath or chest pain but suddenly had experiencing abdominal pain which patient was unable to describe. There was no noted nausea or vomiting and family notes fairly large bowel movement this morning. On initial evaluation blood pressure was slightly elevated 177/96 with a heart rate of 109 O2 sat 97% on room air but on acute care it was noted that with increased activity patient's O2 sat dropped to 85% so was placed on 2 L. Patient initially was given a 1 L fluid bolus. Patient initial BUN/creatinine was 100/3.05 previous BUN/creatinine of 30/2. 14. Troponins x2 were in similar range of 80 and then 81. Last A1c in December was at 5.7 and current BNP was 27,580. Hemoglobin noted to be 8.6 but was in the 11 range in 2022. CT chest small b/l pleural change the dates larger on the right with right basilar subsegmental atelectasis also noted right posterior back soft tissue hematoma. CT abdomen pelvis also notes extensive anasarca and a probable hematoma in the right posterior flank soft tissue of 5.3 x 3.4 cm also notes colonic diverticulosis without diverticulitis no nephrolithiasis or hydronephrosis positive cholelithiasis. Initial BUN/creatinine was elevated at 100/3.04 and with noted anemia will need to check stool for occult blood and iron panel.   So patient was referred for admission to remote telemetry for acute on chronic systolic heart failure exacerbation, anasarca, LES on CKD             Past Medical History:   Diagnosis Date    CHF (congestive heart failure) ([address]      Chronic kidney disease      Diabetes ([address]      Hypertension      Hypothyroidism      Menopause      Nonischemic cardiomyopathy ([address]      Supraventricular tachycardia ([address]       s/p ablation            Past Surgical History:   Procedure Laterality Date    HX  SECTION        HX HERNIA REPAIR        HX IMPLANTABLE CARDIOVERTER DEFIBRILLATOR Family History   Problem Relation Age of Onset    Diabetes Mother      Diabetes Father        Social History           Tobacco Use    Smoking status: Never    Smokeless tobacco: Never   Substance Use Topics    Alcohol use: Not on file               Prior to Admission medications    Medication Sig Start Date End Date Taking? Authorizing Provider   captopriL (CAPOTEN) 25 mg tablet captopril 25 mg tablet       Raheem Botello MD   dulaglutide (Trulicity) 1.5 MF/6.3 mL sub-q pen Trulicity 1.5 AF/8.1 mL subcutaneous pen injector   1.5 MG ONCE A WEEK SUBCUTANEOUSLY 30       Raheem Botello MD   hydrALAZINE (APRESOLINE) 10 mg tablet Take 1 Tablet by mouth two (2) times a day. 8/22/22 8/22/23   Raheem Botello MD   insulin degludec (TRESIBA) 200 unit/mL (3 mL) inpn pen Tresiba FlexTouch U-200 insulin 200 unit/mL (3 mL) subcutaneous pen       Raheem Botello MD   levothyroxine (SYNTHROID) 50 mcg tablet 1 tablet in the morning on an empty stomach       Raheem Botello MD   insulin lispro (HUMALOG) 100 unit/mL kwikpen Humalog KwikPen (U-100) Insulin 100 unit/mL subcutaneous       Raheem Botello MD   simvastatin (ZOCOR) 20 mg tablet simvastatin 20 mg tablet   TAKE 1 TABLET BY MOUTH EVERY DAY IN THE EVENING 90       Raheem Botello MD   Insulin Needles, Disposable, 31 gauge x 3/16\" ndle BD Ultra-Fine Mini Pen Needle 31 gauge x 3/16\"   AS DIRECTED CHECK SUGAR THREE TIMES A DAY THREE TIMES A DAY DIAGNOSIS  E11.22 30       Raheem Botello MD   pantoprazole (PROTONIX) 40 mg tablet Take 40 mg by mouth daily. Raheem Botello MD   allopurinoL (ZYLOPRIM) 300 mg tablet Take 300 mg by mouth daily. Raheem Botello MD   spironolactone (ALDACTONE) 25 mg tablet Take 25 mg by mouth daily. Raheem Botello MD   metoprolol succinate (TOPROL-XL) 50 mg XL tablet Take 50 mg by mouth daily. Raheem Botello MD   amiodarone (PACERONE) 100 mg tablet Take 100 mg by mouth daily.        Raheem Botello MD   furosemide (LASIX) 40 mg tablet Take 40 mg by mouth two (2) times a day. Other, MD Raheem           Allergies   Allergen Reactions    Contrast Agent [Iodine] Nausea and Vomiting         Review of Systems:  Review of Systems   Unable to perform ROS: Dementia   Psychiatric/Behavioral:  Negative for behavioral problems. Objective:      Vitals:  Visit Vitals  /71   Pulse 100   Temp 98 °F (36.7 °C)   Resp 17   Ht 5' 3\" (1.6 m)   Wt 102.1 kg (225 lb)   SpO2 94%   BMI 39.86 kg/m²         Physical Exam:  General: Alert, cooperative, no distress. Head:  Normocephalic, without obvious abnormality, atraumatic. Eyes:  Conjunctivae/corneas clear. Pupils equal, round, reactive to light. Extraocular movements intact. Lungs: Bilateral air entry diminished no wheezes rales rhonchi   chest wall: No tenderness or deformity. Heart:  Regular rate and rhythm, S1, S2 normal, no murmur, click, rub, or gallop. Abdomen:      Soft, distended mild epigastric tenderness . Bowel sounds normal. No masses. No organomegaly. Back:  No spine tenderness to palpation  Extremities:   Bilateral lower extremity edema pitting at the bilateral thigh stasis venous ulcers lower extremities  pulses: Symmetric all extremities. Skin:   Skin color, texture, turgor normal.   Lymph nodes: Cervical nodes normal.  Neurologic: CNII-XII intact. Normal strength, sensation, and reflexes throughout.         Labs:  Recent Results          Recent Results (from the past 24 hour(s))   EKG, 12 LEAD, INITIAL     Collection Time: 03/15/23  8:53 AM   Result Value Ref Range     Ventricular Rate 109 BPM     Atrial Rate 105 BPM     P-R Interval 77 ms     QRS Duration 180 ms     Q-T Interval 419 ms     QTC Calculation (Bezet) 565 ms     Calculated P Axis 5 degrees     Calculated R Axis -83 degrees     Calculated T Axis 90 degrees     Diagnosis           Ventricular-paced complexes  No further analysis attempted due to paced rhythm  Baseline wander in lead(s) I,II,aVR,aVL,V3      CBC WITH AUTOMATED DIFF Collection Time: 03/15/23  9:02 AM   Result Value Ref Range     WBC 6.9 4.4 - 11.3 K/uL     RBC 3.07 (L) 4.50 - 5.90 M/uL     HGB 8.6 (L) 13.5 - 17.5 g/dL     HCT 26.6 (L) 36 - 46 %     MCV 86.8 80 - 100 FL     MCH 28.0 (L) 31 - 34 PG     MCHC 32.3 31.0 - 36.0 g/dL     RDW 18.1 (H) 11.5 - 14.5 %     PLATELET 679 280 - 230 K/uL     MPV 8.3 6.5 - 11.5 FL     NRBC 3.6 (H) 0.0  WBC     ABSOLUTE NRBC 0.25 K/uL     NEUTROPHILS 89 (H) 42 - 75 %     LYMPHOCYTES 5 (L) 20.5 - 51.1 %     MONOCYTES 6 1.7 - 9.3 %     EOSINOPHILS 0 (L) 0.9 - 2.9 %     BASOPHILS 0 0.0 - 2.5 %     ABS. NEUTROPHILS 6.1 1.8 - 7.7 K/UL     ABS. LYMPHOCYTES 0.3 (L) 1.0 - 4.8 K/UL     ABS. MONOCYTES 0.4 0.2 - 2.4 K/UL     ABS. EOSINOPHILS 0.0 0.0 - 0.7 K/UL     ABS. BASOPHILS 0.0 0.0 - 0.2 K/UL   PROTHROMBIN TIME + INR     Collection Time: 03/15/23  9:02 AM   Result Value Ref Range     Prothrombin time 18.7 (H) 11.9 - 14.6 sec     INR 1.6 (H) 0.9 - 1.1     METABOLIC PANEL, COMPREHENSIVE     Collection Time: 03/15/23  9:02 AM   Result Value Ref Range     Sodium 145 136 - 145 mmol/L     Potassium 4.2 3.5 - 5.1 mmol/L     Chloride 110 (H) 97 - 108 mmol/L     CO2 17 (L) 21 - 32 mmol/L     Anion gap 18 (H) 5 - 15 mmol/L     Glucose 168 (H) 65 - 100 mg/dL      (H) 6 - 20 mg/dL     Creatinine 3.05 (H) 0.55 - 1.02 mg/dL     BUN/Creatinine ratio 33 (H) 12 - 20       eGFR 16 (L) >60 ml/min/1.73m2     Calcium 9.1 8.5 - 10.1 mg/dL     Bilirubin, total 0.9 0.2 - 1.0 mg/dL     AST (SGOT) 14 (L) 15 - 37 U/L     ALT (SGPT) 15 12 - 78 U/L     Alk.  phosphatase 134 (H) 45 - 117 U/L     Protein, total 7.1 6.4 - 8.2 g/dL     Albumin 2.4 (L) 3.5 - 5.0 g/dL     Globulin 4.7 (H) 2.0 - 4.0 g/dL     A-G Ratio 0.5 (L) 1.1 - 2.2     TROPONIN-HIGH SENSITIVITY     Collection Time: 03/15/23  9:02 AM   Result Value Ref Range     Troponin-High Sensitivity 80 (H) 0 - 51 ng/L   LIPASE     Collection Time: 03/15/23  9:02 AM   Result Value Ref Range     Lipase 40 (L) 73 - 393 U/L   LACTIC ACID     Collection Time: 03/15/23  9:02 AM   Result Value Ref Range     Lactic acid 1.8 0.4 - 2.0 mmol/L   MAGNESIUM     Collection Time: 03/15/23  9:02 AM   Result Value Ref Range     Magnesium 2.1 1.6 - 2.4 mg/dL   TROPONIN-HIGH SENSITIVITY     Collection Time: 03/15/23 11:26 AM   Result Value Ref Range     Troponin-High Sensitivity 81 (H) 0 - 51 ng/L   TSH 3RD GENERATION     Collection Time: 03/15/23 11:26 AM   Result Value Ref Range     TSH 6.14 (H) 0.36 - 3.74 uIU/mL   GLUCOSE, POC     Collection Time: 03/15/23  4:00 PM   Result Value Ref Range     Glucose (POC) 164 (H) 65 - 100 mg/dL     Performed by Karma Deck              Imaging:  XR ABD (KUB)     Result Date: 3/15/2023  Nonobstructive bowel gas pattern. CT CHEST WO CONT     Result Date: 3/15/2023  Small bilateral pleural transudates, larger on the right Right basilar subsegmental atelectasis Incidental significant third spacing Right posterior back soft tissue hematoma Incidental cholelithiasis     CT ABD PELV WO CONT     Result Date: 3/15/2023  1. Moderate right and small left pleural effusions, trace ascites, and extensive third spacing of edema/anasarca. Probable hematoma in the right posterior flank soft tissues. 2. Colonic diverticulosis with no definite diverticulitis allowing for motion artifact. 3. No nephrolithiasis or hydronephrosis. 4. Cholelithiasis. 5. Nonspecific air within the uterine fundus, correlate clinically.         Assessment & Plan:      Acute on chronic systolic heart failure exacerbation  -Has noted EF of 25% in December 2022 and recently discharged with 40 mg of oral Lasix twice daily and spironolactone but noted on 3/1 to be decreased Lasix down to daily dosing possibly secondary to worsening BUN/creatinine  -BNP elevated 27,580  -Troponins x2 are negative  -Appreciate cardiology evaluation and input  -Lasix 40 mg IV twice daily  -Monitor vital signs and renal function closely  -Daily weights which patient noted to have increased weight from 213 pounds and currently is at 225 pounds  -Strict I's and O's  -Fluid restriction of 1.2 L/day     Anasarca  -As noted on CT of the abdomen pelvis with extensive anasarca  -Continue current IV diuresis  -Monitor closely     Abdominal pain  -Possibly secondary to the anasarca but otherwise CT of the abdomen pelvis was negative for any acute process  -Patient does have a history of GERD and with noted elevated BUN of 100 we will need to check for possible upper GI bleed  -Obtain GI consultation  -Obtain stool for occult blood  -Monitor hemoglobin closely  -IV Protonix twice daily     LES on CKD  -BUN/creatinine was 30/2.14 when discharged from [facility] [address] on 12/22 but currently BUN is elevated 100 and a creatinine of 3.05  -Also noted metabolic acidosis CO2 of 17 monitor closely with current IV diuresis  -Nephrology consulted and appreciate recommendations  -Monitor daily weight and strict I's and O's  -Obtain urinalysis and protein creatinine ratio     Hypothyroidism  -Chronic and continue home levothyroxine at 50 mcg daily  -Obtain TSH     Diabetes  -Last A1c in December 2022 was at 5.7  -Hold Trulicity dosing and placed on sliding scale with Accu-Cheks before meals and at bedtime     Dementia with behavioral disturbances  -Noted to be initially on 0.5 mg of risperidone twice daily but recently increased as of March 1 to 1 mg twice a daily but daughter does note that patient been having increasing sedation during the day  -We will place on 0.5 mg oral daily first dose now and then 1 mg at bedtime     Multiple stages of skin ulcers  -Patient bedbound at the nursing home and restarted previous wound care apply to bilateral lower extremities as well as sacral were stage II ulcer was noted  -Continue frequent turning and monitoring        GI prophylaxis  -IV Protonix twice daily     DVT prophylaxis  -Contraindicated secondary to noted hematoma as well as need to rule out an acute GI bleed as cause of her abdominal pain     CODE STATUS: Full code  Patient is designated decision-maker is her daughter Domo Codizabela 75 minutes evaluating cording patient's admission to remote telemetry and expecting at least 1 to 2 days of acute care stay        Electronically signed by Silvia Maxwell MD on 3/15/2023 at 4:06 PM

## 2023-03-16 NOTE — PROGRESS NOTES
1626: Rapid response called overhead at this time, RRT responding. Rapid called for hypotension and increased heart rate. Patient just arrived as a transfer from [facility]. Patient was transferred d/t needing \"higher level of care\" per prior facility note. Orders received for labs, medications, EKG, and imaging. and second IV obtained. Patient had noticeable rhythm change on monitor and d-fib pads placed on patient at this time. Patient very unstable at this time, Dr. Lucina Hernandez in room and plan to consult ICU. ICU MD and NP at bedside to evaluate at this time and patient accepted to ICU. Per ICU NP hold amiodarone for now. Patient assigned bed on CVICU, Primary RN giving reports at this time.     Patient transported to CVI 28 with RRT and primary RN

## 2023-03-16 NOTE — ROUTINE PROCESS
Dr. Jonathan Major in to see patient, both daughters present at bedside. Blood transfusion consent reviewed and signed.

## 2023-03-16 NOTE — H&P
CRITICAL CARE NOTE      Name: Nicole Kern   : 1950   MRN: [id number]   Date: 3/16/2023      REASON FOR ICU ADMISSION:  HFrEF with exacerbation     PRINCIPAL ICU DIAGNOSIS     HFrEF w exac  LES on CKD3-4  Hypothyroidism  DM2  Dementia  Pressure ulcers  Hx HTN    BRIEF PATIENT SUMMARY     Ms Annie Zapien is a 66 yo female with a PMH of HFrEF 25% w pacemaker/ICD, SVT post ablation, DM2 GCM1v-3, dementia, pressure ulcers, and HTN who presented from her SNF at [facility] to an OSH on 3/15 with hypoxia, weight gain, worsening LE edema. She was found to have acute on chronic HF exacerbation c/b LES on CKD. She was transferred to [facility] on 3/16 for further management. Repeat TTE showed EF 13%. Upon transfer to [facility], she was hypotensive, tachycardic, lethargic and hypoxic. VBG did not reveal hypercapnia. She was transferred to the ICU where she required intubation and initiation of vasopressors.      COMPREHENSIVE ASSESSMENT & PLAN:SYSTEM BASED     24 HOUR EVENTS: As above    NEUROLOGICAL:     Acute toxic metabolic encephalopathy/Delirium: Multifactorial, due to uremia/acute illness  - Minimize benzos    PULMONOLOGY:     Acute hypoxic respiratory failure requiring mechanical ventilation: Atelectasis/pleural effusions; fluid overload in the setting of HF  - Diuresis with assistance of renal/HF teams  - ARDS volume ventilation 6mg/kg    CARDIOVASCULAR:     HFrEF w exacerbation  RV dysfunction  Shock, cardiogenic  - Cardiology consult  - Advanced HF consult  - Hemodynamic monitoring with Flowtrack  - Diuresis as able   - Epinephrine drip for MAP >65    GASTROINTESTINAL      NPO    RENAL/ELECTROLYTE/FLUIDS:     LES on CKD4 c/b uremia  - Renal consulted    ENDOCRINE:     IDDM2  - SSI    HEMATOLOGY/ONCOLOGY:     Acute on chronic anemia: Probable hematoma in the right posterior flank  soft tissues on CT  - Daily CBC  - Holding AC due to above hematoma    INFECTIOUS DISEASE:       ANTIBIOTICS TO DATE:    CULTURES TO DATE:      ICU DAILY CHECKLIST     Code Status:F  DVT Prophylaxis:SCDs  T/L/D: Tubes: Y  Lines: Y  Drains: N  SUP: Famotidine  Diet: NPO  Activity Level:Bedrest  ABCDEF Bundle/Checklist Completed:Yes  Disposition: ICU  Multidisciplinary Rounds Completed:  Pending  Goals of Care Discussion/Palliative: Pending  Patient/Family Updated: [address]   As above    [address] and Data: Reviewed 23  Medications: Reviewed 23  Imaging: Reviewed 23    Physical Exam  Vitals and nursing note reviewed. Constitutional:       Comments: Intubated   HENT:      Head: Normocephalic. Nose: Nose normal.      Mouth/Throat:      Mouth: Mucous membranes are moist.   Eyes:      Pupils: Pupils are equal, round, and reactive to light. Cardiovascular:      Rate and Rhythm: Regular rhythm. Tachycardia present. Pulses: Normal pulses. Comments: V paced  Pulmonary:      Comments: Intubated; diminished bases  Abdominal:      General: Abdomen is flat. Musculoskeletal:      Right lower leg: Edema present. Left lower leg: Edema present. Skin:     General: Skin is dry. Capillary Refill: Capillary refill takes less than 2 seconds. Neurological:      Comments: Withdraws to noxious stimuli in all 4 extremities ; does not follow commands; does not open eyes to voice        Visit Vitals  BP (!) 57/36   Pulse (!) 139   Resp 28        Temp (24hrs), Av.9 °F (36.6 °C), Min:97.3 °F (36.3 °C), Max:98.4 °F (36.9 °C)           Intake/Output:   No intake or output data in the 24 hours ending 23 1717    Imaging    03/15/23    ECHO ADULT FOLLOW-UP OR LIMITED 2023 3/16/2023    Interpretation Summary    Left Ventricle: Severely reduced left ventricular systolic function. EF by 2D Simpsons Biplane is 13%. Severe global hypokinesis present. Tricuspid Valve: Moderate to severe regurgitation. The estimated RVSP is 46 mmHg. IVC/SVC: IVC diameter is greater than 21 mm and decreases less than 50% during inspiration; therefore the estimated right atrial pressure is elevated (~15 mmHg). IVC is severely dilated. Limited study to assess LVEF and IVC. Signed by: Yolanda Dai MD on 3/16/2023  3:03 PM         CRITICAL CARE DOCUMENTATION  I had a face to face encounter with the patient, reviewed and interpreted patient data including clinical events, labs, images, vital signs, I/O's, and examined patient. I have discussed the case and the plan and management of the patient's care with the consulting services, the bedside nurses and the respiratory therapist.      NOTE OF PERSONAL INVOLVEMENT IN CARE   This patient has a high probability of imminent, clinically significant deterioration, which requires the highest level of preparedness to intervene urgently. I participated in the decision-making and personally managed or directed the management of the following life and organ supporting interventions that required my frequent assessment to treat or prevent imminent deterioration. I personally spent 60 minutes of critical care time. This is time spent at this critically ill patient's bedside actively involved in patient care as well as the coordination of care. This does not include any procedural time which has been billed separately.     Owen Zacarias NP   Critical Care Medicine  [facility]

## 2023-03-16 NOTE — PROCEDURES
Procedure Note - Central Venous Access:   Performed by Marilee Rodriguez MD  Diagnosis: cardiac arrest  Insertion Date: 03/16/23  Time:6:25 PM  Obtained Consent? no; emergent   Procedure Location:  ICU. Immediately prior to the procedure, the patient was reevaluated and found suitable for the planned procedure and any planned medications. Immediately prior to the procedure a time out was called to verify the correct patient, procedure, equipment, staff, and marking as appropriate. Central line Bundle:  Full sterile barrier precautions used. 7-Step Sterility Protocol followed. (cap, mask sterile gown, sterile gloves, large sterile sheet, hand hygiene, 2% chlorhexidine for cutaneous antisepsis)  5 mL 1% Lidocaine placed at insertion site. Patient positioned in Trendelenburg?no   The site was prepped with ChloraPrep. Catheter inserted into a new site. Using Seldinger technique a Arrow Triple Lumen CVC was placed in the Right via direct cannulation with 1 number of attempts for IV Access. Ultrasound Guidance was not utilized. There was good dark, non-pulsatile blood return in all ports. Femoral Site? yes. If Yes, reason femoral site was chosen: emergent access  Catheter secured. Biopatch/CHG bio-occlusive dressing in place? yes. The following complications were encountered: None. A follow-up chest x-ray was not ordered post procedure. The procedure was tolerated well.         Marilee Rodriguez MD  Critical Care Medicine  [facility]

## 2023-03-16 NOTE — PROGRESS NOTES
1725: Pt. Received from [facility] 95. Agonal breathing, non-rebreather on. Pt's sat's 80%. Pt. Not responsive to sternal pressure. Dr. Tiffany Ayala and Dr. Yvonne Mendez at bedside. 1740: Pt. Intubated. 1744: Code blue initiated. 1840: Pt.'s family at bedside. 1853: Nephrology, NP at bedside. Discussing dialysis with family. 1903: Critical lactic 4.4.    2000: Bedside and Verbal shift change report given to Selam Johnson RN (oncoming nurse) by Tenzin Erwin RN (offgoing nurse). Report included the following information SBAR, Kardex, Intake/Output, MAR, Recent Results, Cardiac Rhythm Sinus Tach, and Alarm Parameters .

## 2023-03-16 NOTE — CONSULTS
CARDIOLOGY CONSULTATION      REASON FOR CONSULT: CHF, anasarca    CHIEF COMPLAINT:  Abdominal pain/swelling    HISTORY OF PRESENT ILLNESS:  Olegario Silva is a 74y.o. year-old female with past medical history significant for CKD, HFrEF, DM, HTN, and hypothyroidism who was evaluated today due to abdominal pain. Information obtain via chart review as patient is very lethargic. Per nurse, risperidone has caused change in mentation. Daughter and sister reported patient was SOB, edematous, c/o abdominal pain and decreased appetite. On examination, she is lying in bed, lethargic on 2L O2 NC, with family at bedside. Unable to obtain ROS due to mentation. Records from hospital admission course thus far reviewed. Telemetry reviewed ST    INPATIENT MEDICATIONS:  Home medications reviewed.     Current Facility-Administered Medications:     0.9% sodium chloride infusion 250 mL, 250 mL, IntraVENous, PRN, Rashi Stevens MD    sodium chloride (NS) flush 5-40 mL, 5-40 mL, IntraVENous, Q8H, Rashi Stevens MD, 10 mL at 03/16/23 0628    sodium chloride (NS) flush 5-40 mL, 5-40 mL, IntraVENous, PRN, Rashi Stevens MD    acetaminophen (TYLENOL) tablet 650 mg, 650 mg, Oral, Q6H PRN **OR** acetaminophen (TYLENOL) suppository 650 mg, 650 mg, Rectal, Q6H PRN, Rashi Stevens MD    polyethylene glycol (MIRALAX) packet 17 g, 17 g, Oral, DAILY PRN, Rashi Stevens MD    ondansetron (ZOFRAN ODT) tablet 4 mg, 4 mg, Oral, Q8H PRN **OR** ondansetron (ZOFRAN) injection 4 mg, 4 mg, IntraVENous, Q6H PRN, Rashi Stevens MD    [Held by provider] furosemide (LASIX) injection 40 mg, 40 mg, IntraVENous, BID, Rashi Stevens MD, 40 mg at 03/15/23 2141    amiodarone (CORDARONE) tablet 100 mg, 100 mg, Oral, DAILY, Rashi Stevens MD    metoprolol succinate (TOPROL-XL) XL tablet 50 mg, 50 mg, Oral, DAILY, Rashi Stevens MD    glucose chewable tablet 16 g, 4 Tablet, Oral, PRN, Starr Fuentes MD dextrose (D50W) injection syrg 12.5-25 g, 25-50 mL, IntraVENous, PRN, Rashi Stevens MD    glucagon (GLUCAGEN) injection 1 mg, 1 mg, IntraMUSCular, PRN, Rashi Stevens MD    insulin lispro (HUMALOG) injection, , SubCUTAneous, AC&HS, Rashi Stevens MD, 2 Units at 03/15/23 1618    atorvastatin (LIPITOR) tablet 10 mg, 10 mg, Oral, QHS, Rashi Stevens MD, 10 mg at 03/15/23 2141    [Held by provider] risperiDONE (RisperDAL) tablet 0.5 mg, 0.5 mg, Oral, DAILY, Rashi Stevens MD, 0.5 mg at 03/15/23 1637    [Held by provider] risperiDONE (RisperDAL) tablet 1 mg, 1 mg, Oral, QHS, Rashi Stevens MD, 1 mg at 03/15/23 2141    levothyroxine (SYNTHROID) tablet 50 mcg, 50 mcg, Oral, ACB, Rashi Stevens MD    pantoprazole (PROTONIX) 40 mg in 0.9% sodium chloride 10 mL injection, 40 mg, IntraVENous, Q12H, Rashi Stevens MD, 40 mg at 03/15/23 2141    honey (Rawland Contes) topical paste, , Topical, DAILY, Rashi Stevens MD     ALLERGIES:  Allergies reviewed with the patient,  Allergies   Allergen Reactions    Contrast Agent [Iodine] Nausea and Vomiting     REVIEW OF SYSTEMS:  Complete review of systems performed, pertinents noted above, all other systems are negative. Physical examination:  Lethargic  Heart is tachycardic, rate and rhythm. Normal S1, S2, Murmur present  Lungs are diminished bilaterally. Breathing stable  Abdomen is soft, nontender, normal bowel sounds. Extremities have moderate edema. BLE with dressing d/t weeping. Visit Vitals  BP (!) 102/52   Pulse (!) 102   Temp 98 °F (36.7 °C)   Resp 24   Ht 5' 3\" (1.6 m)   Wt 105.6 kg (232 lb 11.2 oz)   SpO2 92%   BMI 41.22 kg/m²       Recent labs results and imaging reviewed. Discussed case with Dr. Keith Pete and our impression and recommendations are as follows:  NICM: EF 25-30% Troponin leak, not ACS range. EKG nonischemic. BNP is elevated. Generalized edema. Lasix held due to worsening kidney function.  Recommend daily weights, strict I&Os, and 1500 ml fluid restrictions. Continue telemetry monitoring. Keep serum potassium between 4-5 and serum magnesium > 2. Acute on chronic kidney disease: Cr 3.30, with anasarca. Nephrology consulted. Appreciate input on diuresis. Anemia: HGB 7.4, plans for transfusion RBC. Primary managing. Anasarca: Multifactorial, may be due to cardiomyopathy EF 25-30% and severe kidney diease. Condition guarded and can deteriorate   Recommend transfer to a higher level hospital. Plans for transfer to [facility]. Thank you for involving us in the care of this patient. Please do not hesitate to call me or Dr. Maria L Gonzalez if additional questions arise.     [address]  3/16/2023

## 2023-03-16 NOTE — DISCHARGE SUMMARY
Physician Discharge Summary       Patient: Dominique Dewitt MRN: [id number]     YOB: 1950  Age: 74 y.o. Sex: female    PCP: Mariana Villalobos MD    Allergies: Contrast agent [iodine]    Admit date: 3/15/2023  Admitting Provider: Fabio Randhawa MD    Discharge date: 3/16/2023  Discharging Provider: Fabio Randhawa MD    * Admission Diagnoses:   Anasarca [R60.1]  CHF (congestive heart failure) ([facility] [I50.9]  LES (acute kidney injury) ([facility] [W77.8]  Metabolic acidosis [S92.80]  Anemia [D64.9]  Diabetes ([facility] [E11.9]  Pressure ulcers of skin of multiple topographic sites [L89.90]      * Discharge Diagnoses:    Hospital Problems as of 3/16/2023 Date Reviewed: 2/1/2023            Codes Class Noted - Resolved POA    Anasarca ICD-10-CM: R60.1  ICD-9-CM: 782.3  3/15/2023 - Present Unknown        * (Principal) CHF (congestive heart failure) ([facility] ICD-10-CM: I50.9  ICD-9-CM: 428.0  3/15/2023 - Present Unknown        Diabetes mellitus type 2, controlled ([facility] ICD-10-CM: E11.9  ICD-9-CM: 250.00  3/15/2023 - Present Unknown        Hypothyroid ICD-10-CM: E03.9  ICD-9-CM: 244.9  3/15/2023 - Present Unknown        Pressure ulcers of skin of multiple topographic sites ICD-10-CM: L89.90  ICD-9-CM: 707.00, 707.20  3/15/2023 - Present Unknown        HTN (hypertension) ICD-10-CM: I10  ICD-9-CM: 401.9  3/15/2023 - Present Unknown        Diabetes ([facility] ICD-10-CM: E11.9  ICD-9-CM: 250.00  3/15/2023 - Present Unknown        Metabolic acidosis EIU-62-KN: E87.20  ICD-9-CM: 276.2  3/15/2023 - Present Unknown        Anemia ICD-10-CM: D64.9  ICD-9-CM: 285.9  3/15/2023 - Present Unknown        LES (acute kidney injury) ([facility] ICD-10-CM: N17.9  ICD-9-CM: 584.9  3/15/2023 - Present Unknown             * Hospital Course:    As per admitting provider on 3/15:  Dominique Dewitt is a 74 y.o. female followed by Dr. Maddy George MD at [facility] and rehab long-term care and Dr. Jcarlos Emmanuel cardiologist Dr Anita Kwok MD electrophysiologist and [address] [place] [address] kidney specialist Dr. Yaneli Quinones for CKD and  has a past medical history of CHF (congestive heart failure) ([address] Chronic kidney disease, Diabetes ([address] Hypertension, Hypothyroidism, Menopause, Nonischemic cardiomyopathy ([address] and Supraventricular tachycardia ([address] Presents from the nursing home after noting area where Trulicity was given in the right lower abdomen increased bruising/hematoma and to get evaluated. Patient unable to give history and most of the history was obtained from ER physician's note as well as family at bedside. Patient was last hospitalized at [facility] in December and at that time was found to have an EF of 25% and at that time was discharged on Lasix 40 mg oral twice daily and spironolactone as noted on review of nursing home [facility] that on 3 1 Lasix was changed to daily. Family noted increasing leg swelling and also was noted to have a weight which was checked once weekly at the nursing home of 213 pounds but currently patient is 225 pounds. Also family notes that patient drinks a lot of liquids but patient never complained to them about shortness of breath or chest pain but suddenly had experiencing abdominal pain which patient was unable to describe. There was no noted nausea or vomiting and family notes fairly large bowel movement this morning. On initial evaluation blood pressure was slightly elevated 177/96 with a heart rate of 109 O2 sat 97% on room air but on acute care it was noted that with increased activity patient's O2 sat dropped to 85% so was placed on 2 L. Patient initially was given a 1 L fluid bolus. Patient initial BUN/creatinine was 100/3.05 previous BUN/creatinine of 30/2. 14. Troponins x2 were in similar range of 80 and then 81. Last A1c in December was at 5.7 and current BNP was 27,580. Hemoglobin noted to be 8.6 but was in the 11 range in December 2022.  CT chest small b/l pleural change the dates larger on the right with right basilar subsegmental atelectasis also noted right posterior back soft tissue hematoma. CT abdomen pelvis also notes extensive anasarca and a probable hematoma in the right posterior flank soft tissue of 5.3 x 3.4 cm also notes colonic diverticulosis without diverticulitis no nephrolithiasis or hydronephrosis positive cholelithiasis. Initial BUN/creatinine was elevated at 100/3.04 and with noted anemia will need to check stool for occult blood and iron panel. So patient was referred for admission to remote telemetry for acute on chronic systolic heart failure exacerbation, anasarca, LES on CKD    Acute on chronic systolic heart failure exacerbation  -Has noted EF of 25% in December 2022 and recently discharged with 40 mg of oral Lasix twice daily and spironolactone but noted on 3/1 to be decreased Lasix down to daily dosing possibly secondary to worsening BUN/creatinine  -BNP elevated 27,580  -Troponins x2 are negative  -Appreciate cardiology evaluation and input and also feels his troponins are leak but does also agree that patient needs higher level care and possibly with her kidney status intervention with temporary dialysis to relieve some of the fluid overload. 2D echo was performed limited and found that EF prelim was similar to December 2020 20 to 25% but IVC is extremely dilated. Patient does have episodes of increased heart rate but very transiently. Does have defibrillator in place.   Initially patient's was attempted Lasix 40 mg IV twice daily and received 1 dose on 3/15 but/ 316 dose was held and will reevaluate after transfusion of blood and at that time would have better/improved blood pressure to tolerate 40 mg of IV Lasix dosing and further recommendations via cardiology and nephrology at receiving facility  -Monitor vital signs and renal function closely  -Daily weights which patient noted to have increased weight from 213 pounds and currently is at 225 pounds  -Strict I's and O's  -Fluid restriction of 1.2 L/day     Anasarca  -As noted on CT of the abdomen pelvis with extensive anasarca  -Continue current IV diuresis and given dose overnight but with decreased blood pressure and worsening renal function we will hold and reevaluate after blood transfusion and give dose of 40 mg of IV Lasix once transfusion is completed       Abdominal pain  -Possibly secondary to the anasarca but otherwise CT of the abdomen pelvis was negative for any acute process  -Patient does have a history of GERD and with noted elevated BUN of 100 we will need to check for possible upper GI bleed  -Obtain GI consultation  -Obtain stool for occult blood and still pending at this time  -IV Protonix twice daily  - hg decreased from 8.6 down to 7.4 on 3/16 and repeat is 7.7 but with her hx of nonischemic cardiomyopathy ,, discussed with patient ERIKA / Steven Walker and explained risks and benefits and was in agreement for transfusion of blood for 1 unit so patient was typed and crossed and currently 1 unit is being transfused and will have repeat blood work with CMP, CBC done at 5 PM  -Made n.p.o. LES on CKD  -BUN/creatinine was 30/2.14 when discharged from [address] on 12/22 but currently BUN is elevated 100 and a creatinine of 3.05 continues to be elevated at 100/3.15 with noted minimal urine output of 125 cc overnight after being given 40 mg of IV Lasix.  -Also noted metabolic acidosis CO2 of 17 and continues similar on repeat labs  -Nephrology consulted and appreciate recommendations pending at this time  -Monitor daily weight and strict I's and O's  -Bladder scan was obtained and showed little over 100 cc of retention but was having improved urine output since overnight but still decreased and UA shows 4+ bacteria but negative nitrites or leukocytes.   Protein creatinine ratio obtained and is at 0.8  With patient's continued fluid overload and limited echo showing similar reduced EF about 25% but does show dilated IVC posterior related to worsening renal function and may need he nephrology input for need for temporary dialysis and transfer is a possibility of patient needing initiated on  dialysis    Hypothyroidism  -Chronic and continue home levothyroxine at 50 mcg daily  -TSH elevated 6.14 follow free T4     Diabetes  -Last A1c in December 2022 was at 5.7  -Hold Trulicity dosing and placed on sliding scale with Accu-Cheks before meals and at bedtime     Dementia with behavioral disturbances  -Noted to be initially on 0.5 mg of risperidone twice daily but recently increased as of March 1 to 1 mg twice a daily but daughter does note that patient been having increasing sedation during the day  -We will place on 0.5 mg oral daily first dose now and then 1 mg at bedtime  - continues to be lethargic but does react to painful stimuli as well as to attempting of blood pressure readings patient pulls back and gets irritated with any kind of mouth care and kept her eyes shut even while I was attempting to open them to check her pupils    Multiple stages of skin ulcers  -Patient bedbound at the nursing home and restarted previous wound care apply to bilateral lower extremities as well as sacral were stage II ulcer was noted  -Continue frequent turning and monitoring        GI prophylaxis  -IV Protonix twice daily     DVT prophylaxis  -Contraindicated secondary to noted hematoma as well as need to rule out an acute GI bleed as cause of her abdominal pain     CODE STATUS: Full code  Patient is designated decision-maker is her daughter Jerson Bailon    3/16: 12:15pm : Discussed the case with hospitalist on-call Dr. Lyda Canavan MD was kind enough to accept the patient to transfer to [facility] for higher level care secondary to her CHF/anasarca, acute renal failure as well as decreased urine output and anemia possibly upper GI bleed.     * Procedures:   * No surgery found *        Consults:   Cardiology, Nephrology  and GI pending   CARDIOLOGY CONSULTATION REASON FOR CONSULT: CHF, anasarca     CHIEF COMPLAINT:  Abdominal pain/swelling     HISTORY OF PRESENT ILLNESS:  Alfred Pineda is a 74y.o. year-old female with past medical history significant for CKD, HFrEF, DM, HTN, and hypothyroidism who was evaluated today due to abdominal pain. Information obtain via chart review as patient is very lethargic. Per nurse, risperidone has caused change in mentation. Daughter and sister reported patient was SOB, edematous, c/o abdominal pain and decreased appetite. On examination, she is lying in bed, lethargic on 2L O2 NC, with family at bedside. Unable to obtain ROS due to mentation. Records from hospital admission course thus far reviewed. Telemetry reviewed ST     INPATIENT MEDICATIONS:  Home medications reviewed.     Current Facility-Administered Medications:     0.9% sodium chloride infusion 250 mL, 250 mL, IntraVENous, PRN, Rashi Stevens MD    sodium chloride (NS) flush 5-40 mL, 5-40 mL, IntraVENous, Q8H, Rashi Stevens MD, 10 mL at 03/16/23 0628    sodium chloride (NS) flush 5-40 mL, 5-40 mL, IntraVENous, PRN, Rashi Stevens MD    acetaminophen (TYLENOL) tablet 650 mg, 650 mg, Oral, Q6H PRN **OR** acetaminophen (TYLENOL) suppository 650 mg, 650 mg, Rectal, Q6H PRN, Rashi Stevens MD    polyethylene glycol (MIRALAX) packet 17 g, 17 g, Oral, DAILY PRN, Rashi Stevens MD    ondansetron (ZOFRAN ODT) tablet 4 mg, 4 mg, Oral, Q8H PRN **OR** ondansetron (ZOFRAN) injection 4 mg, 4 mg, IntraVENous, Q6H PRN, Rashi Stevens MD    [Held by provider] furosemide (LASIX) injection 40 mg, 40 mg, IntraVENous, BID, Rashi Stevens MD, 40 mg at 03/15/23 2141    amiodarone (CORDARONE) tablet 100 mg, 100 mg, Oral, DAILY, Rashi Stevens MD    metoprolol succinate (TOPROL-XL) XL tablet 50 mg, 50 mg, Oral, DAILY, Rashi Stevens MD    glucose chewable tablet 16 g, 4 Tablet, Oral, PRN, Rashi Stevens MD    dextrose (D50W) injection syrg 12.5-25 g, 25-50 mL, IntraVENous, PRN, Rashi Stevens MD    glucagon (GLUCAGEN) injection 1 mg, 1 mg, IntraMUSCular, PRN, Rashi Stevens MD    insulin lispro (HUMALOG) injection, , SubCUTAneous, AC&HS, Rashi Stevens MD, 2 Units at 03/15/23 1618    atorvastatin (LIPITOR) tablet 10 mg, 10 mg, Oral, QHS, Rashi Stevens MD, 10 mg at 03/15/23 2141    [Held by provider] risperiDONE (RisperDAL) tablet 0.5 mg, 0.5 mg, Oral, DAILY, Rashi Stevens MD, 0.5 mg at 03/15/23 1637    [Held by provider] risperiDONE (RisperDAL) tablet 1 mg, 1 mg, Oral, QHS, Rashi Stevens MD, 1 mg at 03/15/23 2141    levothyroxine (SYNTHROID) tablet 50 mcg, 50 mcg, Oral, ACB, Rashi Stevens MD    pantoprazole (PROTONIX) 40 mg in 0.9% sodium chloride 10 mL injection, 40 mg, IntraVENous, Q12H, Rashi Stevens MD, 40 mg at 03/15/23 2141    honey (Zenovia Junes) topical paste, , Topical, DAILY, Rashi Stevens MD      ALLERGIES:  Allergies reviewed with the patient,       Allergies   Allergen Reactions    Contrast Agent [Iodine] Nausea and Vomiting     REVIEW OF SYSTEMS:  Complete review of systems performed, pertinents noted above, all other systems are negative. Physical examination:  Lethargic  Heart is tachycardic, rate and rhythm. Normal S1, S2, Murmur present  Lungs are diminished bilaterally. Breathing stable  Abdomen is soft, nontender, normal bowel sounds. Extremities have moderate edema. BLE with dressing d/t weeping. Visit Vitals  BP (!) 102/52   Pulse (!) 102   Temp 98 °F (36.7 °C)   Resp 24   Ht 5' 3\" (1.6 m)   Wt 105.6 kg (232 lb 11.2 oz)   SpO2 92%   BMI 41.22 kg/m²         Recent labs results and imaging reviewed. Discussed case with Dr. Eliseo Novoa and our impression and recommendations are as follows:  NICM: EF 25-30% Troponin leak, not ACS range. EKG nonischemic. BNP is elevated. Generalized edema. Lasix held due to worsening kidney function.  Recommend daily weights, strict I&Os, and 1500 ml fluid restrictions. Continue telemetry monitoring. Keep serum potassium between 4-5 and serum magnesium > 2. Acute on chronic kidney disease: Cr 3.30, with anasarca. Nephrology consulted. Appreciate input on diuresis. Anemia: HGB 7.4, plans for transfusion RBC. Primary managing. Anasarca: Multifactorial, may be due to cardiomyopathy EF 25-30% and severe kidney diease. Condition guarded and can deteriorate   Recommend transfer to a higher level hospital. Plans for transfer to [facility]. Thank you for involving us in the care of this patient. Please do not hesitate to call me or Dr. Marti Tomas if additional questions arise. Ina Kim, FN  3/16/2023         Vitals Last 24 Hours:  Patient Vitals for the past 24 hrs:   Temp Pulse Resp BP SpO2   03/16/23 0756 98 °F (36.7 °C) (!) 115 22 104/74 99 %   03/16/23 0750 -- -- -- -- 99 %   03/16/23 0439 98.4 °F (36.9 °C) (!) 103 18 111/68 95 %   03/16/23 0400 -- (!) 103 -- -- --   03/15/23 2359 98.4 °F (36.9 °C) (!) 101 18 (!) 104/52 95 %   03/15/23 2101 97.7 °F (36.5 °C) (!) 130 18 110/66 94 %   03/15/23 2000 -- (!) 102 -- -- --   03/15/23 1943 97.3 °F (36.3 °C) (!) 127 18 123/86 91 %   03/15/23 1557 97.5 °F (36.4 °C) (!) 129 18 117/77 --   03/15/23 1409 -- 100 17 126/71 94 %   03/15/23 1125 -- 100 17 (!) 116/91 --        Discharge Exam:  General: lethargic but responsive to painful stimuli , stable o2 saturation on 2L NC maintaining airway   Head:  Normocephalic, without obvious abnormality, atraumatic. Eyes:  Conjunctivae/corneas clear. Pupils equal, round, reactive to light. Extraocular movements intact. Lungs:  diminished breath sounds bilaterally but no wheeze rales. Rhonchi   Chest wall: No tenderness or deformity. Heart:  Tachycardic Regular rate and rhythm, S1, S2 normal, no murmur, click, rub or gallop. Abdomen:  Soft, obese epigastric tenderness. Bowel sounds positive.  No guarding or rebound today   Extremities: Positive edema bilateral lower extremities all the way to the hip and abdomen  Pulses: 2+ and symmetric all extremities.   Skin: Skin color, texture, turgor normal. No rashes or lesions  Neurologic: History of dementia with behavioral issues currently lethargic but arousable to painful stimuli    Labs:  Recent Results (from the past 24 hour(s))   TROPONIN-HIGH SENSITIVITY    Collection Time: 03/15/23 11:26 AM   Result Value Ref Range    Troponin-High Sensitivity 81 (H) 0 - 51 ng/L   NT-PRO BNP    Collection Time: 03/15/23 11:26 AM   Result Value Ref Range    NT pro-BNP 27,580 (H) <125 pg/mL   TSH 3RD GENERATION    Collection Time: 03/15/23 11:26 AM   Result Value Ref Range    TSH 6.14 (H) 0.36 - 3.74 uIU/mL   GLUCOSE, POC    Collection Time: 03/15/23  4:00 PM   Result Value Ref Range    Glucose (POC) 164 (H) 65 - 100 mg/dL    Performed by [address] [place] [address], POC    Collection Time: 03/15/23  9:35 PM   Result Value Ref Range    Glucose (POC) 182 (H) 65 - 100 mg/dL    Performed by [address] [place] [address], BASIC    Collection Time: 03/15/23 10:06 PM   Result Value Ref Range    Sodium 146 (H) 136 - 145 mmol/L    Potassium 4.3 3.5 - 5.1 mmol/L    Chloride 112 (H) 97 - 108 mmol/L    CO2 17 (L) 21 - 32 mmol/L    Anion gap 17 (H) 5 - 15 mmol/L    Glucose 172 (H) 65 - 100 mg/dL     (H) 6 - 20 mg/dL    Creatinine 3.15 (H) 0.55 - 1.02 mg/dL    BUN/Creatinine ratio 32 (H) 12 - 20      eGFR 15 (L) >60 ml/min/1.73m2    Calcium 9.1 8.5 - 10.1 mg/dL   MAGNESIUM    Collection Time: 03/15/23 10:06 PM   Result Value Ref Range    Magnesium 2.0 1.6 - 2.4 mg/dL   URINALYSIS W/ RFLX MICROSCOPIC    Collection Time: 03/16/23  4:53 AM   Result Value Ref Range    Color Yellow/Straw      Appearance Clear Clear      Specific gravity 1.015 1.003 - 1.030      pH (UA) 5.5 5.0 - 8.0      Protein 30 (A) Negative mg/dL    Glucose Negative Negative mg/dL    Ketone Negative Negative mg/dL    Bilirubin Negative Negative      Blood Negative Negative      Urobilinogen 0.2 0.2 - 1.0 EU/dL    Nitrites Negative Negative      Leukocyte Esterase Negative Negative     PROTEIN/CREATININE RATIO, URINE    Collection Time: 03/16/23  4:53 AM   Result Value Ref Range    Protein, urine random 63 (H) 0.0 - 11.9 mg/dL    Creatinine, urine random 80.37 (A) No reference range has been established. mg/dL    Protein/Creat. urine Ratio 0.8     URINE MICROSCOPIC    Collection Time: 03/16/23  4:53 AM   Result Value Ref Range    WBC 0-4 0 - 5 /hpf    RBC 0-5 0 - 3 /hpf    Bacteria 4+ (A) Negative /hpf   METABOLIC PANEL, BASIC    Collection Time: 03/16/23  5:21 AM   Result Value Ref Range    Sodium 145 136 - 145 mmol/L    Potassium 4.4 3.5 - 5.1 mmol/L    Chloride 113 (H) 97 - 108 mmol/L    CO2 17 (L) 21 - 32 mmol/L    Anion gap 15 5 - 15 mmol/L    Glucose 152 (H) 65 - 100 mg/dL     (H) 6 - 20 mg/dL    Creatinine 3.30 (H) 0.55 - 1.02 mg/dL    BUN/Creatinine ratio 30 (H) 12 - 20      eGFR 14 (L) >60 ml/min/1.73m2    Calcium 9.0 8.5 - 10.1 mg/dL   MAGNESIUM    Collection Time: 03/16/23  5:21 AM   Result Value Ref Range    Magnesium 2.0 1.6 - 2.4 mg/dL   CBC W/O DIFF    Collection Time: 03/16/23  5:21 AM   Result Value Ref Range    WBC 6.9 4.4 - 11.3 K/uL    RBC 2.66 (L) 4.50 - 5.90 M/uL    HGB 7.4 (L) 13.5 - 17.5 g/dL    HCT 23.5 (L) 36 - 46 %    MCV 88.1 80 - 100 FL    MCH 28.0 (L) 31 - 34 PG    MCHC 31.7 31.0 - 36.0 g/dL    RDW 18.0 (H) 11.5 - 14.5 %    PLATELET 424 149 - 272 K/uL    MPV 9.3 6.5 - 11.5 FL    NRBC 5.5  WBC    ABSOLUTE NRBC 0.38 K/uL   GLUCOSE, POC    Collection Time: 03/16/23  7:47 AM   Result Value Ref Range    Glucose (POC) 148 (H) 65 - 100 mg/dL    Performed by Fior Torres          Imaging:  XR ABD (KUB)    Result Date: 3/15/2023  Nonobstructive bowel gas pattern.      CT CHEST WO CONT    Result Date: 3/15/2023  Small bilateral pleural transudates, larger on the right Right basilar subsegmental atelectasis Incidental significant third spacing Right posterior back soft tissue hematoma Incidental cholelithiasis    CT ABD PELV WO CONT    Result Date: 3/15/2023  1. Moderate right and small left pleural effusions, trace ascites, and extensive third spacing of edema/anasarca. Probable hematoma in the right posterior flank soft tissues. 2. Colonic diverticulosis with no definite diverticulitis allowing for motion artifact. 3. No nephrolithiasis or hydronephrosis. 4. Cholelithiasis. 5. Nonspecific air within the uterine fundus, correlate clinically. * Discharge Condition: critical  * Disposition:  [address] [place] [address] Av      Discharge Medications:  Current Discharge Medication List            * Follow-up Care/Patient Instructions:   Activity: Bedrest  Diet: N.p.o.      Follow-up Information    None         Spent 70 minutes evaluating and cording patient discharge/transfer to [address] [place] [address]    Signed:  Lynne Monique MD  3/16/2023  10:04 AM

## 2023-03-17 NOTE — PROGRESS NOTES
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 She was found to have acute on chronic HF exacerbation c/b LES on CKD. She was transferred to [facility] on 3/16 for further management. Repeat TTE showed EF 13%. Upon transfer to [facility], she was hypotensive, tachycardic, lethargic and hypoxic. VBG did not reveal hypercapnia. She was transferred to the ICU where she required intubation and initiation of vasopressors.      COMPREHENSIVE ASSESSMENT & PLAN:SYSTEM BASED     24 HOUR EVENTS: As above    NEUROLOGICAL:     Acute toxic metabolic encephalopathy/Delirium: Multifactorial, due to uremia/acute illness  - Minimize benzos    PULMONOLOGY:     Acute hypoxic respiratory failure requiring mechanical ventilation: Atelectasis/pleural effusions; fluid overload in the setting of HF  - Diuresis with assistance of renal/HF teams  - ARDS volume ventilation 6mg/kg  - continue mechanical ventilation    CARDIOVASCULAR:     HFrEF w exacerbation  RV dysfunction  Shock, cardiogenic  - Cardiology following  - Advanced HF consult  - Hemodynamic monitoring with Flowtrack  - Diuresis as able   - Epinephrine drip for MAP >65  - wean vasopressors as tolerated    GASTROINTESTINAL   -NPO  -GI prophylaxis     RENAL/ELECTROLYTE/FLUIDS:     LES on CKD4 c/b uremia  -   -Creatine 3.5    ENDOCRINE:     IDDM2  - SSI  -continue to monitor blood glucose levels  -maintain blood glucose levels between 140-180    HEMATOLOGY/ONCOLOGY:     Acute on chronic anemia: Probable hematoma in the right posterior flank  soft tissues on CT  - Hbg   - platelets     INFECTIOUS DISEASE:   -  10.9    ANTIBIOTICS TO DATE: zoysn    CULTURES TO DATE: Gram negative rods      ICU DAILY CHECKLIST     Code Status:F  DVT Prophylaxis:SCDs  T/L/D: Tubes: Y  Lines: Y  Drains: N  SUP: Famotidine  Diet: NPO  Activity Level:Bedrest  ABCDEF Bundle/Checklist Completed:Yes  Disposition: ICU  Multidisciplinary Rounds Completed:  Pending  Goals of Care Discussion/Palliative: Pending  Patient/Family Updated: [address] [facility] [address]   As above    SUBJECTIVE   Review of Systems   Unable to perform ROS: Intubated      OBJECTIVE     Labs and Data: Reviewed 03/17/23  Medications: Reviewed 03/17/23  Imaging: Reviewed 03/17/23    Physical Exam  Vitals and nursing note reviewed. Constitutional:       General: She is in acute distress. Appearance: She is obese. She is ill-appearing. Comments: Intubated   HENT:      Head: Normocephalic. Nose: Nose normal.      Mouth/Throat:      Mouth: Mucous membranes are moist.   Eyes:      Pupils: Pupils are equal, round, and reactive to light. Cardiovascular:      Rate and Rhythm: Regular rhythm. Tachycardia present. Pulses: Normal pulses. Comments: V paced  Pulmonary:      Effort: Respiratory distress present. Breath sounds: Wheezing and rales present. Comments: Intubated; diminished bases  Abdominal:      General: Abdomen is flat. Musculoskeletal:      Right lower leg: Edema present. Left lower leg: Edema present. Skin:     General: Skin is dry. Capillary Refill: Capillary refill takes less than 2 seconds. Neurological:      Comments:  Withdraws to noxious stimuli in all 4 extremities ; does not follow commands; does not open eyes to voice        Visit Vitals  BP (!) 99/54 (BP 1 Location: Left lower arm, BP Patient Position: At rest)   Pulse (!) 109   Temp 98.9 °F (37.2 °C)   Resp 16   Ht 5' 3\" (1.6 m)   Wt 106.2 kg (234 lb 2.1 oz)   SpO2 99%   BMI 41.47 kg/m²    O2 Flow Rate (L/min): 4 l/min O2 Device: Endotracheal tube, Ventilator Temp (24hrs), Av.9 °F (37.2 °C), Min:97.7 °F (36.5 °C), Max:99.3 °F (37.4 °C)           Intake/Output:     Intake/Output Summary (Last 24 hours) at 3/17/2023 1325  Last data filed at 3/17/2023 1200  Gross per 24 hour   Intake 2993.21 ml   Output 580 ml   Net 2413.21 ml       Imaging    03/15/23    ECHO ADULT FOLLOW-UP OR LIMITED 2023 3/16/2023    Interpretation Summary    Left Ventricle: Severely reduced left ventricular systolic function. EF by 2D Simpsons Biplane is 13%. Severe global hypokinesis present. Tricuspid Valve: Moderate to severe regurgitation. The estimated RVSP is 46 mmHg. IVC/SVC: IVC diameter is greater than 21 mm and decreases less than 50% during inspiration; therefore the estimated right atrial pressure is elevated (~15 mmHg). IVC is severely dilated. Limited study to assess LVEF and IVC. Signed by: Safia Ramon MD on 3/16/2023  3:03 PM         CRITICAL CARE DOCUMENTATION  I had a face to face encounter with the patient, reviewed and interpreted patient data including clinical events, labs, images, vital signs, I/O's, and examined patient. I have discussed the case and the plan and management of the patient's care with the consulting services, the bedside nurses and the respiratory therapist.      NOTE OF PERSONAL INVOLVEMENT IN CARE   This patient has a high probability of imminent, clinically significant deterioration, which requires the highest level of preparedness to intervene urgently. I participated in the decision-making and personally managed or directed the management of the following life and organ supporting interventions that required my frequent assessment to treat or prevent imminent deterioration. I personally spent 50 minutes of critical care time.   This is time spent at this critically ill patient's bedside actively involved in patient care as well as the coordination of care. This does not include any procedural time which has been billed separately.     Christopher Palomo MD   Critical Care Medicine  [facility]

## 2023-03-17 NOTE — PROGRESS NOTES
Nephrology Progress Note  Karishma Wilson  Date of Admission : 3/16/2023    CC: Follow up for LES       Assessment and Plan     LES on CKD3b/4  - secondary to ATN post cardiac arrest  - oligoanuric, rising Cr  - lasix 120mg IV x 1  - dialysis was discussed with the family overnight and they remain undecided  - if they wish to continue aggressive care, will need CRRT started  - will f/up with family today and CCM team    CKD3b/4:  - pt of SSKS, baseline Cr 1.8 to 2  - presumed 2/2 DM     S/p PEA arrest:  - per cards and CCM team     Acute on chronic systolic heart failure exacerbation  Acute hypoxic respiratory failure  - Echo with EF 13%, dilated IVC, moderate to severe tricuspid regurg    Metabolic acidosis  - from LES, lactic acidosis  - on bcb drip     Hypothyroidism   DM2  Dementia       Interval History:  Seen and examined. Minimal UOP. On levo and vaso. Cr up, acidosis worsening. Off sedation this AM, poorly responsive. Current Medications: all current  Medications have been eviewed in EPIC  Review of Systems: Pertinent items are noted in HPI.     Objective:  Vitals:    Vitals:    03/17/23 0600 03/17/23 0700 03/17/23 0727 03/17/23 0800   BP:       Pulse: 98 (!) 120 (!) 118 (!) 116   Resp: 15 13 20 16   Temp:       SpO2:   100% 100%   Weight: 106.2 kg (234 lb 2.1 oz)        Intake and Output:  03/17 0701 - 03/17 1900  In: 132.9 [I.V.:132.9]  Out: 25 [Urine:25]  03/15 1901 - 03/17 0700  In: 1913.8 [I.V.:1913.8]  Out: 500 [Urine:500]    Physical Examination:  Pt intubated    Yes   General: Poorly responsive  Neck:  Supple, no mass  Resp:  Lungs CTA B/L, no wheezing , normal respiratory effort  CV:  RRR,  no murmur or rub,2+ LE edema  GI:  Soft, NT, + Bowel sounds, no hepatosplenomegaly  Neurologic:  Poorly responsive off sedation  Psych:             unable to assess  Skin:  No Rash  :  Welch in place    []    High complexity decision making was performed  []    Patient is at high-risk of decompensation with multiple organ involvement    Lab Data Personally Reviewed: I have reviewed all the pertinent labs, microbiology data and radiology studies during assessment. Recent Labs     03/17/23  0300 03/17/23  0051 03/16/23  1806 03/16/23  1656 03/16/23  0521 03/15/23  2206 03/15/23  0902    146* 145 146* 145   < > 145   K 4.5 4.5 3.9 4.3 4.4   < > 4.2   * 121* 121* 120* 113*   < > 110*   CO2 11* 12* 14* 14* 17*   < > 17*   * 226* 130* 119* 152*   < > 168*   * 102* 102* 107* 100*   < > 100*   CREA 3.50* 3.43* 3.29* 3.47* 3.30*   < > 3.05*   CA 8.8 8.7 8.3* 9.0 9.0   < > 9.1   MG 2.2  --   --  2.2 2.0   < > 2.1   PHOS 5.8* 5.7*  --   --   --   --   --    ALB 2.2* 2.3* 2.0* 2.0*  --   --  2.4*   ALT 16  --  12 13  --   --  15   INR  --   --  1.6*  --   --   --  1.6*    < > = values in this interval not displayed.      Recent Labs     03/17/23  0300 03/16/23 1806 03/16/23  1656   WBC 10.9 3.3* 3.1*   HGB 9.4* 7.2* 8.2*   HCT 31.0* 23.7* 26.4*    131* 162     No results found for: SDES  Lab Results   Component Value Date/Time    Culture result: No growth 6 days 12/17/2022 08:50 PM    Culture result: No growth 6 days 12/17/2022 08:40 PM     Recent Results (from the past 24 hour(s))   TYPE & SCREEN    Collection Time: 03/16/23 10:00 AM   Result Value Ref Range    Crossmatch Expiration 03/19/2023,2356     ABO/Rh(D) O Positive     Antibody screen Negative     Unit number [id number]     Blood component type [facility]     Unit division 00     Status of unit Issued,final     TRANSFUSION STATUS Ok to transfuse     Crossmatch result Compatible    HGB & HCT    Collection Time: 03/16/23 10:00 AM   Result Value Ref Range    HGB 7.7 (L) 13.5 - 17.5 g/dL    HCT 24.4 (L) 36 - 46 %   GLUCOSE, POC    Collection Time: 03/16/23 11:54 AM   Result Value Ref Range    Glucose (POC) 135 (H) 65 - 100 mg/dL    Performed by Arianna ERVIN ADULT FOLLOW-UP OR LIMITED    Collection Time: 03/16/23  1:41 PM Result Value Ref Range    EF BP 13 (A) 55 - 100 %    LV Ejection Fraction A2C 20 %    LV Ejection Fraction A4C 10 %    LV EDV A2C 82 mL    LV EDV A4C 83 mL    LV EDV BP 83 56 - 104 mL    LV ESV A2C 66 mL    LV ESV A4C 74 mL    LV ESV BP 72 (A) 19 - 49 mL    TAPSE 1.5 (A) 1.7 cm    TR Peak Gradient 31 mmHg    TR Max Velocity 2.80 m/s    LV ESV Index BP 35 mL/m2    LV EDV Index BP 40 mL/m2    LV ESV Index A4C 36 mL/m2    LV EDV Index A4C 40 mL/m2    LV ESV Index A2C 32 mL/m2    LV EDV Index A2C 40 mL/m2    Est. RA Pressure 15 mmHg    RVSP 46 mmHg    IVC Proxmal 3.0 cm   EKG, 12 LEAD, INITIAL    Collection Time: 03/16/23  4:44 PM   Result Value Ref Range    Ventricular Rate 144 BPM    Atrial Rate 144 BPM    P-R Interval 176 ms    QRS Duration 134 ms    Q-T Interval 278 ms    QTC Calculation (Bezet) 430 ms    Calculated R Axis -143 degrees    Calculated T Axis -90 degrees    Diagnosis       WCT   probable  aflutter with 2 to 1 conduction and lbbb  No previous ECGs available       Confirmed by Mariana Loza ([id number]) on 3/17/2023 7:04:26 AM     BLOOD GAS, VENOUS    Collection Time: 03/16/23  4:55 PM   Result Value Ref Range    VENOUS PH 7.33 7.32 - 7.42      VENOUS PCO2 27 (L) 41 - 51 mmHg    VENOUS PO2 50 (H) 25 - 40 mmHg    VENOUS BICARBONATE 14 (L) 23 - 28 mmol/L    VENOUS BASE DEFICIT 10.5 mmol/L    VENOUS O2 SATURATION 83 65 - 88 %    O2 FLOW RATE 15 L/min    FIO2 100 %    MODE Non rebreather      Sample source VENOUS (BSR PULMONARY)     LACTIC ACID    Collection Time: 03/16/23  4:56 PM   Result Value Ref Range    Lactic acid 2.9 (HH) 0.4 - 2.0 MMOL/L   CBC W/O DIFF    Collection Time: 03/16/23  4:56 PM   Result Value Ref Range    WBC 3.1 (L) 3.6 - 11.0 K/uL    RBC 3.02 (L) 3.80 - 5.20 M/uL    HGB 8.2 (L) 11.5 - 16.0 g/dL    HCT 26.4 (L) 35.0 - 47.0 %    MCV 87.4 80.0 - 99.0 FL    MCH 27.2 26.0 - 34.0 PG    MCHC 31.1 30.0 - 36.5 g/dL    RDW 16.9 (H) 11.5 - 14.5 %    PLATELET 130 662 - 442 K/uL    MPV 12.1 8.9 - 12.9 FL    NRBC 31.9 (H) 0  WBC    ABSOLUTE NRBC 0.98 (H) 0.00 - 1.52 K/uL   METABOLIC PANEL, COMPREHENSIVE    Collection Time: 03/16/23  4:56 PM   Result Value Ref Range    Sodium 146 (H) 136 - 145 mmol/L    Potassium 4.3 3.5 - 5.1 mmol/L    Chloride 120 (H) 97 - 108 mmol/L    CO2 14 (LL) 21 - 32 mmol/L    Anion gap 12 5 - 15 mmol/L    Glucose 119 (H) 65 - 100 mg/dL     (H) 6 - 20 MG/DL    Creatinine 3.47 (H) 0.55 - 1.02 MG/DL    BUN/Creatinine ratio 31 (H) 12 - 20      eGFR 13 (L) >60 ml/min/1.73m2    Calcium 9.0 8.5 - 10.1 MG/DL    Bilirubin, total 1.3 (H) 0.2 - 1.0 MG/DL    ALT (SGPT) 13 12 - 78 U/L    AST (SGOT) 11 (L) 15 - 37 U/L    Alk. phosphatase 104 45 - 117 U/L    Protein, total 6.5 6.4 - 8.2 g/dL    Albumin 2.0 (L) 3.5 - 5.0 g/dL    Globulin 4.5 (H) 2.0 - 4.0 g/dL    A-G Ratio 0.4 (L) 1.1 - 2.2     TROPONIN-HIGH SENSITIVITY    Collection Time: 03/16/23  4:56 PM   Result Value Ref Range    Troponin-High Sensitivity 58 (H) 0 - 37 ng/L   SAMPLES BEING HELD    Collection Time: 03/16/23  4:56 PM   Result Value Ref Range    SAMPLES BEING HELD 1red,1blu,1sst     COMMENT        Add-on orders for these samples will be processed based on acceptable specimen integrity and analyte stability, which may vary by analyte.    MAGNESIUM    Collection Time: 03/16/23  4:56 PM   Result Value Ref Range    Magnesium 2.2 1.6 - 2.4 mg/dL   POC VENOUS BLOOD GAS    Collection Time: 03/16/23  6:02 PM   Result Value Ref Range    pH, venous (POC) 7.09 (LL) 7.32 - 7.42      pCO2, venous (POC) 28.7 (L) 41 - 51 MMHG    pO2, venous (POC) 54 (H) 25 - 40 mmHg    HCO3, venous (POC) 8.7 (L) 23.0 - 28.0 MMOL/L    sO2, venous (POC) 75.6 65 - 88 %    Base deficit, venous (POC) 19.4 mmol/L    Specimen type (POC) VENOUS BLOOD      Performed by Erik Thorpe     Critical value read back DR Zhane Peña    METABOLIC PANEL, COMPREHENSIVE    Collection Time: 03/16/23  6:06 PM   Result Value Ref Range    Sodium 145 136 - 145 mmol/L    Potassium 3.9 3.5 - 5.1 mmol/L    Chloride 121 (H) 97 - 108 mmol/L    CO2 14 (LL) 21 - 32 mmol/L    Anion gap 10 5 - 15 mmol/L    Glucose 130 (H) 65 - 100 mg/dL     (H) 6 - 20 MG/DL    Creatinine 3.29 (H) 0.55 - 1.02 MG/DL    BUN/Creatinine ratio 31 (H) 12 - 20      eGFR 14 (L) >60 ml/min/1.73m2    Calcium 8.3 (L) 8.5 - 10.1 MG/DL    Bilirubin, total 1.3 (H) 0.2 - 1.0 MG/DL    ALT (SGPT) 12 12 - 78 U/L    AST (SGOT) 7 (L) 15 - 37 U/L    Alk. phosphatase 91 45 - 117 U/L    Protein, total 5.7 (L) 6.4 - 8.2 g/dL    Albumin 2.0 (L) 3.5 - 5.0 g/dL    Globulin 3.7 2.0 - 4.0 g/dL    A-G Ratio 0.5 (L) 1.1 - 2.2     COAGULATION SCREEN    Collection Time: 03/16/23  6:06 PM   Result Value Ref Range    Fibrinogen 655 (H) 200 - 475 mg/dL    INR 1.6 (H) 0.9 - 1.1      Prothrombin time 16.6 (H) 9.0 - 11.1 sec    aPTT 32.3 (H) 22.1 - 31.0 sec    aPTT, therapeutic range     58.0 - 77.0 SECS   CBC WITH AUTOMATED DIFF    Collection Time: 03/16/23  6:06 PM   Result Value Ref Range    WBC 3.3 (L) 3.6 - 11.0 K/uL    RBC 2.62 (L) 3.80 - 5.20 M/uL    HGB 7.2 (L) 11.5 - 16.0 g/dL    HCT 23.7 (L) 35.0 - 47.0 %    MCV 90.5 80.0 - 99.0 FL    MCH 27.5 26.0 - 34.0 PG    MCHC 30.4 30.0 - 36.5 g/dL    RDW 17.2 (H) 11.5 - 14.5 %    PLATELET 746 (L) 485 - 400 K/uL    MPV 11.7 8.9 - 12.9 FL    NRBC 29.1 (H) 0  WBC    ABSOLUTE NRBC 0.96 (H) 0.00 - 0.01 K/uL    NEUTROPHILS 76 (H) 32 - 75 %    LYMPHOCYTES 10 (L) 12 - 49 %    MONOCYTES 12 5 - 13 %    EOSINOPHILS 0 0 - 7 %    BASOPHILS 0 0 - 1 %    METAMYELOCYTES 2 (H) 0 %    IMMATURE GRANULOCYTES 0 %    ABS. NEUTROPHILS 2.5 1.8 - 8.0 K/UL    ABS. LYMPHOCYTES 0.3 (L) 0.8 - 3.5 K/UL    ABS. MONOCYTES 0.4 0.0 - 1.0 K/UL    ABS. EOSINOPHILS 0.0 0.0 - 0.4 K/UL    ABS. BASOPHILS 0.0 0.0 - 0.1 K/UL    ABS. IMM.  GRANS. 0.0 K/UL    DF MANUAL      RBC COMMENTS ANISOCYTOSIS  1+        RBC COMMENTS TERRY CELLS  1+       LACTIC ACID    Collection Time: 03/16/23  6:08 PM   Result Value Ref Range    Lactic acid 4.4 (HH) 0.4 - 2.0 MMOL/L   GLUCOSE, POC    Collection Time: 03/16/23 10:36 PM   Result Value Ref Range    Glucose (POC) 177 (H) 65 - 117 mg/dL    Performed by Marizol Sanchez RN    RENAL FUNCTION PANEL    Collection Time: 03/17/23 12:51 AM   Result Value Ref Range    Sodium 146 (H) 136 - 145 mmol/L    Potassium 4.5 3.5 - 5.1 mmol/L    Chloride 121 (H) 97 - 108 mmol/L    CO2 12 (LL) 21 - 32 mmol/L    Anion gap 13 5 - 15 mmol/L    Glucose 226 (H) 65 - 100 mg/dL     (H) 6 - 20 MG/DL    Creatinine 3.43 (H) 0.55 - 1.02 MG/DL    BUN/Creatinine ratio 30 (H) 12 - 20      eGFR 13 (L) >60 ml/min/1.73m2    Calcium 8.7 8.5 - 10.1 MG/DL    Phosphorus 5.7 (H) 2.6 - 4.7 MG/DL    Albumin 2.3 (L) 3.5 - 5.0 g/dL   PROTEIN/CREATININE RATIO, URINE    Collection Time: 03/17/23  3:00 AM   Result Value Ref Range    Protein, urine random 192 (H) 0.0 - 11.9 mg/dL    Creatinine, urine random 74.60 mg/dL    Protein/Creat. urine Ratio 2.6     SODIUM, UR, RANDOM    Collection Time: 03/17/23  3:00 AM   Result Value Ref Range    Sodium,urine random 13 MMOL/L   CHLORIDE, URINE RANDOM    Collection Time: 03/17/23  3:00 AM   Result Value Ref Range    Chloride,urine random 24 MMOL/L   METABOLIC PANEL, COMPREHENSIVE    Collection Time: 03/17/23  3:00 AM   Result Value Ref Range    Sodium 143 136 - 145 mmol/L    Potassium 4.5 3.5 - 5.1 mmol/L    Chloride 120 (H) 97 - 108 mmol/L    CO2 11 (LL) 21 - 32 mmol/L    Anion gap 12 5 - 15 mmol/L    Glucose 244 (H) 65 - 100 mg/dL     (H) 6 - 20 MG/DL    Creatinine 3.50 (H) 0.55 - 1.02 MG/DL    BUN/Creatinine ratio 31 (H) 12 - 20      eGFR 13 (L) >60 ml/min/1.73m2    Calcium 8.8 8.5 - 10.1 MG/DL    Bilirubin, total 2.7 (H) 0.2 - 1.0 MG/DL    ALT (SGPT) 16 12 - 78 U/L    AST (SGOT) 24 15 - 37 U/L    Alk.  phosphatase 173 (H) 45 - 117 U/L    Protein, total 6.6 6.4 - 8.2 g/dL    Albumin 2.2 (L) 3.5 - 5.0 g/dL    Globulin 4.4 (H) 2.0 - 4.0 g/dL    A-G Ratio 0.5 (L) 1.1 - 2.2     MAGNESIUM    Collection Time: 03/17/23 3:00 AM   Result Value Ref Range    Magnesium 2.2 1.6 - 2.4 mg/dL   PHOSPHORUS    Collection Time: 03/17/23  3:00 AM   Result Value Ref Range    Phosphorus 5.8 (H) 2.6 - 4.7 MG/DL   CBC WITH AUTOMATED DIFF    Collection Time: 03/17/23  3:00 AM   Result Value Ref Range    WBC 10.9 3.6 - 11.0 K/uL    RBC 3.43 (L) 3.80 - 5.20 M/uL    HGB 9.4 (L) 11.5 - 16.0 g/dL    HCT 31.0 (L) 35.0 - 47.0 %    MCV 90.4 80.0 - 99.0 FL    MCH 27.4 26.0 - 34.0 PG    MCHC 30.3 30.0 - 36.5 g/dL    RDW 17.6 (H) 11.5 - 14.5 %    PLATELET 701 915 - 321 K/uL    MPV 11.8 8.9 - 12.9 FL    NRBC 17.7 (H) 0  WBC    ABSOLUTE NRBC 1.92 (H) 0.00 - 0.01 K/uL    NEUTROPHILS 90 (H) 32 - 75 %    BAND NEUTROPHILS 4 0 - 6 %    LYMPHOCYTES 1 (L) 12 - 49 %    MONOCYTES 5 5 - 13 %    EOSINOPHILS 0 0 - 7 %    BASOPHILS 0 0 - 1 %    IMMATURE GRANULOCYTES 0 %    ABS. NEUTROPHILS 10.3 (H) 1.8 - 8.0 K/UL    ABS. LYMPHOCYTES 0.1 (L) 0.8 - 3.5 K/UL    ABS. MONOCYTES 0.5 0.0 - 1.0 K/UL    ABS. EOSINOPHILS 0.0 0.0 - 0.4 K/UL    ABS. BASOPHILS 0.0 0.0 - 0.1 K/UL    ABS. IMM. GRANS. 0.0 K/UL    DF MANUAL      RBC COMMENTS ANISOCYTOSIS  1+        RBC COMMENTS TRERY CELLS  PRESENT        RBC COMMENTS OVALOCYTES  PRESENT        RBC COMMENTS POLYCHROMASIA  PRESENT       LACTIC ACID    Collection Time: 03/17/23  3:00 AM   Result Value Ref Range    Lactic acid 3.1 (HH) 0.4 - 2.0 MMOL/L   GLUCOSE, POC    Collection Time: 03/17/23  8:02 AM   Result Value Ref Range    Glucose (POC) 232 (H) 65 - 117 mg/dL    Performed by Debra Verma MD  [address]  Phone - [phone]   Fax - [phone]  [email or web address]

## 2023-03-17 NOTE — DISCHARGE SUMMARY
Discharge Summary     Patient: Dominique Dewitt       MRN: [id number]       YOB: 1950       Age: 74 y.o. Date of admission:  3/16/2023    Date of discharge:  3/17/2023    Primary care provider:  Mariana Villalobos MD     Admitting provider:  Zuleyka Nunez MD    Discharging provider(s): Denise Ramirez MD - Staff [address]     Consultations  IP CONSULT TO CARDIOLOGY  IP CONSULT TO NEPHROLOGY  IP CONSULT TO ADVANCED HEART FAILURE  IP CONSULT TO PALLIATIVE CARE - PROVIDER    Procedures  Intubated  Central line    Discharge Condition: Passed away. Discharge Destination: Passed away    Admission diagnosis  CHF (congestive heart failure) ([facility] [I50.9]  Cardiogenic shock  Acute decompensated heart failure  Acute hypoxic respiratory failure  Acute Respiratory Distress Syndrome  Acute on chronic renal failure    Please refer to the admission history and physical for details on the presenting problem. Final discharge diagnoses and brief hospital course    Ms Alma Xie was a 65year old female with a PMH of HFrEF 25% w pacemaker/ICD, SVT post ablation, DM2, CCT5t-4, dementia, pressure ulcers, and HTN presented from SNF on 3/15 with hypoxia, weight gain, worsening LE edema. She was found to have acute on chronic HF exacerbation c/b LES on CKD. She was transferred to [address] [place] [address] on 3/16 for further management. Repeat TTE showed EF 13%. Due to hypotension, tachycardic, lethargic and hypoxic, pt was transferred to ICU. Intubated and was started on multiple pressors. She remained in refractory shock despite multiple pressors.       Physical examination at discharge  Patient passed away     Pertinent imaging studies:  CXR  ---------------------------------    Chronic Diagnoses:    Problem List as of 3/17/2023 Date Reviewed: 2/1/2023            Codes Class Noted - Resolved    Anasarca ICD-10-CM: R60.1  ICD-9-CM: 782.3  3/15/2023 - Present        CHF (congestive heart failure) ([facility] ICD-10-CM: I50.9  ICD-9-CM: 428.0  3/15/2023 - Present        Diabetes mellitus type 2, controlled ([facility] ICD-10-CM: E11.9  ICD-9-CM: 250.00  3/15/2023 - Present        Hypothyroid ICD-10-CM: E03.9  ICD-9-CM: 244.9  3/15/2023 - Present        Pressure ulcers of skin of multiple topographic sites ICD-10-CM: L89.90  ICD-9-CM: 707.00, 707.20  3/15/2023 - Present        HTN (hypertension) ICD-10-CM: I10  ICD-9-CM: 401.9  3/15/2023 - Present        Diabetes ([facility] ICD-10-CM: E11.9  ICD-9-CM: 250.00  3/15/2023 - Present        Metabolic acidosis MPX-09-PS: E87.20  ICD-9-CM: 276.2  3/15/2023 - Present        Anemia ICD-10-CM: D64.9  ICD-9-CM: 285.9  3/15/2023 - Present        LES (acute kidney injury) ([facility] ICD-10-CM: N17.9  ICD-9-CM: 584.9  3/15/2023 - Present        Lower extremity edema ICD-10-CM: R60.0  ICD-9-CM: 782.3  12/23/2022 - Present        Fluid overload ICD-10-CM: E87.70  ICD-9-CM: 276.69  12/23/2022 - Present        Ambulatory dysfunction ICD-10-CM: R26.2  ICD-9-CM: 719.7  12/23/2022 - Present        [facility] exacerbation [facility] ICD-10-CM: I50.9  ICD-9-CM: 428.0  12/18/2022 - Present        RESOLVED: Dementia ([facility] ICD-10-CM: F03.90  ICD-9-CM: 294.20  12/23/2022 - 12/23/2022           Time spent on discharge greater than 30 minutes.       Signed:      Gerold Dancer, MD   Staff [facility]    3/17/2023   7:55 PM      Cc: Josefa Yusuf MD

## 2023-03-17 NOTE — PROGRESS NOTES
Spiritual Care Assessment/Progress Note  [facility]      NAME: Emeterio Alfaro      MRN: [id number]  AGE: 74 y.o. SEX: female  Baptist Affiliation: Yazidism   Language: English     3/17/2023     Total Time (in minutes): 1     Spiritual Assessment begun in [facility] [place] [facility] CV INTNSV CARE through conversation with:         []Patient        [] Family    [] Friend(s)        Reason for Consult: Family care     Spiritual beliefs: (Please include comment if needed)     [] Identifies with a erica tradition:         [] Supported by a erica community:            [] Claims no spiritual orientation:           [] Seeking spiritual identity:                [] Adheres to an individual form of spirituality:           [x] Not able to assess:                           Identified resources for coping:      [] Prayer                               [] Music                  [] Guided Imagery     [] Family/friends                 [] Pet visits     [] Devotional reading                         [] Unknown     [] Other:                                               Interventions offered during this visit: (See comments for more details)          Family/Friend(s):  Affirmation of emotions/emotional suffering, Catharsis/review of pertinent events in supportive environment, Prayer (assurance of)     Plan of Care:     [] Support spiritual and/or cultural needs    [] Support AMD and/or advance care planning process      [] Support grieving process   [] Coordinate Rites and/or Rituals    [] Coordination with community clergy   [] No spiritual needs identified at this time   [] Detailed Plan of Care below (See Comments)  [] Make referral to Music Therapy  [] Make referral to Pet Therapy     [] Make referral to Addiction services  [] Make referral to [facility]  [] Make referral to Spiritual Care Partner  [] No future visits requested        [] Contact Spiritual Care for further referrals     Comments:    Referral source:   Emeterio Alfaro at [facility]. [address] in [facility] [place] [facility] CV INTNS [facility]. I reviewed the medical record prior to this encounter. Spiritual Assessment:   Two of Mr Jaren Grey family members arrived; they appeared pensive. Provided spiritual presence and active listening as patient's daughter shared that the family was doing okay. Offered words of support and assurance of prayers on behalf of patient and family. Outcome: Family expressed appreciation for pastoral support and the opportunity to share their thoughts and feelings. Plan of Care: Family is aware of  availability and was encouraged to request support as needed. The  on-call can be reached at [phone]). Rev.  Arturo Silva MS, [facility]  Staff

## 2023-03-17 NOTE — H&P
[address]  HISTORY AND PHYSICAL    Name:  Pat Gómez  MR#:  [id number]  :  1950  ACCOUNT #:  [de-identified]  ADMIT DATE:  2023    ADMIT ATTENDING:  Ariadne Antunez MD    CHIEF COMPLAINT:  \"CHF. \"    HISTORY OF PRESENT ILLNESS:  Please note that the whole history is as per the patient's record. Because of the patient's dementia as well as changes in mental status, the patient was not able to contribute anything significantly to her HPI ***. This is a 41-year-old female with a past medical history of chronic systolic CHF, with an ejection fraction of 25% in 2022; chronic kidney disease stage III; diabetes mellitus type 2, not on insulin; hypothyroidism; dementia with behavioral disturbances; hypertension; supraventricular tachycardia. She comes over here after being transferred from [facility] for CHF. It seems that the patient was admitted over there on 03/15/2023, and subsequently the patient was put on IV diuresis, but her renal function got worse and after discussion with the cardiologist, it was decided to transfer the patient for advanced level of care. Meanwhile, the patient was also having abdominal pain, and was noted to have a drop in hemoglobin, when the patient was transfused with a unit of blood. Also there have been some changes in mental status, reportedly because of risperidone, as per the discharging physician from the hospital at [facility]. As soon as the patient was brought into the hospital, the patient's blood pressure had dropped quite significantly to systolic blood pressure to 70 and heart rate had increased to 140 to 145. Pulse ox on 2 L of oxygen had been dropping to 70s. At that time, Rapid Response was called. REVIEW OF SYSTEMS:  Quite limited as the patient is not able to contribute anything significantly to her HPI as well as review of systems, because of her mental state.   As far as we know, the patient did not complain of any chest pain, unusual shortness of breath, headache, dizziness, nausea, vomiting, fever, cough. No changes in bowel movements. PAST MEDICAL HISTORY:  1. Chronic systolic CHF. 2.  Chronic kidney disease stage III. 3.  Diabetes mellitus type 2.  4.  Hypertension. 5.  Hypothyroidism. 6.  Nonischemic cardiomyopathy. 7.  Supraventricular tachycardia. SURGERIES:  ICD placement. FAMILY HISTORY:  Not significant for coronary artery disease. SOCIAL HISTORY:  Nonalcoholic, non-drug abuser. ALLERGIES:  REPORTEDLY ALLERGY TO CONTRAST AGENT, WHICH GIVES HER NAUSEA AND VOMITING. HOME MEDICATIONS:  The patient is on the following medications,  1. Allopurinol 200 mg p.o. daily. 2.  Amiodarone 100 mg p.o. daily. 3.  Captopril 25 mg p.o. daily. 4.  Trulicity 1.5 mg subcutaneous injection once a week. 5.  Lasix 40 mg p.o. b.i.d.  6.  Hydralazine 10 mg p.o. b.i.d.  7.  Tresiba. 8.  Sliding scale insulin. 9.  Synthroid 50 mcg p.o. q.a.m. 10.  Metoprolol XL 50 mg p.o. daily. 11.  Protonix 40 mg p.o. daily. 12.  Simvastatin 20 mg p.o. daily. 13.  Aldactone 25 mg p.o. daily. PHYSICAL EXAMINATION:  VITAL SIGNS:  At the time the patient was seen, the patient's vitals were as follows; blood pressure 77/45, pulse 145, respiratory rate of 35, saturating 75% on 2 L of nasal cannula. GENERAL:  Not in acute distress, drowsy, arousable with painful stimuli. HEENT:  Head:  Normocephalic, atraumatic. Eyes:  PERRLA. EOMI. Ears:  No growth. Nose:  No polyps. No bleeding. Mouth:  Dry mucous membranes. Decent oral hygiene. NECK:  Supple. No JVD. No thyromegaly. RESPIRATORY:  Bilateral diminished breath sounds. CARDIOVASCULAR:  Tachycardic. No murmurs heard. GASTROINTESTINAL:  Bowel sounds present. Soft, nontender, nondistended. On the right side of the abdomen, there are signs of hematoma. NEUROLOGICAL:  Cranial nerves are likely intact.   No focal motor deficit appreciated, although a good neurological and psychiatric examination was not able to be done because the patient was not cooperative. LABORATORY AND RADIOLOGICAL RESULTS:  WBC 3.3, hemoglobin 7.2, hematocrit 23.7, and a platelet count of 082. Sodium 145, potassium 3.9, chloride of 121, bicarb 14, BUN of 102, creatinine of 3.29. Lactic acid of 4.4. VBG:  PH of 7.09, pCO2 of 28.7, and a pO2 of 54. Echo done on the patient today showed an ejection fraction of 13% with severe global hypokinesis. ASSESSMENT AND PLAN:  This is a 77-year-old female with a past medical history of chronic systolic congestive heart failure, ejection fraction of 20% to 25%, status post implantable cardioverter defibrillator; hypertension; hypercholesterolemia; diabetes mellitus type 2, on insulin; chronic kidney disease stage III. 1.  She presented at a Hospital at [facility] with acute on chronic congestive heart failure, was put on intravenous diuresis, but because of worsening renal function, it was decided to transfer the patient to higher level of care. First, Rapid Response with shock, likely cardiogenic. Initially, the patient did respond to intravenous albumin. Basic blood work was done, including CBC, CMP, troponin, D-dimer, chest x-ray. I personally spoke to the cardiologist, greatly appreciate his recommendations. I did talk to the ICU attending as well who has kindly accepted the patient under his care. Meanwhile, we will put the patient on amiodarone drip after giving bolus. We will get the cultures as well, including lactate. The possibility of sepsis is low, but we would leave it up to the ICU attending to put the patient on intravenous antibiotic. 2.  Acute on chronic systolic congestive heart failure, status post Implantable cardioverter defibrillator, with an ejection fraction of 20% to 25%. The patient had an echo done today which showed an ejection fraction of 13% with severe global hypokinesis.   The patient would likely need pressors and we will wait for Cardiology's further recommendations. The patient would also need strict inputs and outputs with daily weights. I have personally spoken to the nephrologist as well to evaluate for need for emergent RRT. 3.  Acute on chronic anemia, unclear etiology, but probably secondary to right lower abdomen hematoma. The patient has already received 1 unit of blood. We will follow the repeat CBC today and if needed, then we would transfuse the patient with another unit of blood. 4.  Acute kidney injury over chronic kidney disease stage III/IV. We will do a basic workup, that needs to be followed. Also I spoke to the nephrologist personally and the nephrologist has kindly agreed on seeing the patient as soon as possible. 5.  Diabetes mellitus type 2. We will put the patient on sliding scale insulin. 6.  Anemia. Follow workup. 7.  Thrombocytopenia. Follow workup. Would avoid anticoagulation for now. 8.  Metabolic acidosis, likely secondary to acute kidney injury. Management as per Nephrology. 9.  Hypothyroidism on Synthroid, continue. 10. The patient is full code. The patient is coming from a skilled nursing facility, but the patient would likely be discharged to skilled nursing facility whenever she is clinically stable. I did 47 minutes of critical care on the patient in coordinating her plan with talking with the consultant and talking to the patient's family members.       Jacki Bedoya MD      [id number]  D:  03/16/2023 19:24  T:  03/16/2023 22:40  JOB #:  [id number]

## 2023-03-17 NOTE — CONSULTS
Comprehensive Nutrition Assessment    Type and Reason for Visit:      Nutrition Recommendations/Plan:   ***       Malnutrition Assessment:  Malnutrition Status:       Context:        Findings of the 6 clinical characteristics of malnutrition:   {Malnutrition Characteristics:25813}       Nutrition Assessment:    Pt admitted with CHF (congestive heart failure) ([facility]) [I50.9]  PMHx of HFrEF 25% w pacemaker/ICD, SVT post ablation, DM2 [id number], dementia, pressure ulcers, and HTN who presented from her SNF at [facility] to an OSH on 3/15 with hypoxia, weight gain, worsening LE edema. She was found to have acute on chronic HF exacerbation c/b LES on CKD. She was transferred to [facility] on 3/16 for further management. Repeat TTE showed EF 13%. Upon transfer to [facility], she was hypotensive, tachycardic, lethargic and hypoxic. VBG did not reveal hypercapnia. She was transferred to the ICU where she required intubation and initiation of vasopressors. Noted CT of abdomen on *** with extensive anasarca. LES on CKD3b/4 - d/t ATN post cardiac arrest.  Cr rising. If continuing aggressive care, will need CRRT. Ve Observed 8.89 l/min    Temp (24hrs), Av.5 °F (36.9 °C), Min:97.7 °F (36.5 °C), Max:99.3 °F (37.4 °C)        RD consulted for tube feeding management. Given high pressors, ***      Nutritionally Significant Medications:  Buminate, protonix, Na bicarb @ 75 mL/hr, lasix  Drips: amiodarone, precedex, epi (off), fentanyl (off), SSI, levo @ 30, graciela to start, vaso @ 0.4          Estimated Daily Nutrient Needs:        Energy (kcal/day):       Protein (g/day):       Fluid (ml/day):         Nutrition Related Findings:   Edema: No data recorded    Last BM: 23, Soft    Wounds:    ***    Current Nutrition Therapies:  Diet: NPO  Nutrition Support: none at this time        Anthropometric Measures:  Height:    Ideal Body Weight (IBW):   ( )     Current Body Wt:   ,   IBW.     Current BMI (kg/m2): Last 3 Recorded Weights in this Encounter    03/17/23 0600   Weight: 106.2 kg (234 lb 2.1 oz)     Wt Readings from Last 15 Encounters:   03/17/23 106.2 kg (234 lb 2.1 oz)   03/16/23 105.6 kg (232 lb 11.2 oz)   12/23/22 97 kg (213 lb 13.5 oz)   12/17/22 105.1 kg (231 lb 12.8 oz)   11/18/22 81.6 kg (180 lb)   08/06/21 86.2 kg (190 lb)           Nutrition Diagnosis:   {PES Statements:09487}    Nutrition Interventions:                 Goals:             Nutrition Monitoring and Evaluation:              Discharge Planning:         Recent Labs     03/17/23  0300 03/17/23 0051 03/16/23 1806 03/16/23 1656 03/16/23  0521 03/15/23  2206   * 226* 130* 119* 152* 172*   * 102* 102* 107* 100* 100*   CREA 3.50* 3.43* 3.29* 3.47* 3.30* 3.15*    146* 145 146* 145 146*   K 4.5 4.5 3.9 4.3 4.4 4.3   * 121* 121* 120* 113* 112*   CO2 11* 12* 14* 14* 17* 17*   CA 8.8 8.7 8.3* 9.0 9.0 9.1   PHOS 5.8* 5.7*  --   --   --   --    MG 2.2  --   --  2.2 2.0 2.0       Recent Labs     03/17/23  0300 03/17/23 0051 03/16/23 1806 03/16/23  1656 03/15/23  0902   ALT 16  --  12 13 15   AST 24  --  7* 11* 14*   *  --  91 104 134*   TBILI 2.7*  --  1.3* 1.3* 0.9   TP 6.6  --  5.7* 6.5 7.1   ALB 2.2* 2.3* 2.0* 2.0* 2.4*   GLOB 4.4*  --  3.7 4.5* 4.7*   LPSE  --   --   --   --  40*       Recent Labs     03/17/23  0300 03/16/23  1808 03/16/23  1656   LAC 3.1* 4.4* 2.9*       Recent Labs     03/17/23  0300 03/16/23  1806   WBC 10.9 3.3*   HGB 9.4* 7.2*   HCT 31.0* 23.7*    131*       No results found for: PREALB    No results found for: TRIGL    Recent Labs     03/17/23  0802 03/16/23  2236 03/16/23  1154 03/16/23  0747 03/15/23  2135 03/15/23  1600   GLUCPOC 232* 177* 135* 148* 182* 164*       Lab Results   Component Value Date/Time    Hemoglobin A1c 5.7 (H) 12/18/2022 06:27 PM    Hemoglobin A1c 5.6 04/19/2021 02:50 PM       Iron Profile    Iron   Date Value Ref Range Status   03/15/2023 14 (L) 35 - 150 ug/dL Final     TIBC   Date Value Ref Range Status   03/15/2023 228 (L) 250 - 450 ug/dL Final     Iron % saturation   Date Value Ref Range Status   03/15/2023 6 (L) 20 - 50 % Final       No results found for: FERR    B Vitamins  No results found for: FOL, B12LT, VB6LT, VIB1LT    Zinc  No results found for: ZINCLT    Copper  No results found for: COSLT    Selenium  No results found for: SELNLT    Vit C  No results found for: [id number]    No results found for: VITALT, VITD3, VITE1T, VITEG, VIK1LT    No results found for: NH4      Amanda [place], RD  Available via [employer]

## 2023-03-17 NOTE — DEATH NOTE
Pt Name  Ti Rausch date:  3/16/2023   Date and time of death:  3/17/2023 @ 19:23 pm   Room Number  3541/07    Medical Record Number  [id number] @ . [facility] [address] [facility]   Age  74 y.o. Date of Birth 1950   PCP Mariana Villalobos MD   Attending physician Yancy Spencer MD      Code Status  DNR    Patient seen and examined     Mental status   Unresponsive    Pupils Dilated and Fixed Yes   Respiration No respiratory effort   Pulse  Absent     Heart Sounds  Absent    Rhythm  Flat line   Family  Notified by Nursing staff and attending   Chaplan Service  Notified by Nursing staff     Death certificate and discharge summary completion remain  Dr. Yancy Spencer MD's responsibility.                                  3/17/2023     Denise Ramirez MD    Critical Care Medicine  [facility]

## 2023-03-17 NOTE — PROGRESS NOTES
2000 - Bedside and Verbal shift change report given to Carmen Tejeda RN (oncoming nurse) by Taty Denis RN (offgoing nurse).  Report included the following information SBAR, Kardex, ED Summary, Intake/Output, MAR, Recent Results, and Cardiac Rhythm ST .

## 2023-03-17 NOTE — DEATH NOTE
Death Pronouncement Note    Patient's Name: Durga Christina   Patient's YOB: 1950  MRN Number: [id number]    Admitting Provider: Russel Garcia MD  Attending Provider: Veronica Bateman MD     Patient was examined and the following were absent: Pulses, Blood Pressure, and Respiratory effort    I declared the patient dead on  at 19:23 pm.    Preliminary Cause of Death:   -Cardiogenic shock  -Acute decompensated heart failure  -Acute on chronic renal failure  -Acute hypoxic respiratory failure  -Acute Respiratory distress syndrome    Electronically signed by Lady Alicea MD on 3/17/2023 at 7:41 PM

## 2023-03-17 NOTE — CONSULTS
Palliative Medicine Consult  Jesse: [phone] ([phone])    Patient Name: Durga Christina  YOB: 1950    Date of Initial Consult:   Date of Today's Visit: 3/17/2023  Reason for Consult: Goals of care  Requesting Provider: Dr. Jess Fountain  Primary Care Physician: Guadalupe Peña MD     SUMMARY:   Durga Christina is a 74 y. o. with a past history of CHF, EF 25% s/p pacemaker/ICD, SVT s/p ablation, DM 2, CKD 3B-4, dementia, pressure ulcers, HTN, who initially presented from [address] [place] [address] and rehab to an OSH on 3/15, with CC of hypoxia, weight gain and worsening lower extremity edema. She was found to have acute on chronic CHF exacerbation and LES. She was transferred to [facility] for further management and admitted on 3/16/2023 with diagnosis of acute metabolic encephalopathy, acute hypoxic respiratory failure, LES, acute on chronic CHF exacerbation and cardiogenic shock. Upon arrival to [facility] patient hypotensive, tachycardic, lethargic and hypoxic, she was emergently transferred to the ICU and intubated. Unfortunately shortly after being intubated Ms. Dearl Baumgarten experienced cardiac arrest, CPR initiated until ROSC achieved echo with EF 13%, severe global hypokinesis. Moderate to severe TR, pulmonary hypertension    Current medical issues leading to Palliative Medicine involvement include: Goals of care s/p PEA, EF 13%, moderate to severe TR, encephalopathy, worsening renal failure. Social: , however  has vascular dementia and lives in a facility, 3 daughters and 1 son, multiple grandchildren     PALLIATIVE DIAGNOSES:   Palliative care encounter  Concerned about end-of-life  End-stage CHF  Acute renal failure  Advance care planning discussion  Goals of care discussion       PLAN:   Prior to visit completed chart review and discussed with patient's nurse Jenny Ward RN, who stated that family is considering comfort care and would like to talk with Palliative.   Patient seen, intubated and unresponsive, her 3 daughters Reyna Watson and Lianne were at bedside, along with 2 grandchildren. All 3 daughters with very good understanding of Ms. Geronimo Poor multiple medical issues and poor prognosis. Daughters also notified me that they have a brother, they have been keeping him updated and that he is currently driving from [facility] to the hospital and should arriving within the next hour two. [address] discussion -patient's 3 daughters and their brother, have made decision to move forward with compassionate extubation, but only after their brother arrives this evening. Palliative will not be here this evening, so I  have ordered, signed and held all comfort related orders needed. The nurse this evening should be able to  release these orders when family is ready to move forward with extubation. .  Patient's nurse may need to get verbal orders to discontinue all none comfort related meds/treatments/interventions, including:  diabetes meds, cardiac/pressors,  Antibiotics and d/c labs, AHF consult, BGs, cardiac monitoring and other non comfort related nursing interventions. Order placed for case management to consult hospice tomorrow for possible GIP if needed. Advance care planning discussion-Mrs. Roberta Pedersen does not have an AMD.  Patient is , however family reports that Mr. Granados Smoker has vascular dementia, resides in a facility and does not have decision-making capacity. Therefore, her 4 children together can legally make her medical decision together, definitive decisions do require majority consensus, so at least 3 out of 4 of her children will need to be in agreement.     Palliative will follow-up Monday if patient still here    initial consult note routed to primary continuity provider and/or primary health care team members  Communicated plan of care with: Arun Victor Team Dr. Yeison Worrell, unit nurse Josue Benitez RN     GOALS OF CARE / TREATMENT PREFERENCES:     GOALS OF CARE:  Patient/Health Care Proxy Stated Goals: Comfort    TREATMENT PREFERENCES:   Code Status: DNR    Patient and family's personal goals include: Unknown    Important upcoming milestones or family events: Unknown    The patient identifies the following as important for living well: Unknown      Advance Care Planning:  [x] [facility] Interdisciplinary Team has updated the ACP Navigator with [address] and Patient Capacity      Primary Decision MakeMalcolm Holcomb - Daughter - [phone]    Primary Decision Maker: Davida Cowden - Daughter - [phone]    Primary Decision Maker: Saji Sarahcarlie - Daughter - [address] [facility] Planning 3/16/2023   Patient's [address] is: Legal Next of \[facility]\"" 296 Directive None   Patient Would Like to Complete Advance Directive No       Medical Interventions: Comfort measures       Other:    As far as possible, the palliative care team has discussed with patient / health care proxy about goals of care / treatment preferences for patient.      HISTORY:     History obtained from: Medical records/nurse/family    CHIEF COMPLAINT: Not applicable    HPI/SUBJECTIVE:    The patient is:   [] Verbal and participatory  [x] Non-participatory due to:   Patient sedated, intubated and unable to     Clinical Pain Assessment (nonverbal scale for severity on nonverbal patients):   Clinical Pain Assessment  Severity: 0  Location: unable to assess, patient unresponsive, non verbal  Character: unable to assess, patient unresponsive, non verbal  Duration: unable to assess, patient unresponsive, non verbal  Effect: unable to assess, patient unresponsive  Factors: unable to assess, patient unresponsive, non verbal  Frequency: unable to assess, patient unresponsive, non verbal     Activity (Movement): Lying quietly, normal position    Duration: for how long has pt been experiencing pain (e.g., 2 days, 1 month, years)  Frequency: how often pain is an issue (e.g., several times per day, once every few days, constant)     FUNCTIONAL ASSESSMENT:     Palliative Performance Scale (PPS):  PPS: 10       PSYCHOSOCIAL/SPIRITUAL SCREENING:     Palliative IDT has assessed this patient for cultural preferences / practices and a referral made as appropriate to needs (Cultural Services, Patient Advocacy, Ethics, etc.)    Any spiritual / Mosque concerns:  [] Yes /  [x] No   If \"Yes\" to discuss with pastoral care during IDT     Does caregiver feel burdened by caring for their loved one:   [] Yes /  [x] No /  [] No Caregiver Present/Available [] No Caregiver [] Pt Lives at Facility  If \"Yes\" to discuss with social work during IDT    Anticipatory grief assessment:   [x] Normal  / [] Maladaptive     If \"Maladaptive\" to discuss with social work during IDT    ESAS Anxiety:      ESAS Depression:          REVIEW OF SYSTEMS:     Positive and pertinent negative findings in ROS are noted above in HPI. The following systems were [] reviewed / [x] unable to be reviewed as noted in HPI  Other findings are noted below. Systems: constitutional, ears/nose/mouth/throat, respiratory, gastrointestinal, genitourinary, musculoskeletal, integumentary, neurologic, psychiatric, endocrine. Positive findings noted below. Modified ESAS Completed by: provider   Fatigue: 10 Drowsiness: 10     Pain: 0         Anorexia: 10 Dyspnea: 10           Stool Occurrence(s): 1        PHYSICAL EXAM:     From RN flowsheet:  Wt Readings from Last 3 Encounters:   03/17/23 234 lb 2.1 oz (106.2 kg)   03/16/23 232 lb 11.2 oz (105.6 kg)   12/23/22 213 lb 13.5 oz (97 kg)     Blood pressure (!) 66/39, pulse 89, temperature 99 °F (37.2 °C), resp. rate 23, height 5' 3\" (1.6 m), weight 234 lb 2.1 oz (106.2 kg), SpO2 98 %.     Pain Scale 1: Adult Nonverbal Pain Scale  Pain Intensity 1: 0                 Last bowel movement, if known:     Constitutional: Intubated, critically ill-appearing, NAD  Eyes: Did not force eyes open to examine  ENMT: no nasal discharge, dry mucous membranes, orally intubated and  Cardiovascular: regular rhythm, hypotension  Respiratory: breathing not labored, intubated on ventilator  Gastrointestinal: soft non-tender, +bowel sounds  Musculoskeletal: no deformity, no tenderness to palpation  Skin: warm,sedated, unresponsive to voice or touch, no movement extremities observed  Psychiatric: Unable to assess  Other:       HISTORY:     Active Problems:    CHF (congestive heart failure) ([facility] (3/15/2023)    Past Medical History:   Diagnosis Date    CHF (congestive heart failure) (HCC)     Chronic kidney disease     Diabetes ([facility]     Hypertension     Hypothyroidism     Menopause     Nonischemic cardiomyopathy ([facility]     Supraventricular tachycardia ([facility]     s/p ablation      Past Surgical History:   Procedure Laterality Date    HX  SECTION      HX HERNIA REPAIR      HX IMPLANTABLE CARDIOVERTER DEFIBRILLATOR        Family History   Problem Relation Age of Onset    Diabetes Mother     Diabetes Father       History reviewed, no pertinent family history.   Social History     Tobacco Use    Smoking status: Never    Smokeless tobacco: Never   Substance Use Topics    Alcohol use: Not on file     Allergies   Allergen Reactions    Contrast Agent [Iodine] Nausea and Vomiting      Current Facility-Administered Medications   Medication Dose Route Frequency    sodium bicarbonate (8.4%) 150 mEq in dextrose 5% 1,000 mL infusion   IntraVENous CONTINUOUS    PHENYLephrine (SUZETTE-SYNEPHRINE) 30 mg in 0.9% sodium chloride 250 mL infusion   mcg/min IntraVENous TITRATE    metoprolol (LOPRESSOR) injection 10 mg  10 mg IntraVENous ONCE    metoprolol (LOPRESSOR) 5 mg/5 mL injection        piperacillin-tazobactam (ZOSYN) 4.5 g in 0.9% sodium chloride (MBP/ADV) 100 mL MBP  4.5 g IntraVENous Q12H    sodium chloride (NS) flush 5-40 mL  5-40 mL IntraVENous Q8H    sodium chloride (NS) flush 5-40 mL  5-40 mL IntraVENous PRN    acetaminophen (TYLENOL) tablet 650 mg  650 mg Oral Q6H PRN    Or    acetaminophen (TYLENOL) suppository 650 mg  650 mg Rectal Q6H PRN    polyethylene glycol (MIRALAX) packet 17 g  17 g Oral DAILY PRN    ondansetron (ZOFRAN ODT) tablet 4 mg  4 mg Oral Q8H PRN    Or    ondansetron (ZOFRAN) injection 4 mg  4 mg IntraVENous Q6H PRN    glucose chewable tablet 16 g  4 Tablet Oral PRN    glucagon (GLUCAGEN) injection 1 mg  1 mg IntraMUSCular PRN    atorvastatin (LIPITOR) tablet 10 mg  10 mg Oral QHS    levothyroxine (SYNTHROID) tablet 50 mcg  50 mcg Oral ACB    pantoprazole (PROTONIX) 40 mg in 0.9% sodium chloride 10 mL injection  40 mg IntraVENous Q12H    honey (MEDIHONEY) topical paste   Topical DAILY    0.9% sodium chloride infusion 250 mL  250 mL IntraVENous PRN    0.9% sodium chloride infusion 250 mL  250 mL IntraVENous PRN    amiodarone (CORDARONE) 375 mg/250 mL D5W infusion  0.5-1 mg/min IntraVENous TITRATE    fentaNYL (PF) 1,500 mcg/30 mL (50 mcg/mL) infusion  0-200 mcg/hr IntraVENous TITRATE    EPINEPHrine (ADRENALIN) 5 mg in 0.9% sodium chloride 250 mL infusion  0-10 mcg/min IntraVENous TITRATE    vasopressin (VASOSTRICT) 20 Units in 0.9% sodium chloride 100 mL infusion  0-0.1 Units/min IntraVENous TITRATE    NOREPINephrine (LEVOPHED) 8 mg in 5% dextrose 250mL (32 mcg/mL) infusion  0.5-30 mcg/min IntraVENous TITRATE    chlorhexidine (ORAL CARE KIT) 0.12 % mouthwash 15 mL  15 mL Oral Q12H    dexmedeTOMidine in 0.9 % NaCl (PRECEDEX) 400 mcg/100 mL (4 mcg/mL) infusion soln  0.1-1.5 mcg/kg/hr IntraVENous TITRATE    0.9% sodium chloride infusion  8 mL/hr IntraVENous CONTINUOUS          LAB AND IMAGING FINDINGS:     Lab Results   Component Value Date/Time    WBC 10.9 03/17/2023 03:00 AM    HGB 9.4 (L) 03/17/2023 03:00 AM    PLATELET 644 69/53/4858 03:00 AM     Lab Results   Component Value Date/Time    Sodium 143 03/17/2023 11:27 AM    Potassium 5.0 03/17/2023 11:27 AM    Chloride 119 (H) 03/17/2023 11:27 AM    CO2 12 (LL) 03/17/2023 11:27 AM     (H) 03/17/2023 11:27 AM    Creatinine 3.58 (H) 03/17/2023 11:27 AM    Calcium 7.9 (L) 03/17/2023 11:27 AM    Magnesium 2.2 03/17/2023 03:00 AM    Phosphorus 5.8 (H) 03/17/2023 03:00 AM      Lab Results   Component Value Date/Time    Alk. phosphatase 173 (H) 03/17/2023 03:00 AM    Protein, total 6.6 03/17/2023 03:00 AM    Albumin 2.2 (L) 03/17/2023 03:00 AM    Globulin 4.4 (H) 03/17/2023 03:00 AM     Lab Results   Component Value Date/Time    INR 1.6 (H) 03/16/2023 06:06 PM    Prothrombin time 16.6 (H) 03/16/2023 06:06 PM    aPTT 32.3 (H) 03/16/2023 06:06 PM    aPTT 21.6 12/22/2022 11:36 PM      Lab Results   Component Value Date/Time    Iron 14 (L) 03/15/2023 11:26 AM    TIBC 228 (L) 03/15/2023 11:26 AM    Iron % saturation 6 (L) 03/15/2023 11:26 AM      No results found for: PH, PCO2, PO2  No components found for: [place]   Lab Results   Component Value Date/Time    CK 20 (L) 03/16/2023 05:21 AM                Total time: 75 minutes     Note: this can only be billed with  (initial) or 21 [phone] (follow up). If multiple start / stop times, list each separately.

## 2023-03-17 NOTE — PROGRESS NOTES
Plans for withdrawal of care later tonight when the patient's son arrives [place]. Please call us back if anything changes. Shree Holland MD  [address]  Phone - [phone]   Fax - [phone]  [email or web address]

## 2023-03-17 NOTE — PROGRESS NOTES
Spiritual Care Assessment/Progress Note  [facility]      NAME: Olegario Silva      MRN: [id number]  AGE: 74 y.o. SEX: female  Muslim Affiliation: Adventist   Language: English     3/17/2023     Total Time (in minutes): 10     Spiritual Assessment begun in [facility] through conversation with:         []Patient        [] Family    [] Friend(s)        Reason for Consult: Follow-up, critical     Spiritual beliefs: (Please include comment if needed)     [] Identifies with a erica tradition:         [] Supported by a erica community:            [] Claims no spiritual orientation:           [] Seeking spiritual identity:                [] Adheres to an individual form of spirituality:           [x] Not able to assess:                           Identified resources for coping:      [] Prayer                               [] Music                  [] Guided Imagery     [] Family/friends                 [] Pet visits     [] Devotional reading                         [] Unknown     [] Other:                                               Interventions offered during this visit: (See comments for more details)          Family/Friend(s):  Affirmation of emotions/emotional suffering, Prayer (assurance of), Initial Assessment, Normalization of emotional/spiritual concerns     Plan of Care:     [] Support spiritual and/or cultural needs    [] Support AMD and/or advance care planning process      [] Support grieving process   [] Coordinate Rites and/or Rituals    [] Coordination with community clergy   [] No spiritual needs identified at this time   [] Detailed Plan of Care below (See Comments)  [] Make referral to Music Therapy  [] Make referral to Pet Therapy     [] Make referral to Addiction services  [] Make referral to [facility]  [] Make referral to Spiritual Care Partner  [] No future visits requested        [] Contact Spiritual Care for further referrals     Comments: Visited Ms Delilah De Guzman in [place] for spiritual assessment; reviewed patient's chart prior to visit. Ms Tery Osgood was lying quietly in bed with eyes closed and on vent support; no family was present. Unable to do assessment at that time. Consulted with patient's nurse regarding patient and family status and POC. : . Radha Vicente.  Pushpa Murray; [facility], to contact [phone] [address]twon Krishnan call: [phone]

## 2023-03-17 NOTE — PROGRESS NOTES
Clinical Pharmacy Note - Extended Infusion Piperacillin/Tazobactam Dosing    This patient has been ordered piperacillin/tazobactam at 3.375 g IV every 6 hours. P&T protocol allows automatic substitution to extended infusion piperacillin/tazobactam and dose adjustment based on indication and renal function (adjustment required if creatinine clearance < 20 mL/min). Indication: aspiration PNA    CrCl: 16 mL/min    Per P&T protocol, the piperacillin/tazobactam dosing will be adjusted to 4.5 g IV every 12 hours (each dose will be infused over 4 hours) for BMI > 40 and crcl < 20 ml/min. Please call pharmacy with any questions.

## 2023-03-17 NOTE — DISCHARGE INSTRUCTIONS
Download the Heart Failure Canones Annie: Search in your [employer] (Android) or [employer] Annie Store ([employer]): Search for- HF Canones Annie.    [email or web address]    HF Canones is a brand-new phone annie that helps you track daily symptoms, vitals, mood, energy level and more. You can even add your heart failure care team members to view your data and monitor your condition at home.     HF Canones lets you:  Track symptoms, medications and more  Share health information with your health care team  Connect with others living with heart failure

## 2023-03-17 NOTE — CONSULTS
Consult received for tube feeding management. Noted pressor requirements which precludes safe initiation of feeds at this time. Spoke with RN and she reports that currently the plan is no escalation of care and leaning towards comfort. Palliative consulted. Will complete consult for now and follow along peripherally until goals have been formally established. Thank you.     Sofie Kelley RD  Available via [employer]

## 2023-03-17 NOTE — NURSE NAVIGATOR
HEART FAILURE NURSE NAVIGATOR NOTE  [address]    Patient chart was reviewed by Heart Failure (HF) Nurse Navigators for compliance of prescribed treatment with guidelines directed medical therapy (GDMT) and HF database completed. Please, review beneath recommendations for symptomatic patients with HF with Reduced Ejection Fraction ? 40% (HFrEF)* and patients whose LVEF improved > 40% (HFimpEF)* for your consideration when taking care of this patient. Current Medical Therapy:    Name Ericka Marie    1950   LVEF 13 %   NYHA Functional Class Documentation needed   ARNi/ACEi/ARB Documentation needed   Beta-blocker Toprol XL   Aldosterone Antagonist Contraindicated GFR <30   SGLT2 inhibitor Contraindicated GFR<30   Hydralazine/Isosorbide Dinitrate    Consulting Cardiologist: IVETH     Recommendations:    Please, add the following GDMT for HFrEF ? 40% [Class 1] or document in discharge summary/progress note why patient cannot take the medication:  ARNi/ACEi or ARB  Beta-blockers (carvedilol, sustained-release metoprolol succinate or bisoprolol)  Aldosterone antagonists GFR > 30 and K< 5  SGLT2 inhibitor  Hydralazine/Isosorbide dinitrate for  Americans with Class III/IV symptoms  Adjust diuretic dose at discharge if hospitalized for volume overload    Consider adding the following GDMT for HFrEF ? 40%, if appropriate [Class 2b]:   Ivabradine for patients on maximally tolerated beta-blocker dose in order to achieve HR 70-80bpm  Digoxin, goal level 0.5-0.9  Polyunsaturated fatty acids  Vericuguat  For patient with hyperkalemia while on RAASi > 5.5, consider adding potassium binders (patiromer, sodium zirconium cycosilicate)    Note: the following medications may be potentially harmful in heart failure [Class 3]:  Calcium channel blockers (doxazosin, diltiazem, verapamil, nifedipine)  Antiarrhythmics (flecanide, disopyrimide, sotalol, dronedarone)  Diabetes medications (thiasolidinediones, saxagliptin, alogliptin)  NSAIDs and MERAZ 2 inhibitors    Consider vaccinations for respiratory illnesses (flu, pneumonia, covid) [Class 2b]    For eligible patients with LVEF < 35% consider discharge with wearable defibrillation [Class 2b] and/or referral for ICD implantation [Class 1] for prevention of sudden cardiac death. Due to severely reduced LVEF < 25% and/or recurrent hospitalizations this patient may benefit from referral to Advanced Heart Failure Program to assess suitability for advanced therapies, such as left-ventricular assist device, heart transplantation, palliative inotropes or palliation [Class 1]. To obtain in-patient consultation or refer to [address] [place] [address], call [phone]    Patient Education:     Teach back in heart failure education provided, including information about medical therapy, lifestyle modifications, diet and fluid restrictions, physical activity. Educational resources provided: Living with Heart Failure booklet; Signs/Symptoms magnet; Weight Calendar; [facility] flyer; Preparation for Successful Discharge Checklist.  Information provided about HF support group. Heart failure avoiding triggers on discharge instructions. Plan for Transitional Care:    Post discharge follow up phone call to be made within 48-72 hours of discharge. Patient will follow-up with PCP. Patient will follow-up with Primary Cardiologist.  Obstructive sleep apnea screening done and patient was referred to Sleep Medicine. Referral/follow-up with Cardiac Rehabilitation. Referral/follow-up with Advanced Heart Failure Specialist.  Referral/follow-up with Palliative Care Specialist.      Heart Failure Nurse Navigator  Phone: [phone]  /  [phone]    *Recommendations listed above are based on 2022 AHA/ACC/HFSA Guideline for the Management of Heart Failure:  A Report of the [address] [place] [address] Committee on Clinical Practice Guidelines. Circulation 2022; [id number]. and 2017 AHA/ACC/HRS guideline for management of patients with ventricular arrhythmias and the prevention of sudden cardiac death: A Report of the [employer] of Cardiology/American Heart Association Task Force on Clinical Practice Guidelines and the Heart Rhythm Society.  Heart Rhythm, Vol 15, No 10, October 2018 *Class of Recommendation: Class 1 (strong), Class 2a (moderate), Class 2b (weak), Class 3 (not recommended, potentially harmful)

## 2023-03-17 NOTE — PROGRESS NOTES
0745: Bedside and Verbal shift change report received from [address] [place] [address] to [facility]. Report included the following information SBAR, Kardex, Intake/Output, MAR, Recent Results, Med Rec Status, Cardiac Rhythm Ventricular paced, and Alarm Parameters . Appropriateness and documentation of restraints has been reviewed with the off-going RN. Restraint order is current and valid. 0750: Pt in V-tach 130s, sustaining. BP tolerating SBP 140s. Taya Foster MD notified. 3727: Pt self converted, now V paced 100-110s    0758: Pupils pinpoint, no cough/gag present. Corneal reflex present. No movement to BUE/BLE with noxious stimuli. Sedation stopped. 0800: Taya Foster MD at bedside, orders received to initiate sodium bicarb gtt, SBP 150s, epi gtt stopped and vasopressin weaned per verbal order. If BP drops - plan to escalate current vasopressors. Plan to speak with family regarding CRRT.     0830: Jaime MENDOZA at bedside, plan for Lasix dose this AM.     1000: MAPs 57-58, Levophed increased. 1015: MAPs remain 57-58, vasopressin gtt increased. 1030: Spoke with Taya Foster MD regarding vasopressors, plan to restart Epi gtt. 1045: Pt with increased ectopy following restarting Epi gtt, currently in a fib RVR MAPs 54-55-. Taya Foster MD notified. Orders received to stop epi gtt, and initiate Phenylephrine gtt. 1050: Phenylephrine gtt initiated. 1100: Continued ectopy - spoke with Taya Foster MD orders received for Metoprolol IVP    1105: Pt self converted V paced. 1115: MAP 49, spoke with Taya Foster MD, SBP goal 90 and above. 1138: AB.13/36.6/88/12.1, iCa 1.15 Lashon Waller MD notified. Orders received for 2 amps bicarb, 2g Ca Gluconate. 1200: 2 amps bicarb given. 2 g Ca Gluconate infusing. 1222: Pupils remain pinpoint, no cough/gag present. Corneal reflex present. No movement to BUE/BLE with noxious stimuli. 1330: Taya Foster MD at bedside - no escalation of care at this time.     1448: MAP's steadily dropping (39-40) - family updated and brought to bedside. 1500: Palliative care physician at bedside, plan to transition to comfort care once son arrives. 1530: [facility] notified- organ donation deferred per coordinator Celia Keys. 1923: MD at bedside to pronounce. 1936: Paco notified of time of death.

## 2023-03-17 NOTE — CONSULTS
Dr. [facility]. [phone]            Cardiology Consult/Progress Note      Requesting/referring provider: Fang De Guzman MD, Dr. Jc Rachel    Reason for Consult: Shock    Assessment/Plan:  1. Multifactorial/mixed pattern shock  2. Cardiomyopathy with biventricular dysfunction status post ICD  3. History of significant tricuspid regurgitation probably from RV dysfunction and prior device lead  4. Significant anasarca/systemic congestion possibly related to severe tricuspid regurgitation and underlying renal disease  5. Acute kidney injury on chronic kidney disease stage IIIb  6. Diabetes mellitus  7. Hypertension  8. Hypothyroidism  9. Anemia  10. Possible atrial arrhythmia  11. Ventilatory failure secondary to altered mental sensorium now status post intubation and mechanical ventilation    Ms. Sierra Murray is seen acutely in a critical condition after being transferred from [facility].  She appears to be multifactorial shock with evidence of systemic hypoperfusion. Her mental status changes are likely a combination of hypoperfusion with metabolic abnormalities along with contribution from drug administration. Unfortunately due to multiple comorbidities she is not a poor candidate for any kind of intervention. She does have severe RV dysfunction and LV dysfunction on the bedside echo performed by me. Unfortunately does not appear to be a good candidate for any kind of mechanical circulatory support at this time since there is no likely bailout in this scenario. Family considering no escalation of care. She has history of hypothyroidism. While the last TSH was 6.14 because of poor response to pressors, may consider bolus dose of 125 mg of Solu-Medrol in addition to continued resuscitation. Amiodarone appears reasonable.   Review of telemetry demonstrates high PVC burden, intermittent pacing and possible underlying A-fib/flutter with conduction block. Unfortunately not a candidate for any intervention at this time. Discussed with Dr. Kaila Cai, ICU attending. Requires correction of acidosis, likely central line, arterial line for blood pressure assessment. Overall prognosis appears to be extremely poor        Investigations ordered    [x]    High complexity decision making was performed  [x]    Patient is at high-risk of decompensation with multiple organ involvement  []    Complex/difficult social determinants of health outcomes  Total of ** minutes were spent on reviewing the records, analyzing investigations and documentation in the chart, on the day of visit including time for examination and time spent with the patient  Investigations personally reviewed and interpreted  Bedside echocardiogram performed by me demonstrate LVEF of about 20%. RV appears to be dilated with significant right atrial dilatation. RV function appears to be severely reduced. Investigations reviewed    HPI: Bessy Harris, a 74y.o. year-old who is seen for evaluation of management of cardiogenic shock. .  She was transferred from [facility] after being admitted for multifactorial anasarca, acute kidney injury, anemia, underlying history of nonischemic cardiomyopathy. She  has a past medical history of CHF (congestive heart failure) ([address] Chronic kidney disease, Diabetes ([address] Hypertension, Hypothyroidism, Menopause, Nonischemic cardiomyopathy ([address] and Supraventricular tachycardia ([address]   Review of system: Not available since patient is intubated       Family History   Problem Relation Age of Onset    Diabetes Mother     Diabetes Father       Social History     Socioeconomic History    Marital status:    Tobacco Use    Smoking status: Never    Smokeless tobacco: Never      PE  Vitals:    03/16/23 1756 03/16/23 1800 03/16/23 2000 03/16/23 2003   BP:  (!) 76/50 (!) 154/69    Pulse:  100 (!) 105 100   Resp: 25 20 17 17   SpO2:  100% 100% 100%    There is no height or weight on file to calculate BMI. General:  Intubated. Psychiatric:    Intubated   Eye/ENT:      Pupils equal, No asymmetry, full central reflexes   Lungs:      Visibly symmetric chest expansion, No palpable tenderness. Clear to auscultation bilaterally. Heart[de-identified]  Tachycardic no murmur, click, rub or gallop.significant central venous vein distention. Feeble pulses. Abdomen:     Soft, non-tender. Bowel sounds normal. No masses,  No      organomegaly. Extremities:   Extremities normal, atraumatic, profound edema up to thigh   Neurologic: Cannot examine.            Recent Labs:  No results found for: CHOL, CHOLX, CHLST, CHOLV, 509113, HDL, HDLP, LDL, LDLC, DLDLP, TGLX, TRIGL, TRIGP, CHHD, CHHDX  Lab Results   Component Value Date/Time    Creatinine 3.29 (H) 03/16/2023 06:06 PM     Lab Results   Component Value Date/Time     (H) 03/16/2023 06:06 PM     Lab Results   Component Value Date/Time    Potassium 3.9 03/16/2023 06:06 PM     Lab Results   Component Value Date/Time    Hemoglobin A1c 5.7 (H) 12/18/2022 06:27 PM     Lab Results   Component Value Date/Time    HGB 7.2 (L) 03/16/2023 06:06 PM     Lab Results   Component Value Date/Time    PLATELET 277 (L) 45/37/1416 06:06 PM       Reviewed:  Past Medical History:   Diagnosis Date    CHF (congestive heart failure) ([address]     Chronic kidney disease     Diabetes ([address]     Hypertension     Hypothyroidism     Menopause     Nonischemic cardiomyopathy ([address]     Supraventricular tachycardia ([address]     s/p ablation     Social History     Tobacco Use   Smoking Status Never   Smokeless Tobacco Never     Social History     Substance and Sexual Activity   Alcohol Use None     Allergies   Allergen Reactions    Contrast Agent [Iodine] Nausea and Vomiting     Family History   Problem Relation Age of Onset    Diabetes Mother     Diabetes Father         Current Facility-Administered Medications   Medication Dose Route Frequency    albumin human 25% (BUMINATE) solution 12.5 g  12.5 g IntraVENous Q6H    sodium chloride (NS) flush 5-40 mL  5-40 mL IntraVENous Q8H    sodium chloride (NS) flush 5-40 mL  5-40 mL IntraVENous PRN    acetaminophen (TYLENOL) tablet 650 mg  650 mg Oral Q6H PRN    Or    acetaminophen (TYLENOL) suppository 650 mg  650 mg Rectal Q6H PRN    polyethylene glycol (MIRALAX) packet 17 g  17 g Oral DAILY PRN    ondansetron (ZOFRAN ODT) tablet 4 mg  4 mg Oral Q8H PRN    Or    ondansetron (ZOFRAN) injection 4 mg  4 mg IntraVENous Q6H PRN    furosemide (LASIX) injection 40 mg  40 mg IntraVENous BID    [Held by provider] metoprolol succinate (TOPROL-XL) XL tablet 50 mg  50 mg Oral DAILY    glucose chewable tablet 16 g  4 Tablet Oral PRN    glucagon (GLUCAGEN) injection 1 mg  1 mg IntraMUSCular PRN    insulin lispro (HUMALOG) injection   SubCUTAneous AC&HS    atorvastatin (LIPITOR) tablet 10 mg  10 mg Oral QHS    [START ON 3/17/2023] levothyroxine (SYNTHROID) tablet 50 mcg  50 mcg Oral ACB    pantoprazole (PROTONIX) 40 mg in 0.9% sodium chloride 10 mL injection  40 mg IntraVENous Q12H    [START ON 3/17/2023] honey (MEDIHONEY) topical paste   Topical DAILY    0.9% sodium chloride infusion 250 mL  250 mL IntraVENous PRN    0.9% sodium chloride infusion 250 mL  250 mL IntraVENous PRN    amiodarone (CORDARONE) 375 mg/250 mL D5W infusion  0.5-1 mg/min IntraVENous TITRATE    sodium chloride 0.9 % bolus infusion 250 mL  250 mL IntraVENous ONCE    etomidate (AMIDATE) 2 mg/mL injection 10 mg  10 mg IntraVENous ONCE    rocuronium injection 50 mg  50 mg IntraVENous NOW    fentaNYL (PF) 1,500 mcg/30 mL (50 mcg/mL) infusion  0-200 mcg/hr IntraVENous TITRATE    etomidate (AMIDATE) 2 mg/mL injection        PHENYLephrine (NEOSYNEPHRINE) 0.4 mg/10 mL (40 mcg/mL) in NS syringe        rocuronium 10 mg/mL injection        PHENYLephrine (NEOSYNEPHRINE) 0.4 mg/10 mL (40 mcg/mL) in NS syringe        EPINEPHrine (ADRENALIN) 5 mg in 0.9% sodium chloride 250 mL infusion  0-10 mcg/min IntraVENous TITRATE    vasopressin (VASOSTRICT) 20 Units in 0.9% sodium chloride 100 mL infusion  0-0.1 Units/min IntraVENous TITRATE    NOREPINephrine (LEVOPHED) 8 mg in 5% dextrose 250mL (32 mcg/mL) infusion  0.5-30 mcg/min IntraVENous TITRATE    chlorhexidine (ORAL CARE KIT) 0.12 % mouthwash 15 mL  15 mL Oral Q12H    sodium bicarbonate 8.4 % (1 mEq/mL) injection 100 mEq  100 mEq IntraVENous ONCE    dexmedeTOMidine in 0.9 % NaCl (PRECEDEX) 400 mcg/100 mL (4 mcg/mL) infusion soln  0.1-1.5 mcg/kg/hr IntraVENous TITRATE    PHENYLephrine (NEOSYNEPHRINE) in NS syringe 400 mcg  400 mcg IntraVENous ONCE    PHENYLephrine (NEOSYNEPHRINE) in NS syringe 100-400 mcg  100-400 mcg IntraVENous ONCE    0.9% sodium chloride infusion  8 mL/hr IntraVENous CONTINUOUS       I personally spent 40 minutes of critical care time. This is time spent at this critically ill patient's bedside / unit / floor actively involved in patient care as well as the coordination of care. This does not include any procedural time which has been billed separately. I had a face to face encounter with the patient, reviewed and interpreted patient data including clinical events, labs, images, vital signs, I/O's, and examined patient. I have discussed the case and the plan and management of the patient's care with the consulting services and care team.  This patient has a high probability of imminent and/or clinically significant deterioration. ATTENTION:   This medical record was transcribed using an electronic medical records/speech recognition system. Although proofread, it may and can contain electronic, spelling and other errors. Corrections may be executed at a later time. Please feel free to contact us for any clarifications as needed.     [facility] [place]  [facility], [place], [place]. [phone]

## 2023-03-18 NOTE — PROGRESS NOTES
Problem: Falls - Risk of  Goal: *Absence of Falls  Description: Document Eugenie Carrera Fall Risk and appropriate interventions in the flowsheet. Outcome: Resolved/Not Met     Problem: Patient Education: Go to Patient Education Activity  Goal: Patient/Family Education  Outcome: Resolved/Not Met     Problem: Ventilator Management  Goal: *Adequate oxygenation and ventilation  Outcome: Resolved/Not Met  Goal: *Patient maintains clear airway/free of aspiration  Outcome: Resolved/Not Met  Goal: *Absence of infection signs and symptoms  Outcome: Resolved/Not Met  Goal: *Normal spontaneous ventilation  Outcome: Resolved/Not Met     Problem: Patient Education: Go to Patient Education Activity  Goal: Patient/Family Education  Outcome: Resolved/Not Met     Problem: Non-Violent Restraints  Goal: Removal from restraints as soon as assessed to be safe  Outcome: Resolved/Not Met  Goal: No harm/injury to patient while restraints in use  Outcome: Resolved/Not Met  Goal: Patient's dignity will be maintained  Outcome: Resolved/Not Met  Goal: Patient Interventions  Outcome: Resolved/ Not Met     Problem: Pressure Injury - Risk of  Goal: *Prevention of pressure injury  Description: Document Isidro Scale and appropriate interventions in the flowsheet.   Outcome: Resolved/ Not Met     Problem: Patient Education: Go to Patient Education Activity  Goal: Patient/Family Education  Outcome: Resolved/ Not Met

## 2023-03-18 NOTE — PROGRESS NOTES
Responded to notification of patient's death in CVICU. Met several of her family members in the waiting room, including her three daughters and one son. She lived with daughter Teresa Lincoln. She is survived by her  who also lives with Teresa Lincoln. Teresa Lincoln reports that he suffers from dementia and she is one of his primary caregivers. Offered support and listened as they spoke of her life and relationship with others. She as well as many other family members are persons of erica which gives them some peace and hope in the face of her un welcomed death.    Chaplain Mariaelena, MDiv, MS, [facility]

## 2023-03-19 LAB
BACTERIA SPEC CULT: ABNORMAL
BACTERIA SPEC CULT: ABNORMAL
SERVICE CMNT-IMP: ABNORMAL

## 2023-03-21 NOTE — PROGRESS NOTES
Physician Progress Note      Luis Carlos Adkins  CSN #:                  [id number]  :                       1950  ADMIT DATE:       3/15/2023 8:45 AM  DISCH DATE:        3/16/2023 2:41 PM  RESPONDING  PROVIDER #:        Mika Araya MD          QUERY TEXT:    Pt admitted with Acute on Chronic CHF. Pt noted to also have PMH of HTN and Nonischemic cardiomyopathy. If possible, please document the etiology of CHF, if able to be determined. The medical record reflects the following:  Risk Factors: 75 y/o F, CHF, HTN, NICMP, CKD, DM    Clinical Indicators:    Cardiology Consult:  * NICM: EF 25-30% Troponin leak, not ACS range. EKG nonischemic. BNP is elevated. Generalized edema. Lasix held due to worsening kidney function. Recommend daily weights, strict I&Os, and 1500 ml fluid restrictions. Continue telemetry monitoring. Keep serum potassium between 4-5 and serum magnesium > 2.  * Anasarca: Multifactorial, may be due to cardiomyopathy EF 25-30% and severe kidney diease. ?  Treatment: BNP, serial troponins, cardiology consult, echo, IV Lasix, Daily weights, strict I&Os, fluid restriction    Thank you,  Kirit Peterson BSN, RN, CRCR  Please contact me for any questions or concerns regarding this query at [email or web address]  Options provided:  -- CHF due to Hypertensive Heart Disease  -- CHF due to Hypertensive Heart Disease and NICMP  -- CHF not due to Hypertension but due to NICMP  -- Other - I will add my own diagnosis  -- Disagree - Not applicable / Not valid  -- Disagree - Clinically unable to determine / Unknown  -- Refer to Clinical Documentation Reviewer    PROVIDER RESPONSE TEXT:    Provider is clinically unable to determine a response to this query.   patient transferred to [facility] and this may able to answer above question    Query created by: Wilda Lesches on 3/20/2023 8:48 AM      Electronically signed by:  Mika Araya MD 3/21/2023 9:34 AM

## 2024-02-13 NOTE — PROGRESS NOTES
Quality 130: Documentation Of Current Medications In The Medical Record: Current Medications Documented Was paged that patient with a bloody BM.  -STAT CBC, PT, PTT, Type and Screen  -Lovenox DCed Detail Level: Detailed Quality 431: Preventive Care And Screening: Unhealthy Alcohol Use - Screening: Patient not identified as an unhealthy alcohol user when screened for unhealthy alcohol use using a systematic screening method Quality 47: Advance Care Plan: Advance care planning not documented, reason not otherwise specified. Quality 110: Preventive Care And Screening: Influenza Immunization: Influenza Immunization Administered during Influenza season Quality 111:Pneumonia Vaccination Status For Older Adults: Patient received any pneumococcal conjugate or polysaccharide vaccine on or after their 60th birthday and before the end of the measurement period Quality 226: Preventive Care And Screening: Tobacco Use: Screening And Cessation Intervention: Patient screened for tobacco use and is an ex/non-smoker